# Patient Record
Sex: MALE | Race: BLACK OR AFRICAN AMERICAN | NOT HISPANIC OR LATINO | Employment: OTHER | URBAN - METROPOLITAN AREA
[De-identification: names, ages, dates, MRNs, and addresses within clinical notes are randomized per-mention and may not be internally consistent; named-entity substitution may affect disease eponyms.]

---

## 2017-01-31 RX ORDER — TAMSULOSIN HYDROCHLORIDE 0.4 MG/1
CAPSULE ORAL
Qty: 30 CAPSULE | Refills: 0 | Status: SHIPPED | OUTPATIENT
Start: 2017-01-31 | End: 2018-04-09

## 2018-04-09 ENCOUNTER — HOSPITAL ENCOUNTER (EMERGENCY)
Facility: HOSPITAL | Age: 68
Discharge: HOME | End: 2018-04-09
Payer: MEDICARE

## 2018-04-09 VITALS
HEART RATE: 90 BPM | RESPIRATION RATE: 17 BRPM | DIASTOLIC BLOOD PRESSURE: 82 MMHG | SYSTOLIC BLOOD PRESSURE: 160 MMHG | TEMPERATURE: 98 F | BODY MASS INDEX: 26.54 KG/M2 | OXYGEN SATURATION: 98 % | WEIGHT: 185 LBS

## 2018-04-09 DIAGNOSIS — R73.9 HYPERGLYCEMIA: ICD-10-CM

## 2018-04-09 DIAGNOSIS — N40.0 PROSTATE ENLARGEMENT: ICD-10-CM

## 2018-04-09 DIAGNOSIS — R33.9 URINARY RETENTION: Primary | ICD-10-CM

## 2018-04-09 LAB
ALBUMIN SERPL BCP-MCNC: 4 G/DL
ALP SERPL-CCNC: 89 U/L
ALT SERPL W/O P-5'-P-CCNC: 22 U/L
ANION GAP SERPL CALC-SCNC: 10 MMOL/L
AST SERPL-CCNC: 13 U/L
BACTERIA #/AREA URNS AUTO: ABNORMAL /HPF
BASOPHILS # BLD AUTO: 0.02 K/UL
BASOPHILS NFR BLD: 0.2 %
BILIRUB SERPL-MCNC: 1.1 MG/DL
BILIRUB UR QL STRIP: NEGATIVE
BUN SERPL-MCNC: 13 MG/DL
CALCIUM SERPL-MCNC: 9.7 MG/DL
CHLORIDE SERPL-SCNC: 99 MMOL/L
CLARITY UR REFRACT.AUTO: CLEAR
CO2 SERPL-SCNC: 28 MMOL/L
COLOR UR AUTO: YELLOW
CREAT SERPL-MCNC: 0.92 MG/DL
DIFFERENTIAL METHOD: ABNORMAL
EOSINOPHIL # BLD AUTO: 0.2 K/UL
EOSINOPHIL NFR BLD: 2.4 %
ERYTHROCYTE [DISTWIDTH] IN BLOOD BY AUTOMATED COUNT: 11.7 %
EST. GFR  (AFRICAN AMERICAN): >60 ML/MIN/1.73 M^2
EST. GFR  (NON AFRICAN AMERICAN): >60 ML/MIN/1.73 M^2
GLUCOSE SERPL-MCNC: 235 MG/DL
GLUCOSE SERPL-MCNC: 235 MG/DL (ref 70–110)
GLUCOSE UR QL STRIP: ABNORMAL
HCT VFR BLD AUTO: 42.4 %
HGB BLD-MCNC: 14.8 G/DL
HGB UR QL STRIP: ABNORMAL
HYALINE CASTS UR QL AUTO: 0 /LPF
KETONES UR QL STRIP: NEGATIVE
LEUKOCYTE ESTERASE UR QL STRIP: NEGATIVE
LYMPHOCYTES # BLD AUTO: 1.6 K/UL
LYMPHOCYTES NFR BLD: 18.3 %
MCH RBC QN AUTO: 31.2 PG
MCHC RBC AUTO-ENTMCNC: 34.9 G/DL
MCV RBC AUTO: 90 FL
MICROSCOPIC COMMENT: ABNORMAL
MONOCYTES # BLD AUTO: 0.7 K/UL
MONOCYTES NFR BLD: 8.6 %
NEUTROPHILS # BLD AUTO: 6 K/UL
NEUTROPHILS NFR BLD: 70.3 %
NITRITE UR QL STRIP: NEGATIVE
PH UR STRIP: 7 [PH] (ref 5–8)
PLATELET # BLD AUTO: 235 K/UL
PMV BLD AUTO: 9.8 FL
POCT GLUCOSE: 235 MG/DL (ref 70–110)
POTASSIUM SERPL-SCNC: 4.5 MMOL/L
PROT SERPL-MCNC: 7 G/DL
PROT UR QL STRIP: ABNORMAL
RBC # BLD AUTO: 4.74 M/UL
RBC #/AREA URNS AUTO: 12 /HPF (ref 0–4)
SODIUM SERPL-SCNC: 137 MMOL/L
SP GR UR STRIP: 1.01 (ref 1–1.03)
URN SPEC COLLECT METH UR: ABNORMAL
UROBILINOGEN UR STRIP-ACNC: NEGATIVE EU/DL
WBC # BLD AUTO: 8.59 K/UL
WBC #/AREA URNS AUTO: 3 /HPF (ref 0–5)
YEAST UR QL AUTO: ABNORMAL

## 2018-04-09 PROCEDURE — 99284 EMERGENCY DEPT VISIT MOD MDM: CPT | Mod: 25

## 2018-04-09 PROCEDURE — 51702 INSERT TEMP BLADDER CATH: CPT

## 2018-04-09 PROCEDURE — 82962 GLUCOSE BLOOD TEST: CPT

## 2018-04-09 PROCEDURE — 85025 COMPLETE CBC W/AUTO DIFF WBC: CPT

## 2018-04-09 PROCEDURE — 80053 COMPREHEN METABOLIC PANEL: CPT

## 2018-04-09 PROCEDURE — 81000 URINALYSIS NONAUTO W/SCOPE: CPT

## 2018-04-09 RX ORDER — TAMSULOSIN HYDROCHLORIDE 0.4 MG/1
0.4 CAPSULE ORAL DAILY
Qty: 10 CAPSULE | Refills: 0 | Status: SHIPPED | OUTPATIENT
Start: 2018-04-09 | End: 2018-04-17 | Stop reason: SDUPTHER

## 2018-04-09 NOTE — ED PROVIDER NOTES
Encounter Date: 4/9/2018       History     Chief Complaint   Patient presents with    Dysuria     states he is unable to urinate since this morning. states this happened a 2 years ago     68-year-old male presents to emergency room complaining of inability to urinate morning.  Patient states he had similar issue approximately 2 years ago and was diagnosed with a large prostate.  Has not followed up since that time.  States he has not had any further complications since that time.  Reports abdominal pain and discomfort at this time.          Review of patient's allergies indicates:  No Known Allergies  Past Medical History:   Diagnosis Date    Urinary tract infection      Past Surgical History:   Procedure Laterality Date    ORTHOPEDIC SURGERY       Family History   Problem Relation Age of Onset    Prostate cancer Neg Hx     Kidney disease Neg Hx      Social History   Substance Use Topics    Smoking status: Former Smoker    Smokeless tobacco: Never Used    Alcohol use Yes     Review of Systems   Constitutional: Negative for fever.   HENT: Negative for sore throat.    Respiratory: Negative for shortness of breath.    Cardiovascular: Negative for chest pain.   Gastrointestinal: Negative for nausea.   Genitourinary: Positive for difficulty urinating. Negative for dysuria.   Musculoskeletal: Negative for back pain.   Skin: Negative for rash.   Neurological: Negative for weakness.   Hematological: Does not bruise/bleed easily.   All other systems reviewed and are negative.      Physical Exam     Initial Vitals [04/09/18 1223]   BP Pulse Resp Temp SpO2   (!) 169/95 105 18 97.8 °F (36.6 °C) 98 %      MAP       119.67         Physical Exam    Nursing note and vitals reviewed.  Constitutional: He appears well-developed and well-nourished. He is not diaphoretic. No distress.   HENT:   Head: Normocephalic and atraumatic.   Eyes: Conjunctivae are normal.   Neck: Normal range of motion. Neck supple.   Cardiovascular:  Normal rate and regular rhythm.   Pulmonary/Chest: Breath sounds normal. No respiratory distress.   Abdominal: Soft. He exhibits no distension. There is tenderness in the suprapubic area.   Genitourinary: Prostate is enlarged. Prostate is not tender.   Musculoskeletal: Normal range of motion. He exhibits no tenderness.   Neurological: He is alert and oriented to person, place, and time.   Skin: Skin is warm and dry. Capillary refill takes less than 2 seconds.   Psychiatric: He has a normal mood and affect. His behavior is normal. Judgment and thought content normal.         ED Course   Procedures  Labs Reviewed   URINALYSIS - Abnormal; Notable for the following:        Result Value    Protein, UA 1+ (*)     Glucose, UA 4+ (*)     Occult Blood UA 1+ (*)     All other components within normal limits   URINALYSIS MICROSCOPIC - Abnormal; Notable for the following:     RBC, UA 12 (*)     All other components within normal limits   CBC W/ AUTO DIFFERENTIAL - Abnormal; Notable for the following:     MCH 31.2 (*)     All other components within normal limits   COMPREHENSIVE METABOLIC PANEL - Abnormal; Notable for the following:     Glucose 235 (*)     Total Bilirubin 1.1 (*)     AST 13 (*)     All other components within normal limits   POCT GLUCOSE MONITORING CONTINUOUS - Abnormal; Notable for the following:     POC Glucose 235 (*)     All other components within normal limits   POCT GLUCOSE - Abnormal; Notable for the following:     POCT Glucose 235 (*)     All other components within normal limits             Medical Decision Making:   Initial Assessment:   68-year-old male presents to emergency room complaining of inability to urinate morning.  Patient states he had similar issue approximately 2 years ago and was diagnosed with a large prostate.  Has not followed up since that time.  States he has not had any further complications since that time.  Reports abdominal pain and discomfort at this time.  Bladder scan  performed in over a liter of urine noted in the bladder.  Patient has suprapubic tenderness on exam.  Approximately 1100 cc of urine drained from bladder.  Patient reports relief.  Prostate exam reveals a large prostate nontender.  Exam otherwise unremarkable  Differential Diagnosis:   Enlarged prostate, prostate cancer, ureteritis, UTI  ED Management:  Patient will be discharged with urinary catheter in place.  Instructed to follow-up with urology as soon as possible.  Follow-up primary care doctor in the next 2-3 days.  Educated on catheter care and when to return to the emergency room.  Patient verbalized understanding.    Urine has +4 glucose in urine.  CBG checked in the emergency room and was over 200.  Labs otherwise unremarkable.  Patient instructed to follow primary care doctor related to elevated blood glucose.  If any symptoms worsen return to emergency room.                      Clinical Impression:   The primary encounter diagnosis was Urinary retention. Diagnoses of Prostate enlargement and Hyperglycemia were also pertinent to this visit.                           Keren Hopper NP  04/09/18 [id number]

## 2018-04-09 NOTE — DISCHARGE INSTRUCTIONS
Follow with primary care doctor related to elevated blood glucose.  Follow with Dr. Warner related to urinary retention.  Return to emergency room if any symptoms worsen.

## 2018-04-17 ENCOUNTER — OFFICE VISIT (OUTPATIENT)
Dept: UROLOGY | Facility: CLINIC | Age: 68
End: 2018-04-17
Payer: MEDICARE

## 2018-04-17 VITALS
SYSTOLIC BLOOD PRESSURE: 178 MMHG | DIASTOLIC BLOOD PRESSURE: 88 MMHG | TEMPERATURE: 98 F | BODY MASS INDEX: 25.2 KG/M2 | HEIGHT: 70 IN | WEIGHT: 176 LBS | HEART RATE: 85 BPM

## 2018-04-17 DIAGNOSIS — N40.1 BPH WITH URINARY OBSTRUCTION: ICD-10-CM

## 2018-04-17 DIAGNOSIS — R81 GLUCOSE FOUND IN URINE ON EXAMINATION: ICD-10-CM

## 2018-04-17 DIAGNOSIS — Z46.6 ENCOUNTER FOR FOLEY CATHETER REMOVAL: ICD-10-CM

## 2018-04-17 DIAGNOSIS — R33.9 URINARY RETENTION: Primary | ICD-10-CM

## 2018-04-17 DIAGNOSIS — R73.09 ELEVATED GLUCOSE LEVEL: ICD-10-CM

## 2018-04-17 DIAGNOSIS — N13.8 BPH WITH URINARY OBSTRUCTION: ICD-10-CM

## 2018-04-17 PROCEDURE — 99214 OFFICE O/P EST MOD 30 MIN: CPT | Mod: 25,S$GLB,, | Performed by: NURSE PRACTITIONER

## 2018-04-17 PROCEDURE — 99999 PR PBB SHADOW E&M-EST. PATIENT-LVL III: CPT | Mod: PBBFAC,,, | Performed by: NURSE PRACTITIONER

## 2018-04-17 PROCEDURE — 51700 IRRIGATION OF BLADDER: CPT | Mod: S$GLB,,, | Performed by: NURSE PRACTITIONER

## 2018-04-17 RX ORDER — TAMSULOSIN HYDROCHLORIDE 0.4 MG/1
0.4 CAPSULE ORAL DAILY
Qty: 90 CAPSULE | Refills: 3 | Status: SHIPPED | OUTPATIENT
Start: 2018-04-17 | End: 2018-07-24 | Stop reason: SDUPTHER

## 2018-04-17 NOTE — PATIENT INSTRUCTIONS
BPH (Enlarged Prostate)  The prostate is a gland at the base of the bladder. As some men get older, the prostate may get bigger in size. This problem is called benign prostatic hyperplasia (BPH). BPH puts pressure on the urethra. This is the tube that carries urine from the bladder to the penis. It may interfere with the flow of urine. It may also keep the bladder from emptying fully.    Symptoms of BPH include trouble starting urination and feeling as though the bladder isnt emptying all the way. It also includes a weak urine stream, dribbling and leaking of urine, and frequent and urgent urination (especially at night). BPH can increase the risk of urinary infections. It can also block off urine flow completely. If this occurs, a thin tube (catheter) may be passed into the bladder to help drain urine.  If symptoms are mild, no treatment may be needed right now. If symptoms are more severe, treatment is likely needed. The goal of treatment is to improve urine flow and reduce symptoms. Treatments can include medicine and procedures. Your healthcare provider will discuss treatment options with you as needed.  Home care  The following guidelines will help you care for yourself at home:  · Urinate as soon as you feel the urge. Don't try to hold your urine.  · Don't limit your fluid intake during the day. Drink 6 to 8 glasses of water or liquids a day. This prevents bacteria from building up in the bladder.  · Avoid drinking fluids after dinner to help reduce urination during the night.  · Avoid medicines that can worsen your symptoms. These include certain cold and allergy medicines and antidepressants. Diuretics used for high blood pressure can also worsen symptoms. Talk to your doctor about the medicines you take. Other choices may work better for you.  Prostate cancer screening  BPH does not increase the risk of prostate cancer. But because prostate cancer is a common cancer in men, screening is sometimes  recommended. This may help detect the cancer in its early stages when treatment is most effective. Factors that can increase the risk of prostate cancer include being -American or having a father or brother who had prostate cancer. A high-fat diet may also increase the risk of prostate cancer. Talk to your healthcare provider to see whether you should be screened for prostate cancer.  Follow-up care  Follow up with your healthcare provider, or as advised  To learn more, go to:  · National Kidney & Urologic Diseases Information Clearinghouse  kidney.niddk.nih.gov, 407.934.8586  When to seek medical advice  Call your healthcare provider right away if any of these occur:  · Fever of 100.4°F (38.0°C) or higher, or as advised  · Unable to pass urine for 8 hours  · Increasing pressure or pain in your bladder (lower abdomen)  · Blood in the urine  · Increasing low back pain, not related to injury  · Symptoms of urinary infection (increased urge to urinate, burning when passing urine, foul-smelling urine)  Date Last Reviewed: 7/1/2016  © 7813-1727 [employer]. [address] [place] [address]. All rights reserved. This information is not intended as a substitute for professional medical care. Always follow your healthcare professional's instructions.        Prostate Problems and Related Urinary Symptoms    Many men have problems with the prostate at some time in their lives. The prostate gland is part of the male reproductive system. Its located just below the bladder. The prostate surrounds the urethra (the tube that carries urine and semen out of the body). The main function of the prostate gland is to add fluid to the semen. When problems occur in the prostate, the bladder and urethra are often affected as well. The most common prostate problems are described below.  BPH  BPH (benign prostatic hyperplasia) develops when changing hormone levels cause the prostate to grow larger. This often  starts around age 50. Excess tissue can block the urethra, making it harder for urine to flow. The enlarged prostate can also press on the bladder, so you may need to urinate more often. Other symptoms include straining during urination, a weak urine stream, urinating more at night, incontinence, dribbling at the end of urination, and feeling that the bladder isnt emptying all the way. Note that BPH is not cancer and does not cause cancer.  How BPH affects the bladder  Pushing to urinate through a narrowed urethra can cause the bladder walls to thicken or stretch out of shape. A stretched bladder may have problems emptying all the way. If urine stays in the bladder longer than it should, you may develop an infection or bladder stones. Also, the kidneys cant drain properly into a bladder that doesnt empty completely. This can lead to kidney failure. Pressure from urine buildup can also cause leaking of urine (called overflow incontinence).  Other prostate problems  · Prostatitis is an infection or inflammation that causes the prostate to become painful and swollen. The swelling narrows the urethra and can block the bladder neck. Prostatitis can cause a burning sensation during urination. You may also feel pressure or pain in the genital area. In some cases, prostatitis can cause fever and chills, and can make you very sick.  · Cancer occurs when abnormal cells form a tumor (a lump of cells that grow uncontrolled). Some tumors can be felt during a physical exam, others cant. Prostate cancer often causes no symptoms at all, especially in its early stages. Prostate symptoms are more likely to be caused by a problem that is NOT cancer.   Date Last Reviewed: 1/1/2017 © 2000-2017 [employer]. [address] [place] [address], [place] [address]. All rights reserved. This information is not intended as a substitute for professional medical care. Always follow your healthcare professional's  instructions.        Urinary Retention (Male)  Urinary retention is the medical term for difficulty or inability to pass urine, even though your bladder is full.  Causes  The most common cause of urinary retention in men is the bladder outlet being blocked. This can be due to an enlarged prostate gland or a bladder infection. Certain medicines can also cause this problem. This condition is more likely to occur as men get older.    This condition is treated by insertion of a catheter into the bladder to drain the urine. This provides immediate relief. The catheter may need to remain in place for a few days to prevent a recurrence. The catheter has a balloon on the tip which was inflated after insertion. This prevents the catheter from falling out.  Symptoms  Common symptoms of urinary retention include:  · Pain (not experienced by everyone)  · Frequent urination  · Feeling that the bladder is still full after urinating  · Incontinence (not being able to control the release of urine)  · Swollen abdomen  Treatment  This condition is treated by inserting a tube (catheter) into the bladder to drain the urine. This provides immediate relief. The catheter may need to stay in place for a few days. The catheter has a balloon on the tip, which is inflated after insertion. This prevents the catheter from falling out.  Home care  · If you were given antibiotics, take them until they are used up, or your healthcare provider tells you to stop. It is important to finish the antibiotics even though you feel better. This is to make sure your infection has cleared.  · If a catheter was left in place, it is important to keep bacteria from getting into the collection bag. Do not disconnect the catheter from the collection bag.  · Use a leg band to secure the drainage tube, so it does not pull on the catheter. Drain the collection bag when it becomes full using the drain spout at the bottom of the bag.  · Do not pull on or try to remove  your catheter. This will injure your urethra. The catheter must be removed by a healthcare provider.  Follow-up care  Follow up with your healthcare provider, or as advised.  If a catheter was left in place, it can usually be removed within 3 to 7 days. Some conditions require the catheter to stay in longer. Your healthcare provider will tell you when to return to have the catheter removed.  When to seek medical advice  Call your healthcare provider right away if any of these occur:  · Fever of 100.4ºF (38ºC) or higher, or as directed by your healthcare provider  · Bladder or lower-abdominal pain or fullness  · Abdominal swelling, nausea, vomiting, or back pain  · Blood or urine leakage around the catheter  · Bloody urine coming from the catheter (if a new symptom)  · Weakness, dizziness, or fainting  · Confusion or change in usual level of alertness  · If a catheter was left in place, return if:  ¨ Catheter falls out  ¨ Catheter stops draining for 6 hours  Date Last Reviewed: 7/26/2015  © 7313-4556 [employer]. [address] [place] [address]. All rights reserved. This information is not intended as a substitute for professional medical care. Always follow your healthcare professional's instructions.        Cystoscopy    Cystoscopy is a procedure that lets your doctor look directly inside your urethra and bladder. It can be used to:  · Help diagnose a problem with your urethra, bladder, or kidneys.  · Take a sample (biopsy) of bladder or urethral tissue.  · Treat certain problems (such as removing kidney stones).  · Place a stent to bypass an obstruction.  · Take special X-rays of the kidneys.  Based on the findings, your doctor may recommend other tests or treatments.  What is a cystoscope?  A cystoscope is a telescope-like instrument that contains lenses and fiberoptics (small glass wires that make bright light). The cystoscope may be straight and rigid, or flexible to bend around curves  in the urethra. The doctor may look directly into the cystoscope, or project the image onto a monitor.  Getting ready  · Ask your doctor if you should stop taking any medicines before the procedure.  · Ask whether you should avoid eating or drinking anything after midnight before the procedure.  · Follow any other instructions your doctor gives you.  Tell your doctor before the exam if you:  · Take any medicines, such as aspirin or blood thinners  · Have allergies to any medicines  · Are pregnant   The procedure  Cystoscopy is done in the doctors office, surgery center, or hospital. The doctor and a nurse are present during the procedure. It takes only a few minutes, longer if a biopsy, X-ray, or treatment needs to be done.  During the procedure:  · You lie on an exam table on your back, knees bent and legs apart. You are covered with a drape.  · Your urethra and the area around it are washed. Anesthetic jelly may be applied to numb the urethra. Other pain medicine is usually not needed. In some cases, you may be offered a mild sedative to help you relax. If a more extensive procedure is to be done, such as a biopsy or kidney stone removal, general anesthesia may be needed.  · The cystoscope is inserted. A sterile fluid is put into the bladder to expand it. You may feel pressure from this fluid.  · When the procedure is done, the cystoscope is removed.  After the procedure  If you had a sedative, general anesthesia, or spinal anesthesia, you must have someone drive you home. Once youre home:  · Drink plenty of fluids.  · You may have burning or light bleeding when you urinate--this is normal.  · Medicines may be prescribed to ease any discomfort or prevent infection. Take these as directed.  · Call your doctor if you have heavy bleeding or blood clots, burning that lasts more than a day, a fever over 100°F  (38° C), or trouble urinating.  Date Last Reviewed: 1/1/2017  © 5825-1483 [employer]. [address]  [place] [address]. All rights reserved. This information is not intended as a substitute for professional medical care. Always follow your healthcare professional's instructions.

## 2018-04-17 NOTE — PROGRESS NOTES
Subjective:       Patient ID: Odilon Wilder is a 68 y.o. male.    Chief Complaint: Urinary Retention    Odilon Wilder is a 68 y.o. Male with history of AUR.  He was recently seen in ER on 04/09/2018 for dysuria.  Patient has suprapubic tenderness on exam.    Approximately 1100 cc of urine drained from bladder; UA was clear of apparent infection but was showing glucose (-) ketones.  Aviles catheter was placed.  He was started on Flomax and here today for f/u visit.    He was last seen in clinic 01/11/2016 with Dr. Zavaleta with same issue.    He states normally FOS is good.  Nocturia 0-1x.  Mild issues with ED but nothing significant.          Past Medical History:  No date: Urinary tract infection    Past Surgical History:  No date: ORTHOPEDIC SURGERY    Review of patient's family history indicates:    Prostate cancer                Neg Hx                    Kidney disease                 Neg Hx                      Social History    Marital status:              Spouse name:                       Years of education:                 Number of children:               Occupational History    None on file    Social History Main Topics    Smoking status: Former Smoker                                                                Packs/day: 0.00      Years: 0.00        Smokeless tobacco: Never Used                        Alcohol use: Yes             Drug use: No              Sexual activity: Not Currently        Other Topics            Concern    None on file    Social History Narrative    None on file        Allergies:  Patient has no known allergies.    Medications:  Current Outpatient Prescriptions:   tamsulosin (FLOMAX) 0.4 mg Cp24, Take 1 capsule (0.4 mg total) by mouth once daily., Disp: 90 capsule, Rfl: 3              Review of Systems   Constitutional: Negative for activity change, appetite change, chills and fever.   HENT: Negative for facial swelling and trouble swallowing.    Eyes: Negative for visual  disturbance.   Respiratory: Negative for chest tightness and shortness of breath.    Cardiovascular: Negative for chest pain and palpitations.   Gastrointestinal: Negative.  Negative for abdominal pain, constipation, diarrhea, nausea and vomiting.        Occasional constipation.     Genitourinary: Positive for difficulty urinating. Negative for dysuria, flank pain, hematuria, penile pain, penile swelling, scrotal swelling and testicular pain.        Aviles draining well     Musculoskeletal: Negative for back pain, gait problem, myalgias and neck stiffness.   Skin: Negative for rash.   Neurological: Negative for dizziness and speech difficulty.   Hematological: Does not bruise/bleed easily.   Psychiatric/Behavioral: Negative for behavioral problems.       Objective:      Physical Exam   Nursing note and vitals reviewed.  Constitutional: He is oriented to person, place, and time. Vital signs are normal. He appears well-developed and well-nourished. He is active and cooperative.  Non-toxic appearance. He does not have a sickly appearance.   HENT:   Head: Normocephalic and atraumatic.   Right Ear: External ear normal.   Left Ear: External ear normal.   Nose: Nose normal.   Mouth/Throat: Mucous membranes are normal.   Eyes: Conjunctivae and lids are normal. No scleral icterus.   Neck: Trachea normal, normal range of motion and full passive range of motion without pain. Neck supple. No JVD present. No tracheal deviation present.   Cardiovascular: Normal rate, S1 normal and S2 normal.    Pulmonary/Chest: Effort normal. No respiratory distress. He exhibits no tenderness.   Abdominal: Soft. Normal appearance and bowel sounds are normal. There is no hepatosplenomegaly. There is no tenderness. There is no CVA tenderness.   Genitourinary: Testes normal and penis normal. No penile tenderness.   Musculoskeletal: Normal range of motion.   Neurological: He is alert and oriented to person, place, and time. He has normal strength.    Skin: Skin is warm, dry and intact.     Psychiatric: He has a normal mood and affect. His behavior is normal. Judgment and thought content normal.       Assessment:       1. Urinary retention    2. BPH with urinary obstruction    3. Encounter for Aviles catheter removal    4. Elevated glucose level    5. Glucose found in urine on examination        Plan:         I spent 25 minutes with the patient of which more than half was spent in direct consultation with the patient in regards to our treatment and plan.    Education and recommendations of today's plan of care including home remedies.  Voiding trial passed in the office today: nurse placed 180cc in and ~170cc received badk.  He voiced no complaints.  We discussed the contributory factors for his AUR.  He states not dx as diabetic but has family members with diabetes.  We reviewed his labs showing elevated glucose. Uncontrolled diabetes effects on bladder as well as ED.  Discussed diet modifications; stool softeners to prevent constipation.  Continue Flomax daily.  Will have him to RTC one month for full exam, UA/PVR/PSA/Hgb A1c

## 2018-05-14 ENCOUNTER — LAB VISIT (OUTPATIENT)
Dept: LAB | Facility: HOSPITAL | Age: 68
End: 2018-05-14
Attending: NURSE PRACTITIONER
Payer: MEDICARE

## 2018-05-14 DIAGNOSIS — R33.9 URINARY RETENTION: ICD-10-CM

## 2018-05-14 DIAGNOSIS — N13.8 BPH WITH URINARY OBSTRUCTION: ICD-10-CM

## 2018-05-14 DIAGNOSIS — R73.09 ELEVATED GLUCOSE LEVEL: ICD-10-CM

## 2018-05-14 DIAGNOSIS — R81 GLUCOSE FOUND IN URINE ON EXAMINATION: ICD-10-CM

## 2018-05-14 DIAGNOSIS — Z46.6 ENCOUNTER FOR FOLEY CATHETER REMOVAL: ICD-10-CM

## 2018-05-14 DIAGNOSIS — N40.1 BPH WITH URINARY OBSTRUCTION: ICD-10-CM

## 2018-05-14 LAB
COMPLEXED PSA SERPL-MCNC: 12.5 NG/ML
ESTIMATED AVG GLUCOSE: 194 MG/DL
HBA1C MFR BLD HPLC: 8.4 %

## 2018-05-14 PROCEDURE — 84153 ASSAY OF PSA TOTAL: CPT

## 2018-05-14 PROCEDURE — 36415 COLL VENOUS BLD VENIPUNCTURE: CPT | Mod: PO

## 2018-05-14 PROCEDURE — 83036 HEMOGLOBIN GLYCOSYLATED A1C: CPT

## 2018-05-17 ENCOUNTER — OFFICE VISIT (OUTPATIENT)
Dept: UROLOGY | Facility: CLINIC | Age: 68
End: 2018-05-17
Payer: MEDICARE

## 2018-05-17 VITALS
DIASTOLIC BLOOD PRESSURE: 77 MMHG | HEART RATE: 92 BPM | BODY MASS INDEX: 25.91 KG/M2 | WEIGHT: 181 LBS | HEIGHT: 70 IN | SYSTOLIC BLOOD PRESSURE: 141 MMHG

## 2018-05-17 DIAGNOSIS — N13.8 BPH WITH URINARY OBSTRUCTION: Primary | ICD-10-CM

## 2018-05-17 DIAGNOSIS — N40.1 BPH WITH URINARY OBSTRUCTION: Primary | ICD-10-CM

## 2018-05-17 DIAGNOSIS — R33.9 INCOMPLETE EMPTYING OF BLADDER: ICD-10-CM

## 2018-05-17 DIAGNOSIS — R97.20 ELEVATED PSA: ICD-10-CM

## 2018-05-17 LAB
BILIRUB SERPL-MCNC: ABNORMAL MG/DL
BLOOD URINE, POC: 250
COLOR, POC UA: YELLOW
GLUCOSE UR QL STRIP: 500
KETONES UR QL STRIP: ABNORMAL
LEUKOCYTE ESTERASE URINE, POC: ABNORMAL
NITRITE, POC UA: ABNORMAL
PH, POC UA: 5
PROTEIN, POC: ABNORMAL
SPECIFIC GRAVITY, POC UA: 1.01
UROBILINOGEN, POC UA: ABNORMAL

## 2018-05-17 PROCEDURE — 99999 PR PBB SHADOW E&M-EST. PATIENT-LVL III: CPT | Mod: PBBFAC,,, | Performed by: NURSE PRACTITIONER

## 2018-05-17 PROCEDURE — 99214 OFFICE O/P EST MOD 30 MIN: CPT | Mod: 25,S$GLB,, | Performed by: NURSE PRACTITIONER

## 2018-05-17 PROCEDURE — 81002 URINALYSIS NONAUTO W/O SCOPE: CPT | Mod: S$GLB,,, | Performed by: NURSE PRACTITIONER

## 2018-05-17 RX ORDER — LIDOCAINE HYDROCHLORIDE 20 MG/ML
JELLY TOPICAL ONCE
Status: CANCELLED | OUTPATIENT
Start: 2018-05-17 | End: 2018-05-17

## 2018-05-17 RX ORDER — LIDOCAINE HYDROCHLORIDE 10 MG/ML
10 INJECTION INFILTRATION; PERINEURAL ONCE
Status: CANCELLED | OUTPATIENT
Start: 2018-05-17 | End: 2018-05-17

## 2018-05-17 RX ORDER — CIPROFLOXACIN 250 MG/1
500 TABLET, FILM COATED ORAL ONCE
Status: CANCELLED | OUTPATIENT
Start: 2018-05-17 | End: 2018-05-17

## 2018-05-17 NOTE — PATIENT INSTRUCTIONS
BPH (Enlarged Prostate)  The prostate is a gland at the base of the bladder. As some men get older, the prostate may get bigger in size. This problem is called benign prostatic hyperplasia (BPH). BPH puts pressure on the urethra. This is the tube that carries urine from the bladder to the penis. It may interfere with the flow of urine. It may also keep the bladder from emptying fully.    Symptoms of BPH include trouble starting urination and feeling as though the bladder isnt emptying all the way. It also includes a weak urine stream, dribbling and leaking of urine, and frequent and urgent urination (especially at night). BPH can increase the risk of urinary infections. It can also block off urine flow completely. If this occurs, a thin tube (catheter) may be passed into the bladder to help drain urine.  If symptoms are mild, no treatment may be needed right now. If symptoms are more severe, treatment is likely needed. The goal of treatment is to improve urine flow and reduce symptoms. Treatments can include medicine and procedures. Your healthcare provider will discuss treatment options with you as needed.  Home care  The following guidelines will help you care for yourself at home:  · Urinate as soon as you feel the urge. Don't try to hold your urine.  · Don't limit your fluid intake during the day. Drink 6 to 8 glasses of water or liquids a day. This prevents bacteria from building up in the bladder.  · Avoid drinking fluids after dinner to help reduce urination during the night.  · Avoid medicines that can worsen your symptoms. These include certain cold and allergy medicines and antidepressants. Diuretics used for high blood pressure can also worsen symptoms. Talk to your doctor about the medicines you take. Other choices may work better for you.  Prostate cancer screening  BPH does not increase the risk of prostate cancer. But because prostate cancer is a common cancer in men, screening is sometimes  recommended. This may help detect the cancer in its early stages when treatment is most effective. Factors that can increase the risk of prostate cancer include being -American or having a father or brother who had prostate cancer. A high-fat diet may also increase the risk of prostate cancer. Talk to your healthcare provider to see whether you should be screened for prostate cancer.  Follow-up care  Follow up with your healthcare provider, or as advised  To learn more, go to:  · National Kidney & Urologic Diseases Information Clearinghouse  kidney.niddk.nih.gov, 520.773.2922  When to seek medical advice  Call your healthcare provider right away if any of these occur:  · Fever of 100.4°F (38.0°C) or higher, or as advised  · Unable to pass urine for 8 hours  · Increasing pressure or pain in your bladder (lower abdomen)  · Blood in the urine  · Increasing low back pain, not related to injury  · Symptoms of urinary infection (increased urge to urinate, burning when passing urine, foul-smelling urine)  Date Last Reviewed: 7/1/2016  © 1729-0041 [employer]. [address] [place] [address]. All rights reserved. This information is not intended as a substitute for professional medical care. Always follow your healthcare professional's instructions.        Prostate Problems and Related Urinary Symptoms    Many men have problems with the prostate at some time in their lives. The prostate gland is part of the male reproductive system. Its located just below the bladder. The prostate surrounds the urethra (the tube that carries urine and semen out of the body). The main function of the prostate gland is to add fluid to the semen. When problems occur in the prostate, the bladder and urethra are often affected as well. The most common prostate problems are described below.  BPH  BPH (benign prostatic hyperplasia) develops when changing hormone levels cause the prostate to grow larger. This often  starts around age 50. Excess tissue can block the urethra, making it harder for urine to flow. The enlarged prostate can also press on the bladder, so you may need to urinate more often. Other symptoms include straining during urination, a weak urine stream, urinating more at night, incontinence, dribbling at the end of urination, and feeling that the bladder isnt emptying all the way. Note that BPH is not cancer and does not cause cancer.  How BPH affects the bladder  Pushing to urinate through a narrowed urethra can cause the bladder walls to thicken or stretch out of shape. A stretched bladder may have problems emptying all the way. If urine stays in the bladder longer than it should, you may develop an infection or bladder stones. Also, the kidneys cant drain properly into a bladder that doesnt empty completely. This can lead to kidney failure. Pressure from urine buildup can also cause leaking of urine (called overflow incontinence).  Other prostate problems  · Prostatitis is an infection or inflammation that causes the prostate to become painful and swollen. The swelling narrows the urethra and can block the bladder neck. Prostatitis can cause a burning sensation during urination. You may also feel pressure or pain in the genital area. In some cases, prostatitis can cause fever and chills, and can make you very sick.  · Cancer occurs when abnormal cells form a tumor (a lump of cells that grow uncontrolled). Some tumors can be felt during a physical exam, others cant. Prostate cancer often causes no symptoms at all, especially in its early stages. Prostate symptoms are more likely to be caused by a problem that is NOT cancer.   Date Last Reviewed: 1/1/2017 © 2000-2017 [employer]. [address] [place] [address], [place] [address]. All rights reserved. This information is not intended as a substitute for professional medical care. Always follow your healthcare professional's  instructions.        Transrectal Ultrasound and Biopsy    A transrectal ultrasound is an imaging test. It uses sound waves to create pictures of a mans prostate gland. Your prostate gland is in front of your rectum. For this test, a special probe (transducer) is placed directly into your rectum. During the test, tissue samples (a biopsy) may also be taken. The test is done by a specially trained technologist called a sonographer.  Getting ready for your test  · You may be asked to clear your bowel before the test. This may be done by injecting liquid into your rectum (an enema). Or it can be done by drinking a special liquid.  · You may be asked not to eat or drink anything after midnight the night before the test.  · Tell your health care provider about any medicines, herbs, or supplements you are taking. This includes any over-the-counter medicines such as aspirin or ibuprofen.  · Answer any questions your provider has about your medical history. This will help your provider tailor the test to your health needs.  During your test  · You may be asked to change into a gown. You will then lie on your side on an exam table, with your knees bent.  · The test is done with a hand-held probe. This is a short, slender chelle. It has a sterile, disposable cover. It is also greased (lubricated) with some gel. It is then gently placed inside your rectum.  · You will feel pressure from the probe. If you feel pain, let your provider know.  · If a biopsy is taken, it is done using a small probe with a very tiny needle on the end. This needle enters your prostate and removes several tiny samples of tissue. These samples are then sent to a lab to be examined. Any mild pain from the biopsy is usually minor.   After your test  Before leaving, you may need to wait for a short time while the images are reviewed. In most cases, you can go back to your normal routine after the test. If you had a biopsy, you may see some blood in your  urine, sperm, or stool for a day or so. This is normal. Your health care provider will let you know when your test results are ready.  In some cases, a diagnosis cant be made from the tissue sample that was taken. If this happens, your provider will talk with you about whether you may need another biopsy. Or you may need a different procedure.  When to call your health care provider  Call your provider if you have:  · Very bloody urine or stool  · A fever lasting 24 to 48 hours  · Any other symptoms that your provider asks you to report, based on your medical condition   Date Last Reviewed: 6/19/2015  © 8742-7989 [employer]. [address] [place] [address], [place] [address]. All rights reserved. This information is not intended as a substitute for professional medical care. Always follow your healthcare professional's instructions.        Prostate Needle Biopsy    Prostate needle biopsy is a test to look for prostate cancer. During the test, a thin, hollow needle is used to take small samples of tissue from the prostate. The samples are then tested in a lab.  Getting ready for the procedure  Prepare as you have been told. In addition to the following:  · Tell your healthcare provider about all medicines you take. This includes herbs and other supplements. It also includes any blood thinners, such as warfarin, clopidogrel, or daily aspirin. You may need to stop taking some or all of them before the procedure.  · You may be told to use a laxative, enemas, or both before the biopsy. This is to empty the colon and rectum of stool. Follow the instructions you are given.  · Your healthcare provider may prescribe antibiotics before the procedure. If so, take these as directed.  Risks and possible complications   All procedures have risks. The risks of this procedure include:  · Discomfort in the prostate area  · Infection in the urinary tract or prostate  · Infection in the bloodstream  · Rectal or urinary bleeding    The day of the procedure  The procedure is done in a healthcare providers office or a hospital. It takes about 45 minutes. You will be able to go home the same day. Transrectal ultrasound is often used during the procedure. This test uses sound waves to make images on a computer screen. The images help the healthcare provider insert the needle in the correct place. During the biopsy:  · If ultrasound will be used, you may be asked to drink water to fill your bladder.    · You may lie on your side on an exam table.   · The ultrasound transducer, which is about the size of a finger, is lubricated. It is then inserted into the rectum. This will feel like a prostate exam. The transducer is moved until the prostate can be seen in the ultrasound images.    · To numb the biopsy area, local anesthetics may be injected. You might also be given medicine to make you sleepy.  · Using the ultrasound images as a guide, the biopsy needle is inserted. It may be inserted through the rectum or through the skin between the scrotum and the anus (perineum).  · The needle is used to take tissue samples from the prostate. About 12 samples are taken from different areas of the prostate. These samples are sent to a lab to be tested for cancer.  After the biopsy  At first you may feel a little lightheaded, especially if you had medicine to make you sleepy. You can lie on the table until you feel able to stand. You can go home once you are feeling better. You can go back to your normal activities. You may have some blood in your urine or stool that day. This is normal. You may also notice blood in your semen for weeks after the biopsy. This is normal and not dangerous. Your healthcare provider can tell you more about what to expect.  Follow-up care  You will see your healthcare provider for a follow-up visit. Depending on the biopsy results, you may be scheduled for more tests. If signs of cancer are found, you and your healthcare provider  can discuss options for further testing.  When to call your healthcare provider  Call your healthcare provider if you have any of the following:  · Blood clots in your urine  · Bloody diarrhea  · Blood in the urine or stool that doesnt go away after 48 hours  · Chest pain or trouble breathing (call 911)  · Chills  · Feeling of weakness  · Fever of 100.4°F (38°C) or higher, or as directed by your healthcare provider  · Inability to urinate   Date Last Reviewed: 5/1/2017  © 7664-5460 [employer]. [address] [place] [address], [place] [address]. All rights reserved. This information is not intended as a substitute for professional medical care. Always follow your healthcare professional's instructions.

## 2018-05-17 NOTE — PROGRESS NOTES
Subjective:       Patient ID: Odilon Wilder is a 68 y.o. male.    Chief Complaint: Benign Prostatic Hypertrophy    Odilon Wilder is a 68 y.o. Male with history of AUR.  He was seen in ER on 04/09/2018 for dysuria.  Patient has suprapubic tenderness on exam.    Approximately 1100 cc of urine drained from bladder; UA was clear of apparent infection but was showing glucose (-) ketones.  Aviles catheter was placed.  01/11/2016 with Dr. Zavaleta with same issue.  He was started on Flomax in ER.    He states normally FOS is good.  Nocturia 0-1x.  Mild issues with ED but nothing significant.    He was last seen in clinic 04/17/2018 for ER F/u  At that visit he passed the VT     Here today for one month check                   Past Medical History:  No date: Urinary tract infection    Past Surgical History:  No date: ORTHOPEDIC SURGERY    Review of patient's family history indicates:    Prostate cancer                Neg Hx                    Kidney disease                 Neg Hx                      Social History    Marital status:              Spouse name:                       Years of education:                 Number of children:               Occupational History    None on file    Social History Main Topics    Smoking status: Former Smoker                                                                Packs/day: 0.00      Years: 0.00        Smokeless tobacco: Never Used                        Alcohol use: Yes             Drug use: No              Sexual activity: Not Currently        Other Topics            Concern    None on file    Social History Narrative    None on file        Allergies:  Patient has no known allergies.    Medications:  Current Outpatient Prescriptions:   tamsulosin (FLOMAX) 0.4 mg Cp24, Take 1 capsule (0.4 mg total) by mouth once daily., Disp: 90 capsule, Rfl: 3              Review of Systems   Constitutional: Negative for activity change, appetite change, chills and fever.   HENT:  Negative for facial swelling and trouble swallowing.    Eyes: Negative for visual disturbance.   Respiratory: Negative for chest tightness and shortness of breath.    Cardiovascular: Negative for chest pain and palpitations.   Gastrointestinal: Negative.  Negative for abdominal pain, constipation, diarrhea, nausea and vomiting.   Genitourinary: Negative for difficulty urinating, dysuria, flank pain, hematuria, penile pain, penile swelling, scrotal swelling and testicular pain.        FOS is good;  Nocturia 0-1x     Musculoskeletal: Negative for back pain, gait problem, myalgias and neck stiffness.   Skin: Negative for rash.   Neurological: Negative for dizziness and speech difficulty.   Hematological: Does not bruise/bleed easily.   Psychiatric/Behavioral: Negative for behavioral problems.       Objective:      Physical Exam   Nursing note and vitals reviewed.  Constitutional: He is oriented to person, place, and time. Vital signs are normal. He appears well-developed and well-nourished. He is active and cooperative.  Non-toxic appearance. He does not have a sickly appearance.   Urine dipped clear of infection but (+) glucose and micro blood.     HENT:   Head: Normocephalic and atraumatic.   Right Ear: External ear normal.   Left Ear: External ear normal.   Nose: Nose normal.   Mouth/Throat: Mucous membranes are normal.   Eyes: Conjunctivae and lids are normal. No scleral icterus.   Neck: Trachea normal, normal range of motion and full passive range of motion without pain. Neck supple. No JVD present. No tracheal deviation present.   Cardiovascular: Normal rate, S1 normal and S2 normal.    Pulmonary/Chest: Effort normal. No respiratory distress. He exhibits no tenderness.   Abdominal: Soft. Normal appearance and bowel sounds are normal. There is no hepatosplenomegaly. There is no tenderness. There is no CVA tenderness.   Genitourinary: Rectum normal, testes normal and penis normal. Rectal exam shows no external  hemorrhoid, no mass and no tenderness. Prostate is enlarged. Right testis shows no mass, no swelling and no tenderness. Left testis shows no mass, no swelling and no tenderness. Uncircumcised. No phimosis, paraphimosis, penile erythema or penile tenderness. No discharge found.       Musculoskeletal: Normal range of motion.   Lymphadenopathy: No inguinal adenopathy noted on the left side.   Neurological: He is alert and oriented to person, place, and time. He has normal strength.   Skin: Skin is warm, dry and intact.     Psychiatric: He has a normal mood and affect. His behavior is normal. Judgment and thought content normal.       Assessment:       1. BPH with urinary obstruction    2. Incomplete emptying of bladder    3. Elevated PSA        Plan:         I spent 25 minutes with the patient of which more than half was spent in direct consultation with the patient in regards to our treatment and plan.    Education and recommendations of today's plan of care including home remedies.  We discussed the office findings  Discussed the importance of getting and f/u with PCP to manage his diabetes  Risks with diabetes on urological health as well as overall health.  He not emptying all the way; chronic issues; contributory factors reviewed: BPH, DM  Diet modifications.  Continue the flomax daily  Repeat PSA; if elevated then set up cysto/trus bx with Dr. Warner

## 2018-05-23 ENCOUNTER — TELEPHONE (OUTPATIENT)
Dept: FAMILY MEDICINE | Facility: CLINIC | Age: 68
End: 2018-05-23

## 2018-05-23 ENCOUNTER — OFFICE VISIT (OUTPATIENT)
Dept: FAMILY MEDICINE | Facility: CLINIC | Age: 68
End: 2018-05-23
Payer: MEDICARE

## 2018-05-23 VITALS
HEIGHT: 70 IN | DIASTOLIC BLOOD PRESSURE: 90 MMHG | WEIGHT: 180.31 LBS | HEART RATE: 107 BPM | OXYGEN SATURATION: 97 % | TEMPERATURE: 98 F | SYSTOLIC BLOOD PRESSURE: 150 MMHG | BODY MASS INDEX: 25.81 KG/M2

## 2018-05-23 DIAGNOSIS — E11.9 TYPE 2 DIABETES MELLITUS WITHOUT COMPLICATION, WITHOUT LONG-TERM CURRENT USE OF INSULIN: ICD-10-CM

## 2018-05-23 DIAGNOSIS — N40.0 BENIGN PROSTATIC HYPERPLASIA, UNSPECIFIED WHETHER LOWER URINARY TRACT SYMPTOMS PRESENT: ICD-10-CM

## 2018-05-23 DIAGNOSIS — I10 ESSENTIAL HYPERTENSION: ICD-10-CM

## 2018-05-23 DIAGNOSIS — Z12.11 ENCOUNTER FOR SCREENING FOR MALIGNANT NEOPLASM OF COLON: ICD-10-CM

## 2018-05-23 DIAGNOSIS — Z23 NEED FOR PNEUMOCOCCAL VACCINATION: Primary | ICD-10-CM

## 2018-05-23 LAB
CREAT UR-MCNC: 175 MG/DL
MICROALBUMIN UR DL<=1MG/L-MCNC: 145 UG/ML
MICROALBUMIN/CREATININE RATIO: 82.9 UG/MG

## 2018-05-23 PROCEDURE — 90670 PCV13 VACCINE IM: CPT | Mod: S$GLB,,, | Performed by: FAMILY MEDICINE

## 2018-05-23 PROCEDURE — 3080F DIAST BP >= 90 MM HG: CPT | Mod: CPTII,S$GLB,, | Performed by: FAMILY MEDICINE

## 2018-05-23 PROCEDURE — 82043 UR ALBUMIN QUANTITATIVE: CPT

## 2018-05-23 PROCEDURE — 3045F PR MOST RECENT HEMOGLOBIN A1C LEVEL 7.0-9.0%: CPT | Mod: CPTII,S$GLB,, | Performed by: FAMILY MEDICINE

## 2018-05-23 PROCEDURE — 3077F SYST BP >= 140 MM HG: CPT | Mod: CPTII,S$GLB,, | Performed by: FAMILY MEDICINE

## 2018-05-23 PROCEDURE — 99214 OFFICE O/P EST MOD 30 MIN: CPT | Mod: 25,S$GLB,, | Performed by: FAMILY MEDICINE

## 2018-05-23 PROCEDURE — G0009 ADMIN PNEUMOCOCCAL VACCINE: HCPCS | Mod: S$GLB,,, | Performed by: FAMILY MEDICINE

## 2018-05-23 RX ORDER — METFORMIN HYDROCHLORIDE 500 MG/1
500 TABLET ORAL 2 TIMES DAILY WITH MEALS
Qty: 180 TABLET | Refills: 3 | Status: SHIPPED | OUTPATIENT
Start: 2018-05-23 | End: 2019-08-09 | Stop reason: SDUPTHER

## 2018-05-23 RX ORDER — ATORVASTATIN CALCIUM 10 MG/1
10 TABLET, FILM COATED ORAL EVERY OTHER DAY
Qty: 45 TABLET | Refills: 3 | Status: SHIPPED | OUTPATIENT
Start: 2018-05-23 | End: 2019-05-21 | Stop reason: SDUPTHER

## 2018-05-23 RX ORDER — GLIMEPIRIDE 1 MG/1
1 TABLET ORAL
Qty: 90 TABLET | Refills: 3 | Status: SHIPPED | OUTPATIENT
Start: 2018-05-23 | End: 2019-05-29 | Stop reason: SDUPTHER

## 2018-05-23 RX ORDER — LISINOPRIL 2.5 MG/1
2.5 TABLET ORAL DAILY
Qty: 90 TABLET | Refills: 3 | Status: SHIPPED | OUTPATIENT
Start: 2018-05-23 | End: 2019-05-21 | Stop reason: SDUPTHER

## 2018-05-23 NOTE — PROGRESS NOTES
Patient ID: Odilon Wilder is a 68 y.o. male.    Chief Complaint: Follow-up (sugar in urine  foot exam , low dose statin)    HPI      Odilon Wilder is a 68 y.o. male. here for initial vist bc of high glucose.  Urinary obstruction recetly put catherterin then removed.  Meds only    No current complaints.        Review of Symptoms    Constitutional: Negative.    HENT: Negative.    Eyes: Negative.    Respiratory: Negative.    Cardiovascular: Negative.    Gastrointestinal: Negative.    Endocrine: Negative.    Genitourinary: Negative.    Musculoskeletal: Negative.    Skin: Negative.    Allergic/Immunologic: Negative.    Neurological: Negative.    Hematological: Negative.    Psychiatric/Behavioral: Negative.      Except as above in HPI        Physical  Exam    Constitutional:  Oriented to person, place, and time. Appears well-developed and well-nourished.     HENT:   Head: Normocephalic and atraumatic.     Right Ear: Tympanic membrane, external ear and ear canal normal.     Left Ear: Tympanic membrane, external ear and ear canal normal.     Nose: Nose normal. No rhinorrhea or nasal deformity.     Mouth/Throat: Uvula is midline, oropharynx is clear and moist and mucous membranes are normal.      Eyes: Conjunctivae are normal. Right eye exhibits no discharge. Left eye exhibits no discharge. No scleral icterus.     Neck:  No JVD present. No tracheal deviation  [x]  Neck supple.   []  No Carotid bruit    Cardiovascular: Normal rate, regular rhythm and normal heart sounds.      Pulmonary/Chest: Effort normal and breath sounds normal. No stridor. No respiratory distress. No wheezes. No rales.      Musculoskeletal: Normal range of motion. No edema or tenderness.   No deformity     Lymphadenopathy:  No cervical adenopathy.     Neurological:  Alert and oriented to person, place, and time. Coordination normal.     Skin: Skin is warm and dry. No rash noted.     Psychiatric: Normal mood and affect. Speech is normal and behavior  is normal. Judgment and thought content normal.     Complete Blood Count  Lab Results   Component Value Date    RBC 4.74 04/09/2018    HGB 14.8 04/09/2018    HCT 42.4 04/09/2018    MCV 90 04/09/2018    MCH 31.2 (H) 04/09/2018    MCHC 34.9 04/09/2018    RDW 11.7 04/09/2018     04/09/2018    MPV 9.8 04/09/2018    GRAN 6.0 04/09/2018    GRAN 70.3 04/09/2018    LYMPH 1.6 04/09/2018    LYMPH 18.3 04/09/2018    MONO 0.7 04/09/2018    MONO 8.6 04/09/2018    EOS 0.2 04/09/2018    BASO 0.02 04/09/2018    EOSINOPHIL 2.4 04/09/2018    BASOPHIL 0.2 04/09/2018    DIFFMETHOD Automated 04/09/2018       Comprehensive Metabolic Panel  Lab Results   Component Value Date     (H) 04/09/2018    BUN 13 04/09/2018    CREATININE 0.92 04/09/2018     04/09/2018    K 4.5 04/09/2018    CL 99 04/09/2018    PROT 7.0 04/09/2018    ALBUMIN 4.0 04/09/2018    BILITOT 1.1 (H) 04/09/2018    AST 13 (L) 04/09/2018    ALKPHOS 89 04/09/2018    CO2 28 04/09/2018    ALT 22 04/09/2018    ANIONGAP 10 04/09/2018    EGFRNONAA >60.0 04/09/2018    ESTGFRAFRICA >60.0 04/09/2018       TSH  No results found for: TSH    Assessment / Plan:      ICD-10-CM ICD-9-CM   1. Need for pneumococcal vaccination Z23 V03.82   2. Type 2 diabetes mellitus without complication, without long-term current use of insulin E11.9 250.00   3. Encounter for screening for malignant neoplasm of colon Z12.11 V76.51   4. Essential hypertension I10 401.9   5. Benign prostatic hyperplasia, unspecified whether lower urinary tract symptoms present N40.0 600.00     Need for pneumococcal vaccination    Type 2 diabetes mellitus without complication, without long-term current use of insulin  -     MICROALBUMIN / CREATININE RATIO URINE  -     Ambulatory referral to Ophthalmology    Encounter for screening for malignant neoplasm of colon  -     Case request GI: COLONOSCOPY    Essential hypertension    Benign prostatic hyperplasia, unspecified whether lower urinary tract symptoms  present    Other orders  -     Cancel: US Abdominal Aorta; Future; Expected date: 05/23/2018  -     Cancel: Lipid panel; Future; Expected date: 05/23/2018  -     Cancel: Hepatitis C antibody; Future; Expected date: 05/23/2018  -     metFORMIN (GLUCOPHAGE) 500 MG tablet; Take 1 tablet (500 mg total) by mouth 2 (two) times daily with meals.  Dispense: 180 tablet; Refill: 3  -     glimepiride (AMARYL) 1 MG tablet; Take 1 tablet (1 mg total) by mouth before breakfast.  Dispense: 90 tablet; Refill: 3  -     atorvastatin (LIPITOR) 10 MG tablet; Take 1 tablet (10 mg total) by mouth every other day.  Dispense: 45 tablet; Refill: 3  -     Pneumococcal Conjugate Vaccine (13 Valent) (IM)      Long discussion regarding diabetes  Hypertension-for chronic follow-up  Discuss-need for PHYSICAL SMOKING HISTORY  EVH-continue Flomax  Discussion of kidney disease possibilities and strokes with out-of-control diabetes  Discussed screening tests needed for person his age    Discussed how to stay healthy including: diet, exercise, refraining from smoking and discussed screening exams / tests needed for age, sex and family Hx.

## 2018-05-23 NOTE — LETTER
May 28, 2018    Odilon Jerome Tina  [address]  [place] [address]             [facility]  [address] [place] [address]  Phone: [phone]  Fax: [phone] Dear Mr. Wilder:    We have been trying to contact you regarding your lab results.     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 It is suggested that anyone with diabetes that shows incomplete filtering of the kidneys start on a low dose blood pressure medication. Lisinopril has been sent to your pharmacy. This medication will not effect your blood pressure very much, but will protect your kidneys from further damage.     If you have any questions or concerns, please don't hesitate to call.    Sincerely,    Dr.Andrew Stewart

## 2018-05-23 NOTE — LETTER
May 30, 2018      Leighann Ortiz, NP  [address]  [facility] [address]           [place] [address] [address]  Phone: [phone]  Fax: [phone]          Patient: Odilon Wilder   MR Number: [id number]   YOB: 1950   Date of Visit: 5/23/2018       Dear Leighann Ortiz:    Thank you for referring Odilon Wilder to me for evaluation. Attached you will find relevant portions of my assessment and plan of care.    If you have questions, please do not hesitate to call me. I look forward to following Odilon Wilder along with you.    Sincerely,    Alfredito Pleitez  CC:  No Recipients    If you would like to receive this communication electronically, please contact [email or web address] or [phone] to request more information on [facility] access.    For providers and/or their staff who would like to refer a patient to [facility], please contact us through our one-stop-shop provider referral line, [facility] , at [phone].    If you feel you have received this communication in error or would no longer like to receive these types of communications, please e-mail [email or web address]

## 2018-05-24 NOTE — TELEPHONE ENCOUNTER
Called pt back to inform him per Dr Porter that his kidneys are not filtering out the protein like they should and that with him having diabetes he should be on a low dose BP medication to help protect his kidney from damage. He does not feel this will affect his BP too much. No answer left message on VM to call office back

## 2018-05-24 NOTE — TELEPHONE ENCOUNTER
Your urine shows that you Kidneys are not filtering all the protein Correctly. It is suggested that anyone with diabetes that has this Incomplete filtering start on a low dose blood pressure medicine. I will call this medicine in for you it is called lisinopril.- I do not expect it to make much of a difference in your blood pressure however it can help protect your Kidneys from further damage.

## 2018-05-25 NOTE — TELEPHONE ENCOUNTER
Called pt back to discuss results and added medication no answer left message on VM to call office back

## 2018-05-29 DIAGNOSIS — E11.9 TYPE 2 DIABETES MELLITUS WITHOUT COMPLICATION: ICD-10-CM

## 2018-06-19 ENCOUNTER — LAB VISIT (OUTPATIENT)
Dept: LAB | Facility: HOSPITAL | Age: 68
End: 2018-06-19
Attending: NURSE PRACTITIONER
Payer: MEDICARE

## 2018-06-19 DIAGNOSIS — Z11.59 NEED FOR HEPATITIS C SCREENING TEST: Primary | ICD-10-CM

## 2018-06-19 DIAGNOSIS — R97.20 ELEVATED PSA: ICD-10-CM

## 2018-06-19 DIAGNOSIS — Z11.59 NEED FOR HEPATITIS C SCREENING TEST: ICD-10-CM

## 2018-06-19 DIAGNOSIS — E11.9 TYPE 2 DIABETES MELLITUS WITHOUT COMPLICATION: ICD-10-CM

## 2018-06-19 LAB — COMPLEXED PSA SERPL-MCNC: 10.3 NG/ML

## 2018-06-19 PROCEDURE — 84153 ASSAY OF PSA TOTAL: CPT

## 2018-06-19 PROCEDURE — 36415 COLL VENOUS BLD VENIPUNCTURE: CPT | Mod: PO

## 2018-06-20 ENCOUNTER — TELEPHONE (OUTPATIENT)
Dept: UROLOGY | Facility: CLINIC | Age: 68
End: 2018-06-20

## 2018-06-20 DIAGNOSIS — R97.20 ELEVATED PSA: Primary | ICD-10-CM

## 2018-06-20 RX ORDER — CIPROFLOXACIN 500 MG/1
TABLET ORAL
Qty: 4 TABLET | Refills: 0 | Status: SHIPPED | OUTPATIENT
Start: 2018-06-20 | End: 2018-06-25

## 2018-06-20 NOTE — TELEPHONE ENCOUNTER
Patient notified that His PSA has remained elevated.   He need TRUS bx with Dr. Warner. Scheduled TRUS instructions given, notice mailed.

## 2018-06-22 DIAGNOSIS — E11.9 TYPE 2 DIABETES MELLITUS WITHOUT COMPLICATION: ICD-10-CM

## 2018-06-25 ENCOUNTER — OFFICE VISIT (OUTPATIENT)
Dept: FAMILY MEDICINE | Facility: CLINIC | Age: 68
End: 2018-06-25
Payer: MEDICARE

## 2018-06-25 VITALS
WEIGHT: 185.81 LBS | DIASTOLIC BLOOD PRESSURE: 70 MMHG | TEMPERATURE: 98 F | OXYGEN SATURATION: 96 % | HEART RATE: 94 BPM | BODY MASS INDEX: 26.6 KG/M2 | SYSTOLIC BLOOD PRESSURE: 122 MMHG | HEIGHT: 70 IN

## 2018-06-25 DIAGNOSIS — E11.9 TYPE 2 DIABETES MELLITUS WITHOUT COMPLICATION, WITHOUT LONG-TERM CURRENT USE OF INSULIN: Primary | ICD-10-CM

## 2018-06-25 DIAGNOSIS — Z87.891 PERSONAL HISTORY OF TOBACCO USE: ICD-10-CM

## 2018-06-25 DIAGNOSIS — Z12.11 ENCOUNTER FOR SCREENING FOR MALIGNANT NEOPLASM OF COLON: ICD-10-CM

## 2018-06-25 DIAGNOSIS — I10 ESSENTIAL HYPERTENSION: ICD-10-CM

## 2018-06-25 DIAGNOSIS — Z11.59 NEED FOR HEPATITIS C SCREENING TEST: ICD-10-CM

## 2018-06-25 DIAGNOSIS — N40.0 BENIGN PROSTATIC HYPERPLASIA, UNSPECIFIED WHETHER LOWER URINARY TRACT SYMPTOMS PRESENT: ICD-10-CM

## 2018-06-25 PROCEDURE — 3078F DIAST BP <80 MM HG: CPT | Mod: CPTII,S$GLB,, | Performed by: FAMILY MEDICINE

## 2018-06-25 PROCEDURE — 3045F PR MOST RECENT HEMOGLOBIN A1C LEVEL 7.0-9.0%: CPT | Mod: CPTII,S$GLB,, | Performed by: FAMILY MEDICINE

## 2018-06-25 PROCEDURE — 99214 OFFICE O/P EST MOD 30 MIN: CPT | Mod: S$GLB,,, | Performed by: FAMILY MEDICINE

## 2018-06-25 PROCEDURE — 3074F SYST BP LT 130 MM HG: CPT | Mod: CPTII,S$GLB,, | Performed by: FAMILY MEDICINE

## 2018-06-25 NOTE — PROGRESS NOTES
Patient ID: Odilon Wilder is a 68 y.o. male.    Chief Complaint: Results (Discuss resent lab results )    HPI       Odilon Wilder is a 68 y.o. male here following up on somewhat new diagnosis of diabetes.  Patient has done dietary changes taking prescribed medicine and doing well overall.  Had 2 episodes of diarrhea during the middle of night since our last visit.  Not really happening during the day.  Unsure if correlation with food consumed.  Complaints of allergic rhinitis with itchy watery eyes and sneezing.  He takes Claritin and occasionally a nasal spray-unsure kind of nasal spray.  Elevated PSA-planned biopsy questionable cystoscopy mid July.      Review of Symptoms    Constitutional  Neg activity change, No chills/ fever   Resp  Neg hemoptysis, stridor, choking  CVS  Neg chest pain, palpitations    Physical Exam    Constitutional:   Oriented to person, place, and time.appears well-developed and well-nourished.   No distress.     HENT:   Head: Normocephalic and atraumatic.     Right Ear: Tympanic membrane, external ear and ear canal normal     Left Ear: Tympanic membrane, external ear and ear canal normal    Nose: External Normal. Normal turbinates, No rhinorrhea     Mouth/Throat: Uvula is midline, oropharynx is clear and moist and mucous membranes are normal.     Neck: supple no anterior cervical adenopathy      Eyes:   Conjunctivae are normal. Right eye exhibits no discharge. Left eye exhibits no discharge. No scleral icterus. No periorbital edema       Cardiovascular:  Regular rate, Regular rhythm     No extrasystole  Normal S1-S2    Pulmonary/Chest:   Normal effort and breath sounds.  No wheezing, rhonchi or rales.      Musculoskeletal:  No edema. No obvious deformity No wasting     Neurological:   Alert and oriented to person, place, and time. Coordination normal.     Skin:   Skin is warm and dry.   No diaphoresis.   No rash noted.    Psychiatric: Normal mood and affect. Behavior is normal.  Judgment and thought content normal.       Assessment / Plan:      ICD-10-CM ICD-9-CM   1. Type 2 diabetes mellitus without complication, without long-term current use of insulin E11.9 250.00   2. Essential hypertension I10 401.9   3. Benign prostatic hyperplasia, unspecified whether lower urinary tract symptoms present N40.0 600.00   4. Personal history of tobacco use Z87.891 V15.82   5. Encounter for screening for malignant neoplasm of colon Z12.11 V76.51   6. Need for hepatitis C screening test Z11.59 V73.89     Type 2 diabetes mellitus without complication, without long-term current use of insulin  -     Comprehensive metabolic panel; Future  -     Hemoglobin A1c; Future    Essential hypertension  -     Comprehensive metabolic panel; Future  -     Hemoglobin A1c; Future    Benign prostatic hyperplasia, unspecified whether lower urinary tract symptoms present    Personal history of tobacco use  -      Abdominal Aorta; Future; Expected date: 06/25/2018    Encounter for screening for malignant neoplasm of colon  -     Case request GI: COLONOSCOPY    Need for hepatitis C screening test  -     Hepatitis C antibody; Future; Expected date: 06/25/2018      Discussed diabetes and diabetic testing-for strips through [facility].  Colon cancer screening-discuss signing up for colonoscopy-wife just had 1 so understands the process.  Elevated PSA-procedure scheduled for July  Discussed diabetes follow up with hemoglobin A1c and lab work mid to late July.  Aortic aneurysm screening because of tobacco use-ordered.  Episodes of diarrhea-discussed possibility medicine/Glucophage.  However the infrequency does not correlate well.  Observe medicine changes if needed in the future

## 2018-06-27 ENCOUNTER — TELEPHONE (OUTPATIENT)
Dept: GASTROENTEROLOGY | Facility: CLINIC | Age: 68
End: 2018-06-27

## 2018-06-27 NOTE — TELEPHONE ENCOUNTER
Referral was sent from Dr. Porter to schedule an Colonoscopy, left an voicemail for patient to call the office back in regards to scheduling procedure.

## 2018-07-06 ENCOUNTER — TELEPHONE (OUTPATIENT)
Dept: GASTROENTEROLOGY | Facility: CLINIC | Age: 68
End: 2018-07-06

## 2018-07-06 NOTE — TELEPHONE ENCOUNTER
A message was left on patient's voicemail to give the office a call back in regards to scheduling a Colonoscopy.

## 2018-07-13 ENCOUNTER — LAB VISIT (OUTPATIENT)
Dept: LAB | Facility: HOSPITAL | Age: 68
End: 2018-07-13
Attending: FAMILY MEDICINE
Payer: MEDICARE

## 2018-07-13 DIAGNOSIS — E11.9 TYPE 2 DIABETES MELLITUS WITHOUT COMPLICATION: ICD-10-CM

## 2018-07-13 DIAGNOSIS — Z11.59 NEED FOR HEPATITIS C SCREENING TEST: ICD-10-CM

## 2018-07-13 DIAGNOSIS — I10 ESSENTIAL HYPERTENSION: ICD-10-CM

## 2018-07-13 DIAGNOSIS — E11.9 TYPE 2 DIABETES MELLITUS WITHOUT COMPLICATION, WITHOUT LONG-TERM CURRENT USE OF INSULIN: ICD-10-CM

## 2018-07-13 LAB
ALBUMIN SERPL BCP-MCNC: 4.1 G/DL
ALP SERPL-CCNC: 59 U/L
ALT SERPL W/O P-5'-P-CCNC: 12 U/L
ANION GAP SERPL CALC-SCNC: 7 MMOL/L
AST SERPL-CCNC: 16 U/L
BILIRUB SERPL-MCNC: 0.7 MG/DL
BUN SERPL-MCNC: 16 MG/DL
CALCIUM SERPL-MCNC: 9.6 MG/DL
CHLORIDE SERPL-SCNC: 102 MMOL/L
CHOLEST SERPL-MCNC: 112 MG/DL
CHOLEST/HDLC SERPL: 3.5 {RATIO}
CO2 SERPL-SCNC: 32 MMOL/L
CREAT SERPL-MCNC: 0.99 MG/DL
EST. GFR  (AFRICAN AMERICAN): >60 ML/MIN/1.73 M^2
EST. GFR  (NON AFRICAN AMERICAN): >60 ML/MIN/1.73 M^2
ESTIMATED AVG GLUCOSE: 126 MG/DL
GLUCOSE SERPL-MCNC: 143 MG/DL
HBA1C MFR BLD HPLC: 6 %
HDLC SERPL-MCNC: 32 MG/DL
HDLC SERPL: 28.6 %
LDLC SERPL CALC-MCNC: 62.8 MG/DL
NONHDLC SERPL-MCNC: 80 MG/DL
POTASSIUM SERPL-SCNC: 5.4 MMOL/L
PROT SERPL-MCNC: 7.4 G/DL
SODIUM SERPL-SCNC: 141 MMOL/L
TRIGL SERPL-MCNC: 86 MG/DL

## 2018-07-13 PROCEDURE — 83036 HEMOGLOBIN GLYCOSYLATED A1C: CPT

## 2018-07-13 PROCEDURE — 36415 COLL VENOUS BLD VENIPUNCTURE: CPT | Mod: PO

## 2018-07-13 PROCEDURE — 80061 LIPID PANEL: CPT

## 2018-07-13 PROCEDURE — 86803 HEPATITIS C AB TEST: CPT | Mod: PO

## 2018-07-13 PROCEDURE — 80053 COMPREHEN METABOLIC PANEL: CPT | Mod: PO

## 2018-07-16 LAB — HCV AB SERPL QL IA: NEGATIVE

## 2018-07-17 ENCOUNTER — HOSPITAL ENCOUNTER (OUTPATIENT)
Dept: UROLOGY | Facility: HOSPITAL | Age: 68
Discharge: HOME | End: 2018-07-17
Attending: UROLOGY
Payer: MEDICARE

## 2018-07-17 ENCOUNTER — TELEPHONE (OUTPATIENT)
Dept: FAMILY MEDICINE | Facility: CLINIC | Age: 68
End: 2018-07-17

## 2018-07-17 VITALS
SYSTOLIC BLOOD PRESSURE: 143 MMHG | DIASTOLIC BLOOD PRESSURE: 77 MMHG | HEIGHT: 70 IN | HEART RATE: 92 BPM | BODY MASS INDEX: 25.87 KG/M2 | RESPIRATION RATE: 18 BRPM | WEIGHT: 180.69 LBS

## 2018-07-17 DIAGNOSIS — R97.20 ELEVATED PSA: ICD-10-CM

## 2018-07-17 DIAGNOSIS — E87.5 HYPERKALEMIA: Primary | ICD-10-CM

## 2018-07-17 DIAGNOSIS — I10 ESSENTIAL HYPERTENSION: ICD-10-CM

## 2018-07-17 DIAGNOSIS — N40.1 BPH WITH URINARY OBSTRUCTION: ICD-10-CM

## 2018-07-17 DIAGNOSIS — R33.9 INCOMPLETE EMPTYING OF BLADDER: ICD-10-CM

## 2018-07-17 DIAGNOSIS — N13.8 BPH WITH URINARY OBSTRUCTION: ICD-10-CM

## 2018-07-17 PROCEDURE — 55700 PR BIOPSY OF PROSTATE,NEEDLE/PUNCH: CPT | Mod: ,,, | Performed by: UROLOGY

## 2018-07-17 PROCEDURE — 88305 TISSUE EXAM BY PATHOLOGIST: CPT | Performed by: PATHOLOGY

## 2018-07-17 PROCEDURE — 88342 IMHCHEM/IMCYTCHM 1ST ANTB: CPT | Mod: 26,,, | Performed by: PATHOLOGY

## 2018-07-17 PROCEDURE — 52000 CYSTOURETHROSCOPY: CPT

## 2018-07-17 PROCEDURE — 76872 US TRANSRECTAL: CPT

## 2018-07-17 PROCEDURE — 52000 CYSTOURETHROSCOPY: CPT | Mod: 59,,, | Performed by: UROLOGY

## 2018-07-17 PROCEDURE — 88305 TISSUE EXAM BY PATHOLOGIST: CPT | Mod: 26,,, | Performed by: PATHOLOGY

## 2018-07-17 PROCEDURE — 88342 IMHCHEM/IMCYTCHM 1ST ANTB: CPT | Performed by: PATHOLOGY

## 2018-07-17 PROCEDURE — 76942 ECHO GUIDE FOR BIOPSY: CPT | Mod: 26,59,, | Performed by: UROLOGY

## 2018-07-17 PROCEDURE — 76872 US TRANSRECTAL: CPT | Mod: 26,,, | Performed by: UROLOGY

## 2018-07-17 PROCEDURE — 76942 ECHO GUIDE FOR BIOPSY: CPT | Mod: 59

## 2018-07-17 PROCEDURE — 55700 HC BIOPSY OF PROSTATE - NEEDLE OR PUNCH: CPT

## 2018-07-17 PROCEDURE — 88341 IMHCHEM/IMCYTCHM EA ADD ANTB: CPT | Mod: 59 | Performed by: PATHOLOGY

## 2018-07-17 PROCEDURE — 88341 IMHCHEM/IMCYTCHM EA ADD ANTB: CPT | Mod: 26,,, | Performed by: PATHOLOGY

## 2018-07-17 RX ORDER — LIDOCAINE HYDROCHLORIDE 10 MG/ML
10 INJECTION INFILTRATION; PERINEURAL ONCE
Status: COMPLETED | OUTPATIENT
Start: 2018-07-17 | End: 2018-07-17

## 2018-07-17 RX ORDER — LIDOCAINE HYDROCHLORIDE 20 MG/ML
JELLY TOPICAL ONCE
Status: COMPLETED | OUTPATIENT
Start: 2018-07-17 | End: 2018-07-17

## 2018-07-17 RX ADMIN — LIDOCAINE HYDROCHLORIDE: 20 JELLY TOPICAL at 10:07

## 2018-07-17 RX ADMIN — LIDOCAINE HYDROCHLORIDE 10 ML: 10 INJECTION INFILTRATION; PERINEURAL at 10:07

## 2018-07-17 NOTE — PATIENT INSTRUCTIONS

## 2018-07-17 NOTE — PROCEDURES
Pre-procedure diagnosis: elevated psa\  Lab Results   Component Value Date    PSADIAG 10.3 (H) 06/19/2018    PSADIAG 12.5 (H) 05/14/2018       Post-procedure diagnosis: same    Procedure: Transrectal ultrasound guided prostate biopsy    Complications: none    Blood loss: minimal    Surgeon: Moy Warner MD    Anesthesia: 10cc lidocaine jelly, 20cc 1% lidocaine for prostatic block    TRUS size: 140 cc    Procedure: The risks and benefits of the procedure were explained to the patient and consent was obtained.  The patient laid in the lateral decubitus position.  10cc of lidocaine jelly was used for local analgesia in the rectum.  The ultrasound probe was advanced into the rectum. The prostate was visualized.  There was a median lobe.  20cc of 1% lidocaine without epi was used for a prostatic nerve block.  12 biopsies were then takes, 2 from the apex, mid and base from the right and left side.    The patient tolerated the procedure well and was discharged home.  He will follow-up in 1-2 weeks when the results are available for review.  He will finish the course of antibiotics.

## 2018-07-17 NOTE — PROCEDURES
Procedures: Flexible cystourethroscopy    Pre Procedure Diagnosis:  1. BPH  2. Urinary retention  3. Elevated psa      Post Procedure Diagnosis:Normal cystoscopy    Surgeon: Moy Warner MD    Anesthesia: 2% uro-jet lidocaine jelly for local analgesia    Flexible cysto-urethroscopy was performed after consent was obtained.  The risks and benefits were explained.    2% lidocaine urojet was used for local analgesia.  The genitalia was prepped and draped in the sterile fashion with betadine.    The flexible scope was advanced into the urethra and into the bladder.  Bilateral ureteral orifice were evaluated and noted to be normal with clear efflux.  The bladder was completely surveyed in a systematic fashion.   No bladder tumors or lesions were seen.  No strictures were noted.  The prostate showed Significant hypertrophy.  There was Mild median lobe present.    The patient tolerated the procedure well without complication.    They will follow up in 1 week to discuss results and discuss management of LUTs/retention.

## 2018-07-17 NOTE — H&P
"[facility]  History & Physical     Subjective:     Chief Complaint/Reason for Admission: bph, elevated psa    History of Present Illness:   Patient 68 y.o. male presents with a h/o urinary retention and elevated psa. He is here for cystoscopy and trus prostate biopsy. Also has a h/o microscopic hematuria without evidence of infection..     Lab Results   Component Value Date    PSADIAG 10.3 (H) 06/19/2018    PSADIAG 12.5 (H) 05/14/2018         There are no active problems to display for this patient.         (Not in a hospital admission)  Review of patient's allergies indicates:  No Known Allergies     Past Medical History:   Diagnosis Date    High glucose     Urinary tract infection       Past Surgical History:   Procedure Laterality Date    knee      ORTHOPEDIC SURGERY        Family History   Problem Relation Age of Onset    Diabetes Mother     Cancer Father     Prostate cancer Neg Hx     Kidney disease Neg Hx       Social History   Substance Use Topics    Smoking status: Former Smoker    Smokeless tobacco: Never Used      Comment: last time 40 cents a pack    Alcohol use Yes        Review of Systems  All other systems reviewed and negative except pertinent positives noted in HPI.      Physical Exam   Constitutional: He is oriented to person, place, and time. He appears well-developed and well-nourished. No distress.   HENT:   Head: Normocephalic and atraumatic.   Eyes: No scleral icterus.   Neck: No tracheal deviation present.   Pulmonary/Chest: Effort normal. No respiratory distress.   Neurological: He is alert and oriented to person, place, and time.   Skin: No rash noted.   Psychiatric: He has a normal mood and affect. His behavior is normal. Judgment and thought content normal.   Vitals reviewed.        OBJECTIVE:     Patient Vitals for the past 8 hrs:   Resp Height Weight   07/17/18 0936 18 5' 10" (1.778 m) 82 kg (180 lb 11 oz)         Data Review:  Microbiology Results (last 7 days)     " ** No results found for the last 168 hours. **        No results for input(s): COLORU, CLARITYU, SPECGRAV, PHUR, PROTEINUA, GLUCOSEU, BILIRUBINCON, BLOODU, WBCU, RBCU, BACTERIA, MUCUS, NITRITE, LEUKOCYTESUR, UROBILINOGEN, HYALINECASTS in the last 168 hours.      ASSESSMENT/PLAN:     There are no hospital problems to display for this patient.  There are no active problems to display for this patient.  69 yo male with bph, microscopic hematuria, and elevated psa here for cystoscopy as well as TRUS prostate biopsy.      Plan:  I have explained the indication, risks, benefits, and alternatives of the procedure in detail.  The patient voices understanding and all questions have been answered.  The patient agrees to proceed as planned with cystoscopy and TRUS prostate biopsy.

## 2018-07-17 NOTE — TELEPHONE ENCOUNTER
Your lab work is excellent-shows that you glucose is under superior control.    Continue on your current medications    For some reason your potassium is slightly elevated-I suspect this is not a true reading but rather the possibility of  the release of potassium from you're cells during the blood draw    I would like to repeat this in 2 weeks you do not have to fast-ordered at [facility]

## 2018-07-24 ENCOUNTER — OFFICE VISIT (OUTPATIENT)
Dept: UROLOGY | Facility: CLINIC | Age: 68
End: 2018-07-24
Payer: MEDICARE

## 2018-07-24 VITALS
DIASTOLIC BLOOD PRESSURE: 71 MMHG | SYSTOLIC BLOOD PRESSURE: 133 MMHG | HEART RATE: 89 BPM | TEMPERATURE: 98 F | BODY MASS INDEX: 25.48 KG/M2 | HEIGHT: 70 IN | WEIGHT: 178 LBS

## 2018-07-24 DIAGNOSIS — N40.1 BENIGN NON-NODULAR PROSTATIC HYPERPLASIA WITH LOWER URINARY TRACT SYMPTOMS: Primary | ICD-10-CM

## 2018-07-24 DIAGNOSIS — R33.9 URINARY RETENTION: ICD-10-CM

## 2018-07-24 DIAGNOSIS — R31.29 MICROSCOPIC HEMATURIA: ICD-10-CM

## 2018-07-24 DIAGNOSIS — Z46.6 ENCOUNTER FOR FOLEY CATHETER REMOVAL: ICD-10-CM

## 2018-07-24 DIAGNOSIS — N13.8 BPH WITH URINARY OBSTRUCTION: ICD-10-CM

## 2018-07-24 DIAGNOSIS — N40.1 BPH WITH URINARY OBSTRUCTION: ICD-10-CM

## 2018-07-24 DIAGNOSIS — R39.9 LOWER URINARY TRACT SYMPTOMS: ICD-10-CM

## 2018-07-24 DIAGNOSIS — I10 ESSENTIAL HYPERTENSION: ICD-10-CM

## 2018-07-24 DIAGNOSIS — R97.20 ELEVATED PSA: ICD-10-CM

## 2018-07-24 LAB
BACTERIA #/AREA URNS AUTO: ABNORMAL /HPF
BILIRUB SERPL-MCNC: NORMAL MG/DL
BILIRUB UR QL STRIP: NEGATIVE
BLOOD URINE, POC: NORMAL
CLARITY UR REFRACT.AUTO: ABNORMAL
COLOR UR AUTO: YELLOW
COLOR, POC UA: YELLOW
GLUCOSE UR QL STRIP: NEGATIVE
GLUCOSE UR QL STRIP: NORMAL
HGB UR QL STRIP: ABNORMAL
KETONES UR QL STRIP: NEGATIVE
KETONES UR QL STRIP: NORMAL
LEUKOCYTE ESTERASE UR QL STRIP: NEGATIVE
LEUKOCYTE ESTERASE URINE, POC: NORMAL
MICROSCOPIC COMMENT: ABNORMAL
NITRITE UR QL STRIP: NEGATIVE
NITRITE, POC UA: NORMAL
PH UR STRIP: 6 [PH] (ref 5–8)
PH, POC UA: 6
PROT UR QL STRIP: NEGATIVE
PROTEIN, POC: NORMAL
RBC #/AREA URNS AUTO: 18 /HPF (ref 0–4)
SP GR UR STRIP: 1.02 (ref 1–1.03)
SPECIFIC GRAVITY, POC UA: 10.15
URN SPEC COLLECT METH UR: ABNORMAL
UROBILINOGEN UR STRIP-ACNC: 1 EU/DL
UROBILINOGEN, POC UA: NORMAL
WBC #/AREA URNS AUTO: 0 /HPF (ref 0–5)

## 2018-07-24 PROCEDURE — 99214 OFFICE O/P EST MOD 30 MIN: CPT | Mod: 25,S$GLB,, | Performed by: UROLOGY

## 2018-07-24 PROCEDURE — 81001 URINALYSIS AUTO W/SCOPE: CPT

## 2018-07-24 PROCEDURE — 81001 URINALYSIS AUTO W/SCOPE: CPT | Mod: S$GLB,,, | Performed by: UROLOGY

## 2018-07-24 PROCEDURE — 3078F DIAST BP <80 MM HG: CPT | Mod: CPTII,S$GLB,, | Performed by: UROLOGY

## 2018-07-24 PROCEDURE — 87086 URINE CULTURE/COLONY COUNT: CPT

## 2018-07-24 PROCEDURE — 3075F SYST BP GE 130 - 139MM HG: CPT | Mod: CPTII,S$GLB,, | Performed by: UROLOGY

## 2018-07-24 PROCEDURE — 99999 PR PBB SHADOW E&M-EST. PATIENT-LVL IV: CPT | Mod: PBBFAC,,, | Performed by: UROLOGY

## 2018-07-24 RX ORDER — TAMSULOSIN HYDROCHLORIDE 0.4 MG/1
0.4 CAPSULE ORAL DAILY
Qty: 90 CAPSULE | Refills: 3 | Status: SHIPPED | OUTPATIENT
Start: 2018-07-24 | End: 2020-02-26

## 2018-07-24 NOTE — PROGRESS NOTES
Subjective:      Patient ID: Odilon Wilder is a 68 y.o. male.    Chief Complaint: Other (discuss luts)    HPI  Patient is a 68 y.o. male who is established to our clinic and was initially referred by their PCP, Dr. Stapleton for evaluation of LUTs/BPH.     Benign Prostatic Hypertrophy  Patient complains of lower urinary tract symptoms. He reports frequency, incomplete emptying, nocturia one time a night, urgency and weak stream. He denies intermittency and straining. Patient states symptoms are of mild severity. Onset of symptoms was several years ago and was gradual in onset. His AUA Symptom Score is, 6/3 manifested as irritative symptoms including frequency, urgency, nocturia and obstructive symptoms including incomplete emptying, weak stream. He has no personal history and no family history of prostate cancer. He reports a history of urinary retention requiring a toscano catheter in the past. He denies flank pain, gross hematuria, kidney stones and recurrent UTI.    He underwent a TRUS biopsy and cystoscopy 1 week ago.  Biopsy resu    Urine dip: +++blood, nitrite neg, leuk -.     Lab Results   Component Value Date    PSADIAG 10.3 (H) 06/19/2018    PSADIAG 12.5 (H) 05/14/2018         Review of Systems  All other systems reviewed and negative except pertinent positives noted in HPI.    Objective:     Physical Exam   Constitutional: He is oriented to person, place, and time. He appears well-developed and well-nourished. No distress.   HENT:   Head: Normocephalic and atraumatic.   Eyes: No scleral icterus.   Neck: No tracheal deviation present.   Pulmonary/Chest: Effort normal. No respiratory distress.   Neurological: He is alert and oriented to person, place, and time.   Psychiatric: He has a normal mood and affect. His behavior is normal. Judgment and thought content normal.     Assessment:     1. Benign non-nodular prostatic hyperplasia with lower urinary tract symptoms    2. Lower urinary tract symptoms    3.  Elevated PSA    4. Urinary retention      Plan:     1. BPH/urinary retention:  -A post void residual bladder scan was performed immediately after voiding. The patient's PVR was noted to be 191cc.  -Avoid bladder irritants including but not limited to caffeine, alcohol, smoking, spicy foods, acidic foods, tomato-based products, citrus, artificial sweeteners, chocolate, coffee or tea.  -urine dip: +++blood, otherwise negative  -prostate meds: flomax; continue.   -renal US to look for upper tract issues with urinary retention.  Serum Cr reviewed and wnl.   If upper tract hydronephrosis, may need an outlet procedure.    2. PSA:  -pathology pending from biopsy--will call with results.     3. Microscopic hematuria:  -expected with post-biopsy    4. HTN:  -BP reviewed  -stable, continue meds and f/u with PCP

## 2018-07-25 LAB — BACTERIA UR CULT: NO GROWTH

## 2018-07-27 ENCOUNTER — TELEPHONE (OUTPATIENT)
Dept: UROLOGY | Facility: CLINIC | Age: 68
End: 2018-07-27

## 2018-07-30 ENCOUNTER — TELEPHONE (OUTPATIENT)
Dept: UROLOGY | Facility: CLINIC | Age: 68
End: 2018-07-30

## 2018-07-30 NOTE — TELEPHONE ENCOUNTER
He was informed of his prostate biopsy results.  We discussed results  Will have Dr. Warner nurse set up a talk to discussed findings and plan of care.  He voiced understanding

## 2018-07-30 NOTE — TELEPHONE ENCOUNTER
----- Message from Madhavi Najera LPN sent at 7/30/2018 10:58 AM CDT -----  Contact: [phone]      ----- Message -----  From: Hailee Mares MA  Sent: 7/30/2018  10:55 AM  To: [facility] Staff    Patient Returning Call from [facility]      Who Left Message for Patient: Leighann Ortiz    Communication Preference: [phone]    Additional Information: Re: Pt's Bx results

## 2018-08-01 ENCOUNTER — RESEARCH ENCOUNTER (OUTPATIENT)
Dept: RESEARCH | Facility: HOSPITAL | Age: 68
End: 2018-08-01

## 2018-08-01 ENCOUNTER — OFFICE VISIT (OUTPATIENT)
Dept: UROLOGY | Facility: CLINIC | Age: 68
End: 2018-08-01
Payer: MEDICARE

## 2018-08-01 VITALS
TEMPERATURE: 97 F | DIASTOLIC BLOOD PRESSURE: 73 MMHG | WEIGHT: 182 LBS | HEIGHT: 70 IN | SYSTOLIC BLOOD PRESSURE: 146 MMHG | BODY MASS INDEX: 26.05 KG/M2 | HEART RATE: 78 BPM

## 2018-08-01 DIAGNOSIS — R33.9 URINARY RETENTION: ICD-10-CM

## 2018-08-01 DIAGNOSIS — D49.59 NEOPLASM OF PROSTATE: ICD-10-CM

## 2018-08-01 DIAGNOSIS — C61 PROSTATE CANCER: Primary | ICD-10-CM

## 2018-08-01 DIAGNOSIS — N40.1 BENIGN NON-NODULAR PROSTATIC HYPERPLASIA WITH LOWER URINARY TRACT SYMPTOMS: ICD-10-CM

## 2018-08-01 PROCEDURE — 3077F SYST BP >= 140 MM HG: CPT | Mod: CPTII,S$GLB,, | Performed by: UROLOGY

## 2018-08-01 PROCEDURE — 99999 PR PBB SHADOW E&M-EST. PATIENT-LVL III: CPT | Mod: PBBFAC,,, | Performed by: UROLOGY

## 2018-08-01 PROCEDURE — 99215 OFFICE O/P EST HI 40 MIN: CPT | Mod: S$GLB,,, | Performed by: UROLOGY

## 2018-08-01 PROCEDURE — 99499 UNLISTED E&M SERVICE: CPT | Mod: S$GLB,,, | Performed by: UROLOGY

## 2018-08-01 PROCEDURE — 3078F DIAST BP <80 MM HG: CPT | Mod: CPTII,S$GLB,, | Performed by: UROLOGY

## 2018-08-01 NOTE — PROGRESS NOTES
Subjective:       Patient ID: Odilon Wilder is a 68 y.o. male.    Chief Complaint:  Other (biopsy results)      History of Present Illness  HPI  Patient is a 68 y.o. male who is established to our clinic and was initially referred by their PCP, Dr. Stapleton for evaluation of elevated psa.    Patient underwent a TRUS prostate biopsy on 7/17/18.  TRUS volume was 140cc.  He did well after the biopsy.  No complaints today.     Lab Results   Component Value Date    PSADIAG 10.3 (H) 06/19/2018    PSADIAG 12.5 (H) 05/14/2018         PATHOLOGY:   SPECIMEN  1) Prostate, left apex.  2) Prostate, left middle.  3) Prostate, left base.  4) Prostate, right apex.  5) Prostate, right middle.  6) Prostate, right base.  FINAL PATHOLOGIC DIAGNOSIS  1. PROSTATE, LEFT APEX, BIOPSY:  -Atypical small acinar proliferation (ASAP).  -Immunohistochemical stains for AMACR, P63 and HMWK were performed with adequate controls however focus  of interest was not present on deeper sections.  2. PROSTATE, LEFT MID, BIOPSY:  -Benign prostatic tissue with partial atrophy.  -Immunohistochemical stains for AMACR, P63 and HMWK were performed with adequate controls and support the  diagnosis.  3. PROSTATE, LEFT BASE, BIOPSY:  -Benign prostatic tissue with adenosis.  -Immunohistochemical stains for AMACR, P63 and HMWK were performed with adequate controls and support the  diagnosis.  4. PROSTATE, RIGHT APEX, BIOPSY:  -Minute focus of prostatic adenocarcinoma, Casco score 3+3=6.  -Tumor occupies 1% of the tissue submitted (1 Of 2 cores involved).  -Immunohistochemical stains for AMACR, P63 and HMWK were performed with adequate controls and support the  diagnosis.  5. PROSTATE, RIGHT MID, BIOPSY:  -Benign prostatic tissue.  6. PROSTATE, RIGHT BASE, BIOPSY:  -Prostatic adenocarcinoma, Casco score 3+3=6.  -Tumor occupies 20% of the tissue submitted (1 of 2 cores involved).    Review of Systems  Review of Systems  All other systems reviewed and  negative except pertinent positives noted in HPI.       Objective:     Physical Exam   Constitutional: He is oriented to person, place, and time. He appears well-developed and well-nourished. No distress.   HENT:   Head: Normocephalic and atraumatic.   Eyes: No scleral icterus.   Neck: No tracheal deviation present.   Pulmonary/Chest: Effort normal. No respiratory distress.   Neurological: He is alert and oriented to person, place, and time.   Psychiatric: He has a normal mood and affect. His behavior is normal. Judgment and thought content normal.       Lab Review  Lab Results   Component Value Date    COLORU Yellow 07/24/2018    SPECGRAV 1.025 07/24/2018    PHUR 6.0 07/24/2018    WBCUR n 07/24/2018    NITRITE Negative 07/24/2018    PROTEINUR n 07/24/2018    GLUCOSEUR n 07/24/2018    KETONESU Negative 07/24/2018    UROBILINOGEN 1.0 07/24/2018    BILIRUBINUR n 07/24/2018    RBCUR 3+ 07/24/2018         Assessment:        1. Prostate cancer    2. Neoplasm of prostate    3. Benign non-nodular prostatic hyperplasia with lower urinary tract symptoms    4. Urinary retention            Plan:     Prostate cancer  -     Basic metabolic panel; Future; Expected date: 12/01/2018  -     Prostate Specific Antigen, Diagnostic; Future; Expected date: 01/28/2019    Neoplasm of prostate  -     MRI Prostate W W/O Contrast; Future; Expected date: 12/01/2018    Benign non-nodular prostatic hyperplasia with lower urinary tract symptoms    Urinary retention      Today's visit was spent almost entirely on counseling. 45 minutes of counseling time was spent.  We reviewed his diagnosis, stage, grade, and prognosis.  We reviewed the [place] nomograms. We discussed the concept of low risk, moderate risk, and high risk disease. We discussed the different treatment options including active surveillance (as well as surveillance protocol), prostate brachytherapy, IMRT, IGRT, cryotherapy, and both open and robotic prostatectomy.We also  discussed the advantages, disadvantages, risks and benefits of the different options. Regarding radiation therapy we discussed treatment planning, the different techniques, short and long term complications. These included radiation cystitis, radiation proctitis, and impotence. We discussed success, failure, and salvage therapeutic options.     We discussed surgical therapy in depth including preoperative preparation, surgical technique (including bladder neck and nerve-sparing techniques), postoperative recuperation and recovery, and short and long term complications. We discussed the risks of reoperation, incontinence and impotence. We discussed the chance of rectal injury, obturator nerve injury, and occult injury to the bowel during verress needle access.  We discussed preop and postop Kegels, post op penile rehab, and treatment options for incontinence and impotence. We discussed success, failure and salvage therapeutic options. We discussed the possible  indications for adjuvant radiation therapy.      -discussed his urinary retention/bph---he is happy with his voiding. His last issue was when he went off the flomax.  We discussed finasteride but he is not interested in this right now.     -mri and bmp in 4 months  -psa in 6months.

## 2018-08-01 NOTE — PROGRESS NOTES
"Protocol: [id number], Testing Decision Aids to Improve Prostate Cancer Decisions for Minority Men  Sponsor: [employer]  IRB# [id number]  Investigator: Dr. AMANDA Warner  Patient Initials:YOVANY HUSAIN        Informed Consent Process     Research coordinator met with patient on Wednesday, August 1, 2018 to discussed the above mentioned study. Patient was awake, alert, and oriented to person, place, and time. He stated he was informed and eligible for the above-mentioned trial and expressed interest in discussing participation as outline below. Consent was discussed in detailed.     The patient states he understands the purpose of the trial: to test whether the use of a decision aid (a visual aid with educational information) can improve patients' knowledge of their condition and options for treatment, and whether using a decision aid can help when talking with their doctor. Patient stated understanding.  Length of Study and Number of participants were discussed. The patient will be enrolled in the study for 12 months. About 172 men will participate in this study. Patient stated understanding.  Procedures were discussed. A computer will by chance assign the hospital to one of four groups in the study (randomization). Neither patient nor his doctor can choose the group he will be in. [facility] was assigned Group 2- use one decision aid ("Knowing your Options"), just before his visit with the urologist. Group 2 will be asked to go to the clinic for his visit with the urologist early so that he will have time to use the decision aid. All Groups- After patient's visit with the urologist, he will be asked to complete a survey about his treatment choice, his knowledge about his disease and treatment choices, and his experience in discussing treatment options. All Groups- About 1 year after his visit with the urologist, if he has an appointment with him or her, patient will be asked to complete another survey to ask him about his prostate " cancer treatment choice and about the results of that choice. If he does not have an appointment with his urologist at this time, research staff from his doctor's office will mail to him the survey so that he can complete it at home. A stamped, addressed envelope will be provided with the survey. The survey should take about 15 to 20 minutes to complete. Patient stated understanding.  Risks- Patient may lose time at work or home and spend more time in the hospital or doctor's office than usual. He may be asked sensitive or private questions which he normally does not discuss. He doesn't have to answer any question that he does not want to during the surveys. Patient stated understanding.  Potential Benefits- were discussed. It is not possible to know now if a decision aid for prostate cancer treatment decisions will increase patient's knowledge of treatment options and help him talk with his urologist about his treatment preferences. It is also not known if a decision aid used before his doctor visit or during his doctor visit is more helpful. This study will help researchers learn how to better help people in the future who must make difficult treatment decisions. Patient stated understanding.  Costs were discussed. There are no costs for participating in this study. Patient will not be paid for taking part in this study. Although the sponsor may pay for certain study-related items and services, any other tests, procedures, or medications that may be necessary for the treatment of his medical condition will be billed to his insurance in the normal way. He may be responsible for co-payments or deductibles. These costs are not covered by this research study. If patient have any questions about treatment for which he may be responsible for paying and is encouraging to discuss this with his physician or study staff. Patient stated understanding.  Alternatives were discussed. Patient does not have to join this study. If  he does not join, his care at [facility] will not be affected. Patient stated understanding.  Voluntary participation, new findings, and study withdrawal were discussed. Participation in this study is voluntary and he may withdraw consent at any time. Patient stated understanding.  Confidentiality and HIPPA were reviewed as per listed in ICF. Patient stated understanding.     After the about information was reviewed, the patient was given the opportunity to ask questions and all questions were answered to their satisfaction. The patient stated he freely consented to participate in this trial and signed consent form unassisted. Patient was given a signed copy of the consent form. My contact information was given to the patient in the event he has any questions as it relates to above-mentioned trial. He was also encouraged to visit, clinicaltrials.gov should he wish to find out more information about this trial. Patient stated understanding.

## 2018-08-02 ENCOUNTER — RESEARCH ENCOUNTER (OUTPATIENT)
Dept: RESEARCH | Facility: HOSPITAL | Age: 68
End: 2018-08-02

## 2018-08-02 NOTE — PROGRESS NOTES
Protocol: [id number], Testing Decision Aids to Improve Prostate Cancer Decisions for Minority Men  Sponsor: [employer]  IRB # [id number]  Investigator: Dr. AMANDA Warner  Patient Initials: YOVANY HUSAIN     Eligibility Note     The patient is eligible for above-mentioned trial based on the following per section 3.2 of protocol:     Patient biopsy was within 4 months prior to registration showing newly diagnosed prostate cancer, per pathology report from 2018 and Dr. Warner notes.  Patient PSA was 10.3 which is < 50 ng/ML.  Patient does not have a history of non-cutaneous malignancy in the previous 5 years.  Patient first consultation was performed by Dr. Warner on 2018  Patient is not enrolled in any other clinical trial for the treatment of cancer.  Patient has no impaired decision-making capacity.  Patient is able to read and comprehend English.  Patient  is 1950 68yrs old which is > 18 years old.

## 2018-11-30 ENCOUNTER — LAB VISIT (OUTPATIENT)
Dept: LAB | Facility: HOSPITAL | Age: 68
End: 2018-11-30
Attending: UROLOGY
Payer: MEDICARE

## 2018-11-30 DIAGNOSIS — C61 PROSTATE CANCER: ICD-10-CM

## 2018-11-30 LAB
ANION GAP SERPL CALC-SCNC: 7 MMOL/L
BUN SERPL-MCNC: 23 MG/DL
CALCIUM SERPL-MCNC: 10.6 MG/DL
CHLORIDE SERPL-SCNC: 98 MMOL/L
CO2 SERPL-SCNC: 32 MMOL/L
CREAT SERPL-MCNC: 1.03 MG/DL
EST. GFR  (AFRICAN AMERICAN): >60 ML/MIN/1.73 M^2
EST. GFR  (NON AFRICAN AMERICAN): >60 ML/MIN/1.73 M^2
GLUCOSE SERPL-MCNC: 131 MG/DL
POTASSIUM SERPL-SCNC: 5 MMOL/L
SODIUM SERPL-SCNC: 137 MMOL/L

## 2018-11-30 PROCEDURE — 36415 COLL VENOUS BLD VENIPUNCTURE: CPT | Mod: PO

## 2018-11-30 PROCEDURE — 80048 BASIC METABOLIC PNL TOTAL CA: CPT | Mod: PO

## 2018-12-07 ENCOUNTER — HOSPITAL ENCOUNTER (OUTPATIENT)
Dept: RADIOLOGY | Facility: HOSPITAL | Age: 68
Discharge: HOME | End: 2018-12-07
Attending: UROLOGY
Payer: MEDICARE

## 2018-12-07 DIAGNOSIS — D49.59 NEOPLASM OF PROSTATE: ICD-10-CM

## 2018-12-07 PROCEDURE — 72195 MRI PELVIS W/O DYE: CPT | Mod: 26,,, | Performed by: RADIOLOGY

## 2018-12-07 PROCEDURE — 72195 MRI PELVIS W/O DYE: CPT | Mod: TC

## 2018-12-18 ENCOUNTER — OFFICE VISIT (OUTPATIENT)
Dept: UROLOGY | Facility: CLINIC | Age: 68
End: 2018-12-18
Payer: MEDICARE

## 2018-12-18 VITALS
TEMPERATURE: 98 F | BODY MASS INDEX: 24.62 KG/M2 | HEIGHT: 70 IN | HEART RATE: 95 BPM | WEIGHT: 172 LBS | SYSTOLIC BLOOD PRESSURE: 143 MMHG | DIASTOLIC BLOOD PRESSURE: 72 MMHG

## 2018-12-18 DIAGNOSIS — C61 PROSTATE CANCER: Primary | ICD-10-CM

## 2018-12-18 DIAGNOSIS — N13.8 BPH WITH URINARY OBSTRUCTION: ICD-10-CM

## 2018-12-18 DIAGNOSIS — N40.1 BPH WITH URINARY OBSTRUCTION: ICD-10-CM

## 2018-12-18 DIAGNOSIS — I10 ESSENTIAL HYPERTENSION: ICD-10-CM

## 2018-12-18 PROCEDURE — 99999 PR PBB SHADOW E&M-EST. PATIENT-LVL III: CPT | Mod: PBBFAC,,, | Performed by: UROLOGY

## 2018-12-18 PROCEDURE — 3078F DIAST BP <80 MM HG: CPT | Mod: CPTII,S$GLB,, | Performed by: UROLOGY

## 2018-12-18 PROCEDURE — 99214 OFFICE O/P EST MOD 30 MIN: CPT | Mod: S$GLB,,, | Performed by: UROLOGY

## 2018-12-18 PROCEDURE — 3077F SYST BP >= 140 MM HG: CPT | Mod: CPTII,S$GLB,, | Performed by: UROLOGY

## 2018-12-18 PROCEDURE — 1101F PT FALLS ASSESS-DOCD LE1/YR: CPT | Mod: CPTII,S$GLB,, | Performed by: UROLOGY

## 2018-12-18 RX ORDER — CIPROFLOXACIN 500 MG/1
TABLET ORAL
Qty: 4 TABLET | Refills: 0 | Status: SHIPPED | OUTPATIENT
Start: 2018-12-18 | End: 2019-01-09

## 2018-12-18 NOTE — PROGRESS NOTES
Subjective:       Patient ID: Odilon Wilder is a 68 y.o. male.    Chief Complaint:  Other (mri results)      History of Present Illness  HPI  Patient is a 68 y.o. male who is established to our clinic and was initially referred by their PCP, Dr. Stapleton for evaluation of elevated psa.    Patient underwent a TRUS prostate biopsy on 7/17/18.  TRUS volume was 140cc.  His pathology showed Elmwood 3+3 prostate cancer.  He then underwent an MRI of the prostate.  MRI volume--137cc. PIRADS 4 lesion, 1.3cm right apex.     Lab Results   Component Value Date    PSADIAG 10.3 (H) 06/19/2018    PSADIAG 12.5 (H) 05/14/2018         PATHOLOGY:   SPECIMEN  1) Prostate, left apex.  2) Prostate, left middle.  3) Prostate, left base.  4) Prostate, right apex.  5) Prostate, right middle.  6) Prostate, right base.  FINAL PATHOLOGIC DIAGNOSIS  1. PROSTATE, LEFT APEX, BIOPSY:  -Atypical small acinar proliferation (ASAP).  -Immunohistochemical stains for AMACR, P63 and HMWK were performed with adequate controls however focus  of interest was not present on deeper sections.  2. PROSTATE, LEFT MID, BIOPSY:  -Benign prostatic tissue with partial atrophy.  -Immunohistochemical stains for AMACR, P63 and HMWK were performed with adequate controls and support the  diagnosis.  3. PROSTATE, LEFT BASE, BIOPSY:  -Benign prostatic tissue with adenosis.  -Immunohistochemical stains for AMACR, P63 and HMWK were performed with adequate controls and support the  diagnosis.  4. PROSTATE, RIGHT APEX, BIOPSY:  -Minute focus of prostatic adenocarcinoma, Zeferino score 3+3=6.  -Tumor occupies 1% of the tissue submitted (1 Of 2 cores involved).  -Immunohistochemical stains for AMACR, P63 and HMWK were performed with adequate controls and support the  diagnosis.  5. PROSTATE, RIGHT MID, BIOPSY:  -Benign prostatic tissue.  6. PROSTATE, RIGHT BASE, BIOPSY:  -Prostatic adenocarcinoma, Elmwood score 3+3=6.  -Tumor occupies 20% of the tissue submitted (1 of 2  cores involved).    Review of Systems  Review of Systems  All other systems reviewed and negative except pertinent positives noted in HPI.       Objective:     Physical Exam   Constitutional: He is oriented to person, place, and time. He appears well-developed and well-nourished. No distress.   HENT:   Head: Normocephalic and atraumatic.   Eyes: No scleral icterus.   Neck: No tracheal deviation present.   Pulmonary/Chest: Effort normal. No respiratory distress.   Neurological: He is alert and oriented to person, place, and time.   Psychiatric: He has a normal mood and affect. His behavior is normal. Judgment and thought content normal.       Lab Review  Lab Results   Component Value Date    COLORU Yellow 07/24/2018    SPECGRAV 1.025 07/24/2018    PHUR 6.0 07/24/2018    WBCUR n 07/24/2018    NITRITE Negative 07/24/2018    PROTEINUR n 07/24/2018    GLUCOSEUR n 07/24/2018    KETONESU Negative 07/24/2018    UROBILINOGEN 1.0 07/24/2018    BILIRUBINUR n 07/24/2018    RBCUR 3+ 07/24/2018         Assessment:        1. Prostate cancer    2. BPH with urinary obstruction    3. Essential hypertension            Plan:     Prostate cancer    BPH with urinary obstruction    Essential hypertension      -recommend URONAV biopsy based on MRI findings of a PIRADs 4 lesion in the right apex.     --BP reviewed today and elevated  -continue current medications  -encouraged f/u and discussion of BP with PCP.     -continue tamsulosin--no refills needed per the patient.

## 2019-01-08 ENCOUNTER — LAB VISIT (OUTPATIENT)
Dept: LAB | Facility: HOSPITAL | Age: 69
End: 2019-01-08
Attending: UROLOGY
Payer: MEDICARE

## 2019-01-08 DIAGNOSIS — C61 PROSTATE CANCER: ICD-10-CM

## 2019-01-08 LAB — COMPLEXED PSA SERPL-MCNC: 11.5 NG/ML

## 2019-01-08 PROCEDURE — 36415 COLL VENOUS BLD VENIPUNCTURE: CPT | Mod: PO,ER

## 2019-01-08 PROCEDURE — 84153 ASSAY OF PSA TOTAL: CPT

## 2019-01-09 ENCOUNTER — OFFICE VISIT (OUTPATIENT)
Dept: FAMILY MEDICINE | Facility: CLINIC | Age: 69
End: 2019-01-09
Payer: MEDICARE

## 2019-01-09 VITALS
SYSTOLIC BLOOD PRESSURE: 116 MMHG | BODY MASS INDEX: 24.95 KG/M2 | TEMPERATURE: 98 F | DIASTOLIC BLOOD PRESSURE: 72 MMHG | OXYGEN SATURATION: 96 % | HEIGHT: 70 IN | WEIGHT: 174.25 LBS | HEART RATE: 90 BPM

## 2019-01-09 DIAGNOSIS — R05.9 COUGH: Primary | ICD-10-CM

## 2019-01-09 PROCEDURE — 3074F PR MOST RECENT SYSTOLIC BLOOD PRESSURE < 130 MM HG: ICD-10-PCS | Mod: CPTII,S$GLB,, | Performed by: FAMILY MEDICINE

## 2019-01-09 PROCEDURE — 1101F PT FALLS ASSESS-DOCD LE1/YR: CPT | Mod: CPTII,S$GLB,, | Performed by: FAMILY MEDICINE

## 2019-01-09 PROCEDURE — 1101F PR PT FALLS ASSESS DOC 0-1 FALLS W/OUT INJ PAST YR: ICD-10-PCS | Mod: CPTII,S$GLB,, | Performed by: FAMILY MEDICINE

## 2019-01-09 PROCEDURE — 3078F DIAST BP <80 MM HG: CPT | Mod: CPTII,S$GLB,, | Performed by: FAMILY MEDICINE

## 2019-01-09 PROCEDURE — 3078F PR MOST RECENT DIASTOLIC BLOOD PRESSURE < 80 MM HG: ICD-10-PCS | Mod: CPTII,S$GLB,, | Performed by: FAMILY MEDICINE

## 2019-01-09 PROCEDURE — 3074F SYST BP LT 130 MM HG: CPT | Mod: CPTII,S$GLB,, | Performed by: FAMILY MEDICINE

## 2019-01-09 PROCEDURE — 99213 OFFICE O/P EST LOW 20 MIN: CPT | Mod: S$GLB,,, | Performed by: FAMILY MEDICINE

## 2019-01-09 PROCEDURE — 99213 PR OFFICE/OUTPT VISIT, EST, LEVL III, 20-29 MIN: ICD-10-PCS | Mod: S$GLB,,, | Performed by: FAMILY MEDICINE

## 2019-01-09 RX ORDER — BENZONATATE 200 MG/1
200 CAPSULE ORAL 3 TIMES DAILY PRN
Qty: 30 CAPSULE | Refills: 0 | Status: SHIPPED | OUTPATIENT
Start: 2019-01-09 | End: 2019-01-19

## 2019-01-09 RX ORDER — NAPROXEN 500 MG/1
500 TABLET ORAL 2 TIMES DAILY WITH MEALS
Qty: 20 TABLET | Refills: 0 | Status: SHIPPED | OUTPATIENT
Start: 2019-01-09 | End: 2019-08-21

## 2019-01-09 NOTE — PROGRESS NOTES
Subjective:       Patient ID: Odilon Wilder is a 68 y.o. male.    Chief Complaint: cough, sinus congestion, shoulder pain    URI    This is a new problem. The current episode started 1 to 4 weeks ago. The problem has been gradually improving. There has been no fever. Associated symptoms include congestion, coughing and rhinorrhea. Pertinent negatives include no abdominal pain, chest pain, diarrhea, dysuria, ear pain, headaches, joint pain, joint swelling, nausea, neck pain, plugged ear sensation, rash, sinus pain, sneezing, sore throat, swollen glands, vomiting or wheezing. He has tried nothing for the symptoms.   Shoulder Pain    The pain is present in the right shoulder and right arm. This is a new problem. The current episode started more than 1 month ago. There has been no history of extremity trauma. The problem occurs constantly. The problem has been gradually worsening. The quality of the pain is described as aching and dull. Pertinent negatives include no fever, headaches, inability to bear weight, itching, joint locking, joint swelling, limited range of motion, numbness, stiffness, tingling or visual symptoms. The symptoms are aggravated by activity and lying down. He has tried nothing for the symptoms. There is no history of diabetes, Injuries to Extremity or migraines.     Review of Systems   Constitutional: Negative for activity change, appetite change, chills, fatigue and fever.   HENT: Positive for congestion and rhinorrhea. Negative for ear discharge, ear pain, hearing loss, mouth sores, sinus pain, sneezing, sore throat and trouble swallowing.    Eyes: Negative for photophobia, pain, discharge and visual disturbance.   Respiratory: Positive for cough. Negative for chest tightness, shortness of breath and wheezing.    Cardiovascular: Negative for chest pain, palpitations and leg swelling.   Gastrointestinal: Negative for abdominal distention, abdominal pain, blood in stool, constipation, diarrhea,  nausea and vomiting.   Genitourinary: Negative for difficulty urinating, dysuria and flank pain.   Musculoskeletal: Negative for arthralgias, back pain, gait problem, joint pain, joint swelling, myalgias, neck pain and stiffness.   Skin: Negative for color change, itching and rash.   Neurological: Negative for dizziness, tingling, tremors, speech difficulty, light-headedness, numbness and headaches.   Psychiatric/Behavioral: Negative for behavioral problems, confusion, hallucinations, sleep disturbance and suicidal ideas.       Objective:      Physical Exam   Constitutional: He appears well-developed.   HENT:   Head: Normocephalic.   Right Ear: Tympanic membrane, external ear and ear canal normal.   Left Ear: Tympanic membrane and external ear normal.   Mouth/Throat: Posterior oropharyngeal erythema present.   Eyes: Conjunctivae are normal.   Neck: Normal range of motion. Neck supple.   Cardiovascular: Normal rate and regular rhythm.   Pulmonary/Chest: Effort normal and breath sounds normal.   Musculoskeletal:        Right shoulder: He exhibits tenderness. He exhibits normal range of motion, no bony tenderness, no swelling, no effusion, no deformity, no pain and no spasm.   Tenderness in the subacromial and deltoid bursa and surrounding muscles.    Neurological: He is alert.   Skin: Skin is warm.   Psychiatric: He has a normal mood and affect.   Nursing note and vitals reviewed.      Assessment:       Cough  -     benzonatate (TESSALON) 200 MG capsule; Take 1 capsule (200 mg total) by mouth 3 (three) times daily as needed for Cough.  Dispense: 30 capsule; Refill: 0    Bursitis/tendonitis, shoulder  -     naproxen (NAPROSYN) 500 MG tablet; Take 1 tablet (500 mg total) by mouth 2 (two) times daily with meals.  Dispense: 20 tablet; Refill: 0

## 2019-01-15 ENCOUNTER — PROCEDURE VISIT (OUTPATIENT)
Dept: UROLOGY | Facility: CLINIC | Age: 69
End: 2019-01-15
Payer: MEDICARE

## 2019-01-15 VITALS
WEIGHT: 169.75 LBS | BODY MASS INDEX: 24.3 KG/M2 | RESPIRATION RATE: 16 BRPM | TEMPERATURE: 98 F | HEART RATE: 77 BPM | SYSTOLIC BLOOD PRESSURE: 131 MMHG | DIASTOLIC BLOOD PRESSURE: 69 MMHG | HEIGHT: 70 IN

## 2019-01-15 DIAGNOSIS — C61 PROSTATE CANCER: ICD-10-CM

## 2019-01-15 PROCEDURE — 88342 TISSUE SPECIMEN TO PATHOLOGY, UROLOGY: ICD-10-PCS | Mod: 26,,, | Performed by: PATHOLOGY

## 2019-01-15 PROCEDURE — 88341 IMHCHEM/IMCYTCHM EA ADD ANTB: CPT | Performed by: PATHOLOGY

## 2019-01-15 PROCEDURE — 88305 TISSUE EXAM BY PATHOLOGIST: CPT | Mod: 26,,, | Performed by: PATHOLOGY

## 2019-01-15 PROCEDURE — 88305 TISSUE SPECIMEN TO PATHOLOGY, UROLOGY: ICD-10-PCS | Mod: 26,,, | Performed by: PATHOLOGY

## 2019-01-15 PROCEDURE — 55700 PR BIOPSY OF PROSTATE,NEEDLE/PUNCH: ICD-10-PCS | Mod: S$GLB,,, | Performed by: UROLOGY

## 2019-01-15 PROCEDURE — 76872 PR US TRANSRECTAL: ICD-10-PCS | Mod: S$GLB,,, | Performed by: UROLOGY

## 2019-01-15 PROCEDURE — 88341 IMHCHEM/IMCYTCHM EA ADD ANTB: CPT | Mod: 26,,, | Performed by: PATHOLOGY

## 2019-01-15 PROCEDURE — 88341 TISSUE SPECIMEN TO PATHOLOGY, UROLOGY: ICD-10-PCS | Mod: 26,,, | Performed by: PATHOLOGY

## 2019-01-15 PROCEDURE — 88342 IMHCHEM/IMCYTCHM 1ST ANTB: CPT | Mod: 26,,, | Performed by: PATHOLOGY

## 2019-01-15 PROCEDURE — 76872 US TRANSRECTAL: CPT | Mod: S$GLB,,, | Performed by: UROLOGY

## 2019-01-15 PROCEDURE — 55700 PR BIOPSY OF PROSTATE,NEEDLE/PUNCH: CPT | Mod: S$GLB,,, | Performed by: UROLOGY

## 2019-01-15 PROCEDURE — 88305 TISSUE EXAM BY PATHOLOGIST: CPT | Mod: 59 | Performed by: PATHOLOGY

## 2019-01-15 PROCEDURE — 76942 ECHO GUIDE FOR BIOPSY: CPT | Mod: 59,S$GLB,, | Performed by: UROLOGY

## 2019-01-15 PROCEDURE — 76942 PR U/S GUIDANCE FOR NEEDLE GUIDANCE: ICD-10-PCS | Mod: 59,S$GLB,, | Performed by: UROLOGY

## 2019-01-15 NOTE — PATIENT INSTRUCTIONS

## 2019-01-15 NOTE — PROCEDURES
Procedures     Pre-procedure diagnosis:   1. Prostate cancer  2. PIRADS 4(1.3cm right base) prostate lesion on MRI      Post-procedure diagnosis: same    Procedure: Transrectal URONAV MRI fusion-ultrasound guided prostate biopsy    TRUS size: 106 cc    Complications: none    Blood loss: minimal    Surgeon: Moy Warner MD    Anesthesia: 10cc lidocaine jelly, 20cc 1% lidocaine for prostatic block    Procedure: The risks and benefits of the procedure were explained to the patient and consent was obtained.  The patient laid in the lateral decubitus position.  10cc of lidocaine jelly was used for local analgesia in the rectum.  The ultrasound probe was advanced into the rectum. The prostate was visualized.  There was was a minimal median lobe.  20cc of 1% lidocaine without epi was used for a prostatic nerve block.    At this point, a sweep of the prostate was performed from base to apex and the transverse plane using the ultrasound and URONAV platform.  Landmarks were then labeled with anterior, posterior, right, left, base and apex were labeled in the axial as well as sagittal planes.  Segmentation was then performed and manual adjustments were made as needed starting in the sagittal plane followed by the axial plane.  At this point, alignment of the ultrasound with MRI images was ensured using the toggle between ultrasound and MRI.      At this point elastic deformation was computed.  Our images were satisfactory.  At this point, we performed 4 biopsies of a target lesion.  Subsequent to that, a standard 12core biopsy was performed, 2 from the apex, mid and base from the right and left side.    The patient tolerated the procedure well and was discharged home.  We will call him when the results are available for review.  He will finish the course of antibiotics.

## 2019-01-24 DIAGNOSIS — E11.9 TYPE 2 DIABETES MELLITUS WITHOUT COMPLICATION: ICD-10-CM

## 2019-02-04 ENCOUNTER — TELEPHONE (OUTPATIENT)
Dept: UROLOGY | Facility: CLINIC | Age: 69
End: 2019-02-04

## 2019-02-04 DIAGNOSIS — C61 PROSTATE CANCER: Primary | ICD-10-CM

## 2019-05-21 RX ORDER — ATORVASTATIN CALCIUM 10 MG/1
10 TABLET, FILM COATED ORAL EVERY OTHER DAY
Qty: 45 TABLET | Refills: 3 | Status: SHIPPED | OUTPATIENT
Start: 2019-05-21 | End: 2020-05-22

## 2019-05-21 RX ORDER — LISINOPRIL 2.5 MG/1
2.5 TABLET ORAL DAILY
Qty: 90 TABLET | Refills: 3 | Status: SHIPPED | OUTPATIENT
Start: 2019-05-21 | End: 2020-05-22

## 2019-05-21 NOTE — TELEPHONE ENCOUNTER
----- Message from Milagro Kitchen sent at 5/21/2019  3:24 PM CDT -----  Contact: [phone]/ self   Type:  RX Refill Request    Who Called: Odilon Wilder  Refill or New Rx: Refill   RX Name and Strength: lisinopril (PRINIVIL,ZESTRIL) 2.5 MG tablet  How is the patient currently taking it? (ex. 1XDay): Take 1 tablet (2.5 mg total) by mouth once daily. For protection or kidneys   Is this a 30 day or 90 day RX: 90  Preferred Pharmacy with phone number: [facility]; [phone]  Local or Mail Order: Local  Ordering Provider: [facility]      Refill or New Rx: Refill  RX Name and Strength: atorvastatin (LIPITOR) 10 MG tablet  How is the patient currently taking it? (ex. 1XDay): Take 1 tablet (10 mg total) by mouth every other day.   Preferred Pharmacy with phone number: [facility]; [phone]  Local or Mail Order: Local  Ordering Provider: [facility]

## 2019-05-29 ENCOUNTER — OFFICE VISIT (OUTPATIENT)
Dept: FAMILY MEDICINE | Facility: CLINIC | Age: 69
End: 2019-05-29
Payer: MEDICARE

## 2019-05-29 ENCOUNTER — TELEPHONE (OUTPATIENT)
Dept: GASTROENTEROLOGY | Facility: CLINIC | Age: 69
End: 2019-05-29

## 2019-05-29 VITALS
SYSTOLIC BLOOD PRESSURE: 110 MMHG | HEIGHT: 70 IN | TEMPERATURE: 99 F | HEART RATE: 92 BPM | WEIGHT: 175.38 LBS | BODY MASS INDEX: 25.11 KG/M2 | OXYGEN SATURATION: 95 % | DIASTOLIC BLOOD PRESSURE: 72 MMHG

## 2019-05-29 DIAGNOSIS — Z00.00 ROUTINE HEALTH MAINTENANCE: Primary | ICD-10-CM

## 2019-05-29 DIAGNOSIS — C61 PROSTATE CANCER: ICD-10-CM

## 2019-05-29 DIAGNOSIS — Z12.11 COLON CANCER SCREENING: ICD-10-CM

## 2019-05-29 DIAGNOSIS — I10 ESSENTIAL HYPERTENSION: ICD-10-CM

## 2019-05-29 DIAGNOSIS — E11.9 TYPE 2 DIABETES MELLITUS WITHOUT COMPLICATION, WITHOUT LONG-TERM CURRENT USE OF INSULIN: ICD-10-CM

## 2019-05-29 DIAGNOSIS — E87.5 HYPERKALEMIA: ICD-10-CM

## 2019-05-29 PROCEDURE — 99499 RISK ADDL DX/OHS AUDIT: ICD-10-PCS | Mod: S$GLB,,, | Performed by: FAMILY MEDICINE

## 2019-05-29 PROCEDURE — 99499 UNLISTED E&M SERVICE: CPT | Mod: S$GLB,,, | Performed by: FAMILY MEDICINE

## 2019-05-29 PROCEDURE — 3078F PR MOST RECENT DIASTOLIC BLOOD PRESSURE < 80 MM HG: ICD-10-PCS | Mod: CPTII,S$GLB,, | Performed by: FAMILY MEDICINE

## 2019-05-29 PROCEDURE — 3074F PR MOST RECENT SYSTOLIC BLOOD PRESSURE < 130 MM HG: ICD-10-PCS | Mod: CPTII,S$GLB,, | Performed by: FAMILY MEDICINE

## 2019-05-29 PROCEDURE — 99213 OFFICE O/P EST LOW 20 MIN: CPT | Mod: S$GLB,,, | Performed by: FAMILY MEDICINE

## 2019-05-29 PROCEDURE — 3044F PR MOST RECENT HEMOGLOBIN A1C LEVEL <7.0%: ICD-10-PCS | Mod: CPTII,S$GLB,, | Performed by: FAMILY MEDICINE

## 2019-05-29 PROCEDURE — 3078F DIAST BP <80 MM HG: CPT | Mod: CPTII,S$GLB,, | Performed by: FAMILY MEDICINE

## 2019-05-29 PROCEDURE — 3074F SYST BP LT 130 MM HG: CPT | Mod: CPTII,S$GLB,, | Performed by: FAMILY MEDICINE

## 2019-05-29 PROCEDURE — 3044F HG A1C LEVEL LT 7.0%: CPT | Mod: CPTII,S$GLB,, | Performed by: FAMILY MEDICINE

## 2019-05-29 PROCEDURE — 99213 PR OFFICE/OUTPT VISIT, EST, LEVL III, 20-29 MIN: ICD-10-PCS | Mod: S$GLB,,, | Performed by: FAMILY MEDICINE

## 2019-05-29 RX ORDER — GLIMEPIRIDE 1 MG/1
1 TABLET ORAL
Qty: 90 TABLET | Refills: 3 | Status: SHIPPED | OUTPATIENT
Start: 2019-05-29 | End: 2019-09-13 | Stop reason: SDUPTHER

## 2019-05-29 RX ORDER — FLUTICASONE PROPIONATE 50 MCG
1 SPRAY, SUSPENSION (ML) NASAL DAILY
Qty: 1 BOTTLE | Refills: 2 | Status: SHIPPED | OUTPATIENT
Start: 2019-05-29

## 2019-05-29 NOTE — TELEPHONE ENCOUNTER
Referral was sent  to schedule an Colonoscopy, left an voicemail for patient to call the office back in regards to scheduling procedure.

## 2019-05-30 NOTE — PROGRESS NOTES
" Patient ID: Odilon Wilder is a 69 y.o. male.    Chief Complaint: Follow-up (cough)    HPI      Odilon Wilder is a 69 y.o. male. here for annual exam.   Patient with chronic dry cough-use of Claritin sparingly does not help.  Itchy watery eyes postnasal drip-nonproductive.  No wheezing chills nausea vomiting diarrhea.  Diabetes mellitus under good control-glucose in the low one 100s consistently.  Hypertension-controlled on current medications no problems        Review of Symptoms    Constitutional: Negative.    HENT: Negative.    Eyes: Negative.    Respiratory: Negative.    Cardiovascular: Negative.    Gastrointestinal: Negative.    Endocrine: Negative.    Genitourinary: Negative.    Musculoskeletal: Negative.    Skin: Negative.    Allergic/Immunologic: Negative.    Neurological: Negative.    Hematological: Negative.    Psychiatric/Behavioral: Negative.      Except as above in HPI      /72 (BP Location: Left arm, Patient Position: Sitting)   Pulse 92   Temp 98.5 °F (36.9 °C) (Oral)   Ht 5' 10" (1.778 m)   Wt 79.5 kg (175 lb 6 oz)   SpO2 95%   BMI 25.16 kg/m²     Physical  Exam    Constitutional:  Oriented to person, place, and time. Appears well-developed and well-nourished.     HENT:   Head: Normocephalic and atraumatic.     Right Ear: Tympanic membrane, ear canal and External ear normal     Left Ear: Tympanic membrane, ear canal and External ear normal     Nose: Nose normal. No rhinorrhea or nasal deformity.     Mouth/Throat: Uvula is midline, oropharynx is clear and moist and mucous membranes are normal.      Eyes: Conjunctivae are normal. Right eye exhibits no discharge. Left eye exhibits no discharge. No scleral icterus.     Neck:  No JVD present. No tracheal deviation  [x]  Neck supple.   [x]  No Carotid bruit    Cardiovascular:  Regular rate and rhythm with normal S1 and S2     Pulmonary/Chest:   Clear to auscultation bilaterally without wheezes, rhonchi or rales    Musculoskeletal: Normal " range of motion. No edema or tenderness.   No deformity     Lymphadenopathy:  No cervical adenopathy.     Neurological:  Alert and oriented to person, place, and time. Coordination normal.     Skin: Skin is warm and dry. No rash noted.   Ft exam done-normal except for two small calluses one on left plantar surface lateral side one on the right  Psychiatric: Normal mood and affect. Speech is normal and behavior is normal. Judgment and thought content normal.     Complete Blood Count  Lab Results   Component Value Date    RBC 4.74 04/09/2018    HGB 14.8 04/09/2018    HCT 42.4 04/09/2018    MCV 90 04/09/2018    MCH 31.2 (H) 04/09/2018    MCHC 34.9 04/09/2018    RDW 11.7 04/09/2018     04/09/2018    MPV 9.8 04/09/2018    GRAN 6.0 04/09/2018    GRAN 70.3 04/09/2018    LYMPH 1.6 04/09/2018    LYMPH 18.3 04/09/2018    MONO 0.7 04/09/2018    MONO 8.6 04/09/2018    EOS 0.2 04/09/2018    BASO 0.02 04/09/2018    EOSINOPHIL 2.4 04/09/2018    BASOPHIL 0.2 04/09/2018    DIFFMETHOD Automated 04/09/2018       Comprehensive Metabolic Panel  Lab Results   Component Value Date     (H) 11/30/2018    BUN 23 (H) 11/30/2018    CREATININE 1.03 11/30/2018     11/30/2018    K 5.0 11/30/2018    CL 98 11/30/2018    CO2 32 (H) 11/30/2018    ANIONGAP 7 (L) 11/30/2018    EGFRNONAA >60.0 11/30/2018    ESTGFRAFRICA >60.0 11/30/2018       TSH  No results found for: TSH    Assessment / Plan:      ICD-10-CM ICD-9-CM   1. Routine health maintenance Z00.00 V70.0   2. Colon cancer screening Z12.11 V76.51   3. Essential hypertension I10 401.9   4. Type 2 diabetes mellitus without complication, without long-term current use of insulin E11.9 250.00   5. Hyperkalemia E87.5 276.7   6. Prostate cancer C61 185     Routine health maintenance  -     Comprehensive metabolic panel; Future  -     CBC auto differential; Future  -     Lipid panel; Future  -     TSH; Future  -     Hemoglobin A1c; Future    Colon cancer screening  -     Case request  GI: COLONOSCOPY  -     Case request GI: COLONOSCOPY    Essential hypertension  -     Comprehensive metabolic panel; Future  -     CBC auto differential; Future  -     Lipid panel; Future  -     TSH; Future  -     Hemoglobin A1c; Future    Type 2 diabetes mellitus without complication, without long-term current use of insulin  -     Comprehensive metabolic panel; Future  -     CBC auto differential; Future  -     Lipid panel; Future  -     TSH; Future  -     Hemoglobin A1c; Future    Hyperkalemia  -     Comprehensive metabolic panel; Future  -     CBC auto differential; Future  -     Lipid panel; Future  -     TSH; Future  -     Hemoglobin A1c; Future    Prostate cancer    Other orders  -     fluticasone propionate (FLONASE) 50 mcg/actuation nasal spray; 1 spray (50 mcg total) by Each Nare route once daily.  Dispense: 1 Bottle; Refill: 2  -     glimepiride (AMARYL) 1 MG tablet; Take 1 tablet (1 mg total) by mouth before breakfast.  Dispense: 90 tablet; Refill: 3          Discussed how to stay healthy including: diet, exercise, refraining from smoking and discussed screening exams / tests needed for age, sex and family Hx.

## 2019-06-03 ENCOUNTER — TELEPHONE (OUTPATIENT)
Dept: GASTROENTEROLOGY | Facility: CLINIC | Age: 69
End: 2019-06-03

## 2019-08-09 RX ORDER — METFORMIN HYDROCHLORIDE 500 MG/1
500 TABLET ORAL 2 TIMES DAILY WITH MEALS
Qty: 60 TABLET | Refills: 0 | Status: SHIPPED | OUTPATIENT
Start: 2019-08-09 | End: 2019-09-13 | Stop reason: SDUPTHER

## 2019-08-09 NOTE — TELEPHONE ENCOUNTER
I sent in medication for one month work of metformin-lab work is due-please get this done and I will send in three month supply    Also due for eye exam

## 2019-08-09 NOTE — TELEPHONE ENCOUNTER
----- Message from Maryan Rees sent at 8/9/2019  3:39 PM CDT -----  Contact: [phone]/self  Patient only uses  Please send his metFORMIN (GLUCOPHAGE) 500 MG tablets to [facility] [address] [place] [address] [address]      He only uses [facility]

## 2019-08-09 NOTE — TELEPHONE ENCOUNTER
----- Message from Maryan Rees sent at 8/9/2019  8:05 AM CDT -----  Contact: [phone]/self  Type:  RX Refill Request    Who Called:    Refill or New Rx: New  RX Name and Strength: metFORMIN (GLUCOPHAGE) 500 MG tablet  How is the patient currently taking it? (ex. 1XDay): Take 1 tablet (500 mg total) by mouth 2 (two) times daily with meals. - Oral  Is this a 30 day or 90 day RX: 90  Preferred Pharmacy with phone number: [id number] [address] [facility] AT [facility] [place]  Local or Mail Order: local  Ordering Provider:   Would the patient rather a call back or a response via [facility]?  call  Best Call Back Number:   Additional Information:

## 2019-08-12 ENCOUNTER — TELEPHONE (OUTPATIENT)
Dept: FAMILY MEDICINE | Facility: CLINIC | Age: 69
End: 2019-08-12

## 2019-08-12 ENCOUNTER — LAB VISIT (OUTPATIENT)
Dept: LAB | Facility: HOSPITAL | Age: 69
End: 2019-08-12
Attending: FAMILY MEDICINE
Payer: MEDICARE

## 2019-08-12 DIAGNOSIS — E87.5 HYPERKALEMIA: ICD-10-CM

## 2019-08-12 DIAGNOSIS — E11.9 TYPE 2 DIABETES MELLITUS WITHOUT COMPLICATION, WITHOUT LONG-TERM CURRENT USE OF INSULIN: ICD-10-CM

## 2019-08-12 DIAGNOSIS — I10 ESSENTIAL HYPERTENSION: ICD-10-CM

## 2019-08-12 DIAGNOSIS — Z00.00 ROUTINE HEALTH MAINTENANCE: ICD-10-CM

## 2019-08-12 LAB
ALBUMIN SERPL BCP-MCNC: 4.5 G/DL (ref 3.5–5.2)
ALP SERPL-CCNC: 61 U/L (ref 38–126)
ALT SERPL W/O P-5'-P-CCNC: 15 U/L (ref 10–44)
ANION GAP SERPL CALC-SCNC: 5 MMOL/L (ref 8–16)
ANION GAP SERPL CALC-SCNC: 5 MMOL/L (ref 8–16)
AST SERPL-CCNC: 18 U/L (ref 15–46)
BASOPHILS # BLD AUTO: 0.01 K/UL (ref 0–0.2)
BASOPHILS NFR BLD: 0.2 % (ref 0–1.9)
BILIRUB SERPL-MCNC: 0.9 MG/DL (ref 0.1–1)
BUN SERPL-MCNC: 24 MG/DL (ref 2–20)
BUN SERPL-MCNC: 24 MG/DL (ref 2–20)
CALCIUM SERPL-MCNC: 10.2 MG/DL (ref 8.7–10.5)
CALCIUM SERPL-MCNC: 10.2 MG/DL (ref 8.7–10.5)
CHLORIDE SERPL-SCNC: 102 MMOL/L (ref 95–110)
CHLORIDE SERPL-SCNC: 102 MMOL/L (ref 95–110)
CHOLEST SERPL-MCNC: 147 MG/DL (ref 120–199)
CHOLEST/HDLC SERPL: 3.7 {RATIO} (ref 2–5)
CO2 SERPL-SCNC: 31 MMOL/L (ref 23–29)
CO2 SERPL-SCNC: 31 MMOL/L (ref 23–29)
CREAT SERPL-MCNC: 1.07 MG/DL (ref 0.5–1.4)
CREAT SERPL-MCNC: 1.07 MG/DL (ref 0.5–1.4)
DIFFERENTIAL METHOD: ABNORMAL
EOSINOPHIL # BLD AUTO: 0.2 K/UL (ref 0–0.5)
EOSINOPHIL NFR BLD: 3.2 % (ref 0–8)
ERYTHROCYTE [DISTWIDTH] IN BLOOD BY AUTOMATED COUNT: 11.7 % (ref 11.5–14.5)
EST. GFR  (AFRICAN AMERICAN): >60 ML/MIN/1.73 M^2
EST. GFR  (AFRICAN AMERICAN): >60 ML/MIN/1.73 M^2
EST. GFR  (NON AFRICAN AMERICAN): >60 ML/MIN/1.73 M^2
EST. GFR  (NON AFRICAN AMERICAN): >60 ML/MIN/1.73 M^2
ESTIMATED AVG GLUCOSE: 94 MG/DL (ref 68–131)
GLUCOSE SERPL-MCNC: 113 MG/DL (ref 70–110)
GLUCOSE SERPL-MCNC: 113 MG/DL (ref 70–110)
HBA1C MFR BLD HPLC: 4.9 % (ref 4–5.6)
HCT VFR BLD AUTO: 46.8 % (ref 40–54)
HDLC SERPL-MCNC: 40 MG/DL (ref 40–75)
HDLC SERPL: 27.2 % (ref 20–50)
HGB BLD-MCNC: 15.5 G/DL (ref 14–18)
LDLC SERPL CALC-MCNC: 84.4 MG/DL (ref 63–159)
LYMPHOCYTES # BLD AUTO: 2 K/UL (ref 1–4.8)
LYMPHOCYTES NFR BLD: 31.4 % (ref 18–48)
MCH RBC QN AUTO: 31.5 PG (ref 27–31)
MCHC RBC AUTO-ENTMCNC: 33.1 G/DL (ref 32–36)
MCV RBC AUTO: 95 FL (ref 82–98)
MONOCYTES # BLD AUTO: 0.5 K/UL (ref 0.3–1)
MONOCYTES NFR BLD: 8.2 % (ref 4–15)
NEUTROPHILS # BLD AUTO: 3.7 K/UL (ref 1.8–7.7)
NEUTROPHILS NFR BLD: 57 % (ref 38–73)
NONHDLC SERPL-MCNC: 107 MG/DL
PLATELET # BLD AUTO: 219 K/UL (ref 150–350)
PMV BLD AUTO: 10.1 FL (ref 9.2–12.9)
POTASSIUM SERPL-SCNC: 5.3 MMOL/L (ref 3.5–5.1)
POTASSIUM SERPL-SCNC: 5.3 MMOL/L (ref 3.5–5.1)
PROT SERPL-MCNC: 7.9 G/DL (ref 6–8.4)
RBC # BLD AUTO: 4.92 M/UL (ref 4.6–6.2)
SODIUM SERPL-SCNC: 138 MMOL/L (ref 136–145)
SODIUM SERPL-SCNC: 138 MMOL/L (ref 136–145)
TRIGL SERPL-MCNC: 113 MG/DL (ref 30–150)
TSH SERPL DL<=0.005 MIU/L-ACNC: 0.92 UIU/ML (ref 0.4–4)
WBC # BLD AUTO: 6.5 K/UL (ref 3.9–12.7)

## 2019-08-12 PROCEDURE — 80053 COMPREHEN METABOLIC PANEL: CPT | Mod: PO

## 2019-08-12 PROCEDURE — 80061 LIPID PANEL: CPT

## 2019-08-12 PROCEDURE — 36415 COLL VENOUS BLD VENIPUNCTURE: CPT | Mod: PO

## 2019-08-12 PROCEDURE — 83036 HEMOGLOBIN GLYCOSYLATED A1C: CPT

## 2019-08-12 PROCEDURE — 84443 ASSAY THYROID STIM HORMONE: CPT | Mod: PO

## 2019-08-12 PROCEDURE — 85025 COMPLETE CBC W/AUTO DIFF WBC: CPT | Mod: PO

## 2019-08-13 NOTE — TELEPHONE ENCOUNTER
Your lab work is normal-continue your current medication and lifestyle    Your hemoglobin A1c dropped tremendously.  You are doing an excellent job

## 2019-08-16 DIAGNOSIS — E11.9 TYPE 2 DIABETES MELLITUS WITHOUT COMPLICATION, UNSPECIFIED WHETHER LONG TERM INSULIN USE: ICD-10-CM

## 2019-08-21 ENCOUNTER — OFFICE VISIT (OUTPATIENT)
Dept: UROLOGY | Facility: CLINIC | Age: 69
End: 2019-08-21
Payer: MEDICARE

## 2019-08-21 ENCOUNTER — LAB VISIT (OUTPATIENT)
Dept: LAB | Facility: HOSPITAL | Age: 69
End: 2019-08-21
Attending: UROLOGY
Payer: MEDICARE

## 2019-08-21 VITALS
SYSTOLIC BLOOD PRESSURE: 149 MMHG | HEART RATE: 79 BPM | DIASTOLIC BLOOD PRESSURE: 72 MMHG | HEIGHT: 70 IN | WEIGHT: 175.69 LBS | BODY MASS INDEX: 25.15 KG/M2

## 2019-08-21 DIAGNOSIS — C61 PROSTATE CANCER: ICD-10-CM

## 2019-08-21 DIAGNOSIS — C61 PROSTATE CANCER: Primary | ICD-10-CM

## 2019-08-21 DIAGNOSIS — I10 ESSENTIAL HYPERTENSION: ICD-10-CM

## 2019-08-21 DIAGNOSIS — N13.8 BPH WITH URINARY OBSTRUCTION: ICD-10-CM

## 2019-08-21 DIAGNOSIS — N40.1 BPH WITH URINARY OBSTRUCTION: ICD-10-CM

## 2019-08-21 LAB — COMPLEXED PSA SERPL-MCNC: 11.6 NG/ML (ref 0–4)

## 2019-08-21 PROCEDURE — 1101F PR PT FALLS ASSESS DOC 0-1 FALLS W/OUT INJ PAST YR: ICD-10-PCS | Mod: CPTII,S$GLB,, | Performed by: UROLOGY

## 2019-08-21 PROCEDURE — 1101F PT FALLS ASSESS-DOCD LE1/YR: CPT | Mod: CPTII,S$GLB,, | Performed by: UROLOGY

## 2019-08-21 PROCEDURE — 3078F PR MOST RECENT DIASTOLIC BLOOD PRESSURE < 80 MM HG: ICD-10-PCS | Mod: CPTII,S$GLB,, | Performed by: UROLOGY

## 2019-08-21 PROCEDURE — 3078F DIAST BP <80 MM HG: CPT | Mod: CPTII,S$GLB,, | Performed by: UROLOGY

## 2019-08-21 PROCEDURE — 99499 UNLISTED E&M SERVICE: CPT | Mod: S$GLB,,, | Performed by: UROLOGY

## 2019-08-21 PROCEDURE — 99999 PR PBB SHADOW E&M-EST. PATIENT-LVL III: CPT | Mod: PBBFAC,,, | Performed by: UROLOGY

## 2019-08-21 PROCEDURE — 99499 RISK ADDL DX/OHS AUDIT: ICD-10-PCS | Mod: S$GLB,,, | Performed by: UROLOGY

## 2019-08-21 PROCEDURE — 84153 ASSAY OF PSA TOTAL: CPT

## 2019-08-21 PROCEDURE — 99214 OFFICE O/P EST MOD 30 MIN: CPT | Mod: S$GLB,,, | Performed by: UROLOGY

## 2019-08-21 PROCEDURE — 3077F PR MOST RECENT SYSTOLIC BLOOD PRESSURE >= 140 MM HG: ICD-10-PCS | Mod: CPTII,S$GLB,, | Performed by: UROLOGY

## 2019-08-21 PROCEDURE — 3077F SYST BP >= 140 MM HG: CPT | Mod: CPTII,S$GLB,, | Performed by: UROLOGY

## 2019-08-21 PROCEDURE — 99214 PR OFFICE/OUTPT VISIT, EST, LEVL IV, 30-39 MIN: ICD-10-PCS | Mod: S$GLB,,, | Performed by: UROLOGY

## 2019-08-21 PROCEDURE — 99999 PR PBB SHADOW E&M-EST. PATIENT-LVL III: ICD-10-PCS | Mod: PBBFAC,,, | Performed by: UROLOGY

## 2019-08-21 PROCEDURE — 36415 COLL VENOUS BLD VENIPUNCTURE: CPT

## 2019-08-21 NOTE — PROGRESS NOTES
Subjective:       Patient ID: Odilon Wilder is a 69 y.o. male.    Chief Complaint:  Prostate Cancer (6 month follow up with a history of prostate cancer. last psa was 01/18/201911.5, pt voice no urology issue at this vist on today.)      History of Present Illness  HPI  Patient is a 69 y.o. male who is established to our clinic and was initially referred by their PCP, Dr. Stapleton for evaluation of elevated psa.    Patient underwent a TRUS prostate biopsy on 7/17/18.  TRUS volume was 140cc.  His pathology showed [place] 3+3 prostate cancer.  He then underwent an MRI of the prostate.  MRI volume--137cc. PIRADS 4 lesion, 1.3cm right apex.  URONAV biopsy again with asa 3+3.      he feels well.  He returns today for PSA review.  However, he had labs done last week but his PSA was not performed.    He denies any bone pain or unexpected weight loss.  He is voiding well without complaints.    Lab Results   Component Value Date    PSADIAG 11.6 (H) 08/21/2019    PSADIAG 11.5 (H) 01/08/2019    PSADIAG 10.3 (H) 06/19/2018    PSADIAG 12.5 (H) 05/14/2018         PATHOLOGY:   SPECIMEN  1) Prostate, left apex.  2) Prostate, left middle.  3) Prostate, left base.  4) Prostate, right apex.  5) Prostate, right middle.  6) Prostate, right base.  FINAL PATHOLOGIC DIAGNOSIS  1. PROSTATE, LEFT APEX, BIOPSY:  -Atypical small acinar proliferation (ASAP).  -Immunohistochemical stains for AMACR, P63 and HMWK were performed with adequate controls however focus  of interest was not present on deeper sections.  2. PROSTATE, LEFT MID, BIOPSY:  -Benign prostatic tissue with partial atrophy.  -Immunohistochemical stains for AMACR, P63 and HMWK were performed with adequate controls and support the  diagnosis.  3. PROSTATE, LEFT BASE, BIOPSY:  -Benign prostatic tissue with adenosis.  -Immunohistochemical stains for AMACR, P63 and HMWK were performed with adequate controls and support the  diagnosis.  4. PROSTATE, RIGHT APEX,  BIOPSY:  -Minute focus of prostatic adenocarcinoma, Sumpter score 3+3=6.  -Tumor occupies 1% of the tissue submitted (1 Of 2 cores involved).  -Immunohistochemical stains for AMACR, P63 and HMWK were performed with adequate controls and support the  diagnosis.  5. PROSTATE, RIGHT MID, BIOPSY:  -Benign prostatic tissue.  6. PROSTATE, RIGHT BASE, BIOPSY:  -Prostatic adenocarcinoma, Sumpter score 3+3=6.  -Tumor occupies 20% of the tissue submitted (1 of 2 cores involved).    Review of Systems  Review of Systems  All other systems reviewed and negative except pertinent positives noted in HPI.       Objective:     Physical Exam   Constitutional: He is oriented to person, place, and time. He appears well-developed and well-nourished. No distress.   HENT:   Head: Normocephalic and atraumatic.   Eyes: No scleral icterus.   Neck: No tracheal deviation present.   Pulmonary/Chest: Effort normal. No respiratory distress.   Neurological: He is alert and oriented to person, place, and time.   Psychiatric: He has a normal mood and affect. His behavior is normal. Judgment and thought content normal.       Lab Review  Lab Results   Component Value Date    COLORU Yellow 07/24/2018    SPECGRAV 1.025 07/24/2018    PHUR 6.0 07/24/2018    WBCUR n 07/24/2018    NITRITE Negative 07/24/2018    PROTEINUR n 07/24/2018    GLUCOSEUR n 07/24/2018    KETONESU Negative 07/24/2018    UROBILINOGEN 1.0 07/24/2018    BILIRUBINUR n 07/24/2018    RBCUR 3+ 07/24/2018         Assessment:        1. Prostate cancer    2. BPH with urinary obstruction    3. Essential hypertension            Plan:     Prostate cancer    BPH with urinary obstruction    Essential hypertension      -recommend   Continued active surveillance.  We will plan for a PSA in 6 months.  --BP reviewed today and elevated  -continue current medications  -encouraged f/u and discussion of BP with PCP.     -continue tamsulosin--no refills needed per the patient.

## 2019-08-22 ENCOUNTER — RESEARCH ENCOUNTER (OUTPATIENT)
Dept: RESEARCH | Facility: HOSPITAL | Age: 69
End: 2019-08-22

## 2019-08-22 NOTE — PROGRESS NOTES
Protocol: [id number], Testing Decision Aids to Improve Prostate Cancer Decisions for Minority Men  Sponsor: [place]  IRB # [id number]  Treating Investigator: Dr. AMANDA Warner  Patient Initials: H, BL     Progress Note     Completed patient H, BL 12-month follow-up for above-mentioned protocol.     Patient most recent contact was 08/21/2019, AMANDA Warner (Urology) office visit.  His PSA (6 months after diagnosis) completed 01/8/2018 was 11.5. 12 months PSA 08/21/2019 was 11.60    The patient completed the (12 month) protocol assessment. Patient returned booklet in the self-addressed envelope at doctors visit.    Also completed End Study form. Primary reason- protocol-defined follow-up completed.

## 2019-08-29 ENCOUNTER — OFFICE VISIT (OUTPATIENT)
Dept: FAMILY MEDICINE | Facility: CLINIC | Age: 69
End: 2019-08-29
Payer: MEDICARE

## 2019-08-29 VITALS
SYSTOLIC BLOOD PRESSURE: 112 MMHG | DIASTOLIC BLOOD PRESSURE: 60 MMHG | BODY MASS INDEX: 25.15 KG/M2 | OXYGEN SATURATION: 96 % | WEIGHT: 175.69 LBS | HEART RATE: 103 BPM | TEMPERATURE: 99 F | HEIGHT: 70 IN

## 2019-08-29 DIAGNOSIS — E11.9 DIABETIC EYE EXAM: ICD-10-CM

## 2019-08-29 DIAGNOSIS — Z01.00 DIABETIC EYE EXAM: ICD-10-CM

## 2019-08-29 DIAGNOSIS — M25.511 ACUTE PAIN OF RIGHT SHOULDER: Primary | ICD-10-CM

## 2019-08-29 PROCEDURE — 1101F PR PT FALLS ASSESS DOC 0-1 FALLS W/OUT INJ PAST YR: ICD-10-PCS | Mod: CPTII,S$GLB,, | Performed by: FAMILY MEDICINE

## 2019-08-29 PROCEDURE — 1101F PT FALLS ASSESS-DOCD LE1/YR: CPT | Mod: CPTII,S$GLB,, | Performed by: FAMILY MEDICINE

## 2019-08-29 PROCEDURE — 99213 PR OFFICE/OUTPT VISIT, EST, LEVL III, 20-29 MIN: ICD-10-PCS | Mod: S$GLB,,, | Performed by: FAMILY MEDICINE

## 2019-08-29 PROCEDURE — 3078F PR MOST RECENT DIASTOLIC BLOOD PRESSURE < 80 MM HG: ICD-10-PCS | Mod: CPTII,S$GLB,, | Performed by: FAMILY MEDICINE

## 2019-08-29 PROCEDURE — 3044F PR MOST RECENT HEMOGLOBIN A1C LEVEL <7.0%: ICD-10-PCS | Mod: CPTII,S$GLB,, | Performed by: FAMILY MEDICINE

## 2019-08-29 PROCEDURE — 3074F PR MOST RECENT SYSTOLIC BLOOD PRESSURE < 130 MM HG: ICD-10-PCS | Mod: CPTII,S$GLB,, | Performed by: FAMILY MEDICINE

## 2019-08-29 PROCEDURE — 3078F DIAST BP <80 MM HG: CPT | Mod: CPTII,S$GLB,, | Performed by: FAMILY MEDICINE

## 2019-08-29 PROCEDURE — 3044F HG A1C LEVEL LT 7.0%: CPT | Mod: CPTII,S$GLB,, | Performed by: FAMILY MEDICINE

## 2019-08-29 PROCEDURE — 3074F SYST BP LT 130 MM HG: CPT | Mod: CPTII,S$GLB,, | Performed by: FAMILY MEDICINE

## 2019-08-29 PROCEDURE — 99213 OFFICE O/P EST LOW 20 MIN: CPT | Mod: S$GLB,,, | Performed by: FAMILY MEDICINE

## 2019-08-29 NOTE — PROGRESS NOTES
" Patient ID: Odilon Wilder is a 69 y.o. male.    Chief Complaint: Shoulder Pain    HPI      Odilon Wilder is a 69 y.o. male pain ange at night in rt shoulder.  No trauma  goes back and forth form wrist.points to biceps. Does take meds Otc.  Motrin did help at night.  Also rubs and heat helps        Vitals:    08/29/19 0948   BP: 112/60   Pulse: 103   Temp: 98.5 °F (36.9 °C)   SpO2: 96%   Weight: 79.7 kg (175 lb 11.3 oz)   Height: 5' 10" (1.778 m)            Review of Symptoms    Constitutional  Neg activity change, No chills /fever   Resp  Neg hemoptysis, stridor, choking  CVS  Neg chest pain, palpitations    Physical Exam    Constitutional:  Oriented to person, place, and time.appears well-developed and well-nourished.  No distress.      HENT  Head: Normocephalic and atraumatic  Right Ear: External ear normal.   Left Ear: External ear normal.   Nose: External nose normal.   Mouth:  Moist mucus membranes.    Eyes:  Conjunctivae are normal. Right eye exhibits no discharge.  Left eye exhibits no discharge. No scleral icterus.  No periorbital edema    Cardiovascular:  Regular rate and rhythm with normal S1 and S2     Pulmonary/Chest:   Clear to auscultation bilaterally without wheezes, rhonchi or rales      Musculoskeletal:  No edema. No obvious deformity No wasting  Examination of shoulder-full active and passive range of motion-no laxity in any of the rotator cuff muscles or pain or tenderness with that testing.     Neurological:  Alert and oriented to person, place, and time.   Coordination normal.     Skin:   Skin is warm and dry.  No diaphoresis.   No rash noted.     Psychiatric: Normal mood and affect. Behavior is normal.  Judgment and thought content normal.     Complete Blood Count  Lab Results   Component Value Date    RBC 4.92 08/12/2019    HGB 15.5 08/12/2019    HCT 46.8 08/12/2019    MCV 95 08/12/2019    MCH 31.5 (H) 08/12/2019    MCHC 33.1 08/12/2019    RDW 11.7 08/12/2019     08/12/2019    MPV " 10.1 08/12/2019    GRAN 3.7 08/12/2019    GRAN 57.0 08/12/2019    LYMPH 2.0 08/12/2019    LYMPH 31.4 08/12/2019    MONO 0.5 08/12/2019    MONO 8.2 08/12/2019    EOS 0.2 08/12/2019    BASO 0.01 08/12/2019    EOSINOPHIL 3.2 08/12/2019    BASOPHIL 0.2 08/12/2019    DIFFMETHOD Automated 08/12/2019       Comprehensive Metabolic Panel  Lab Results   Component Value Date     (H) 08/12/2019     (H) 08/12/2019    BUN 24 (H) 08/12/2019    BUN 24 (H) 08/12/2019    CREATININE 1.07 08/12/2019    CREATININE 1.07 08/12/2019     08/12/2019     08/12/2019    K 5.3 (H) 08/12/2019    K 5.3 (H) 08/12/2019     08/12/2019     08/12/2019    PROT 7.9 08/12/2019    ALBUMIN 4.5 08/12/2019    BILITOT 0.9 08/12/2019    AST 18 08/12/2019    ALKPHOS 61 08/12/2019    CO2 31 (H) 08/12/2019    CO2 31 (H) 08/12/2019    ALT 15 08/12/2019    ANIONGAP 5 (L) 08/12/2019    ANIONGAP 5 (L) 08/12/2019    EGFRNONAA >60.0 08/12/2019    EGFRNONAA >60.0 08/12/2019    ESTGFRAFRICA >60.0 08/12/2019    ESTGFRAFRICA >60.0 08/12/2019       TSH  Lab Results   Component Value Date    TSH 0.924 08/12/2019       Assessment / Plan:      ICD-10-CM ICD-9-CM   1. Acute pain of right shoulder M25.511 719.41   2. Diabetic eye exam Z01.00 V72.0    E11.9 250.00     Acute pain of right shoulder    Diabetic eye exam  -     Ambulatory Referral to Ophthalmology    very little tobacco use   Does not need screening AAA    Discussed over-the-counter medications heat ice-

## 2019-09-13 ENCOUNTER — TELEPHONE (OUTPATIENT)
Dept: FAMILY MEDICINE | Facility: CLINIC | Age: 69
End: 2019-09-13

## 2019-09-13 RX ORDER — GLIMEPIRIDE 1 MG/1
1 TABLET ORAL
Qty: 90 TABLET | Refills: 3 | Status: SHIPPED | OUTPATIENT
Start: 2019-09-13 | End: 2020-11-17

## 2019-09-13 RX ORDER — METFORMIN HYDROCHLORIDE 500 MG/1
500 TABLET ORAL 2 TIMES DAILY WITH MEALS
Qty: 60 TABLET | Refills: 0 | Status: SHIPPED | OUTPATIENT
Start: 2019-09-13 | End: 2019-10-15 | Stop reason: SDUPTHER

## 2019-09-13 NOTE — TELEPHONE ENCOUNTER
----- Message from Etelvina Kruse sent at 9/13/2019  9:18 AM CDT -----  Contact: [phone]-self  Patient is requesting a callback concerning his metformin, states he needs a refill but cannot request it at the pharmacy because the dr was waiting for his test results. Please call patient for more info and advise.       Can you review his chart - I have not spoken with the pt - last a1c was 8/12/19  It looks like he is taking metformin 500 mg 1 BID  Do you want him to cont like this?  If so send in refill to pharmacy and then I will call and notify the pt   
no clubbing/no pedal edema/no cyanosis

## 2019-09-19 ENCOUNTER — OFFICE VISIT (OUTPATIENT)
Dept: OPTOMETRY | Facility: CLINIC | Age: 69
End: 2019-09-19
Payer: MEDICARE

## 2019-09-19 DIAGNOSIS — H52.4 PRESBYOPIA: ICD-10-CM

## 2019-09-19 DIAGNOSIS — Z13.5 GLAUCOMA SCREENING: ICD-10-CM

## 2019-09-19 DIAGNOSIS — H25.13 NUCLEAR SCLEROSIS, BILATERAL: ICD-10-CM

## 2019-09-19 DIAGNOSIS — E11.9 TYPE 2 DIABETES MELLITUS WITHOUT RETINOPATHY: Primary | ICD-10-CM

## 2019-09-19 PROCEDURE — 92015 PR REFRACTION: ICD-10-PCS | Mod: S$GLB,,, | Performed by: OPTOMETRIST

## 2019-09-19 PROCEDURE — 92004 COMPRE OPH EXAM NEW PT 1/>: CPT | Mod: S$GLB,,, | Performed by: OPTOMETRIST

## 2019-09-19 PROCEDURE — 92015 DETERMINE REFRACTIVE STATE: CPT | Mod: S$GLB,,, | Performed by: OPTOMETRIST

## 2019-09-19 PROCEDURE — 99499 UNLISTED E&M SERVICE: CPT | Mod: S$GLB,,, | Performed by: OPTOMETRIST

## 2019-09-19 PROCEDURE — 99499 RISK ADDL DX/OHS AUDIT: ICD-10-PCS | Mod: S$GLB,,, | Performed by: OPTOMETRIST

## 2019-09-19 PROCEDURE — 99999 PR PBB SHADOW E&M-EST. PATIENT-LVL II: CPT | Mod: PBBFAC,,, | Performed by: OPTOMETRIST

## 2019-09-19 PROCEDURE — 92004 PR EYE EXAM, NEW PATIENT,COMPREHESV: ICD-10-PCS | Mod: S$GLB,,, | Performed by: OPTOMETRIST

## 2019-09-19 PROCEDURE — 99999 PR PBB SHADOW E&M-EST. PATIENT-LVL II: ICD-10-PCS | Mod: PBBFAC,,, | Performed by: OPTOMETRIST

## 2019-09-19 NOTE — PROGRESS NOTES
HPI     NAOMI; 2018  Pt states no Va problems at distance-- wears OTC reader +3.00  No f/f  No gtts   No sx  LBS; didn't check today   Hemoglobin A1C       Date                     Value               Ref Range             Status                08/12/2019               4.9                 4.0 - 5.6 %           Final                    07/13/2018               6.0 (H)             4.0 - 5.6 %           Final                     05/14/2018               8.4 (H)             4.0 - 5.6 %           Final                   Last edited by Jimmy Yost, OD on 9/19/2019 10:06 AM. (History)        ROS     Positive for: Endocrine (DM)    Negative for: Constitutional, Gastrointestinal, Neurological, Skin,   Genitourinary, Musculoskeletal, HENT, Cardiovascular, Eyes, Respiratory,   Psychiatric, Allergic/Imm, Heme/Lymph    Last edited by Jimmy Yost, OD on 9/19/2019 10:06 AM. (History)        Assessment /Plan     For exam results, see Encounter Report.    Type 2 diabetes mellitus without retinopathy    Nuclear sclerosis, bilateral    Glaucoma screening    Presbyopia      1. Cat OU--pt happy w otc readers  2. DM- WITHOUT RETINOPATHY.  Advised yearly DFE    PLAN:    rtc 1 yr

## 2019-09-19 NOTE — LETTER
September 19, 2019      Ryley Porter MD  [address] [facility] [address]           [facility]  [address]  [place] [address]  Phone: [phone]  Fax: [phone]          Patient: Odilon Wilder   MR Number: [id number]   YOB: 1950   Date of Visit: 9/19/2019       Dear Dr. Ryley Porter:    Thank you for referring Odilon Wilder to me for evaluation. Attached you will find relevant portions of my assessment and plan of care.    If you have questions, please do not hesitate to call me. I look forward to following Odilon Wilder along with you.    Sincerely,    Jimmy Yost, OD    Enclosure  CC:  No Recipients    If you would like to receive this communication electronically, please contact [email or web address] or [phone] to request more information on [employer] Link access.    For providers and/or their staff who would like to refer a patient to [facility], please contact us through our one-stop-shop provider referral line, [facility], at [phone].    If you feel you have received this communication in error or would no longer like to receive these types of communications, please e-mail [email or web address]

## 2019-09-24 ENCOUNTER — TELEPHONE (OUTPATIENT)
Dept: GASTROENTEROLOGY | Facility: CLINIC | Age: 69
End: 2019-09-24

## 2019-10-15 RX ORDER — METFORMIN HYDROCHLORIDE 500 MG/1
TABLET ORAL
Qty: 60 TABLET | Refills: 0 | Status: SHIPPED | OUTPATIENT
Start: 2019-10-15 | End: 2019-11-16 | Stop reason: SDUPTHER

## 2019-11-16 RX ORDER — METFORMIN HYDROCHLORIDE 500 MG/1
TABLET ORAL
Qty: 60 TABLET | Refills: 0 | Status: SHIPPED | OUTPATIENT
Start: 2019-11-16 | End: 2019-12-17 | Stop reason: SDUPTHER

## 2019-12-17 RX ORDER — METFORMIN HYDROCHLORIDE 500 MG/1
TABLET ORAL
Qty: 60 TABLET | Refills: 0 | Status: SHIPPED | OUTPATIENT
Start: 2019-12-17 | End: 2020-01-20

## 2020-01-20 RX ORDER — METFORMIN HYDROCHLORIDE 500 MG/1
TABLET ORAL
Qty: 180 TABLET | Refills: 0 | Status: SHIPPED | OUTPATIENT
Start: 2020-01-20 | End: 2020-04-25

## 2020-02-17 ENCOUNTER — PATIENT OUTREACH (OUTPATIENT)
Dept: ADMINISTRATIVE | Facility: OTHER | Age: 70
End: 2020-02-17

## 2020-02-18 ENCOUNTER — LAB VISIT (OUTPATIENT)
Dept: LAB | Facility: HOSPITAL | Age: 70
End: 2020-02-18
Attending: UROLOGY
Payer: MEDICARE

## 2020-02-18 DIAGNOSIS — C61 PROSTATE CANCER: ICD-10-CM

## 2020-02-18 LAB — COMPLEXED PSA SERPL-MCNC: 10.7 NG/ML (ref 0–4)

## 2020-02-18 PROCEDURE — 84153 ASSAY OF PSA TOTAL: CPT

## 2020-02-18 PROCEDURE — 36415 COLL VENOUS BLD VENIPUNCTURE: CPT

## 2020-02-19 ENCOUNTER — OFFICE VISIT (OUTPATIENT)
Dept: UROLOGY | Facility: CLINIC | Age: 70
End: 2020-02-19
Payer: MEDICARE

## 2020-02-19 VITALS
BODY MASS INDEX: 24.61 KG/M2 | DIASTOLIC BLOOD PRESSURE: 78 MMHG | HEART RATE: 95 BPM | HEIGHT: 70 IN | SYSTOLIC BLOOD PRESSURE: 140 MMHG | WEIGHT: 171.94 LBS

## 2020-02-19 DIAGNOSIS — N40.1 BENIGN NON-NODULAR PROSTATIC HYPERPLASIA WITH LOWER URINARY TRACT SYMPTOMS: ICD-10-CM

## 2020-02-19 DIAGNOSIS — I10 ESSENTIAL HYPERTENSION: ICD-10-CM

## 2020-02-19 DIAGNOSIS — C61 PROSTATE CANCER: Primary | ICD-10-CM

## 2020-02-19 PROCEDURE — 99214 PR OFFICE/OUTPT VISIT, EST, LEVL IV, 30-39 MIN: ICD-10-PCS | Mod: S$GLB,,, | Performed by: UROLOGY

## 2020-02-19 PROCEDURE — 1159F PR MEDICATION LIST DOCUMENTED IN MEDICAL RECORD: ICD-10-PCS | Mod: S$GLB,,, | Performed by: UROLOGY

## 2020-02-19 PROCEDURE — 1159F MED LIST DOCD IN RCRD: CPT | Mod: S$GLB,,, | Performed by: UROLOGY

## 2020-02-19 PROCEDURE — 3078F DIAST BP <80 MM HG: CPT | Mod: CPTII,S$GLB,, | Performed by: UROLOGY

## 2020-02-19 PROCEDURE — 99999 PR PBB SHADOW E&M-EST. PATIENT-LVL III: CPT | Mod: PBBFAC,,, | Performed by: UROLOGY

## 2020-02-19 PROCEDURE — 1126F AMNT PAIN NOTED NONE PRSNT: CPT | Mod: S$GLB,,, | Performed by: UROLOGY

## 2020-02-19 PROCEDURE — 3077F SYST BP >= 140 MM HG: CPT | Mod: CPTII,S$GLB,, | Performed by: UROLOGY

## 2020-02-19 PROCEDURE — 1126F PR PAIN SEVERITY QUANTIFIED, NO PAIN PRESENT: ICD-10-PCS | Mod: S$GLB,,, | Performed by: UROLOGY

## 2020-02-19 PROCEDURE — 3077F PR MOST RECENT SYSTOLIC BLOOD PRESSURE >= 140 MM HG: ICD-10-PCS | Mod: CPTII,S$GLB,, | Performed by: UROLOGY

## 2020-02-19 PROCEDURE — 99214 OFFICE O/P EST MOD 30 MIN: CPT | Mod: S$GLB,,, | Performed by: UROLOGY

## 2020-02-19 PROCEDURE — 3078F PR MOST RECENT DIASTOLIC BLOOD PRESSURE < 80 MM HG: ICD-10-PCS | Mod: CPTII,S$GLB,, | Performed by: UROLOGY

## 2020-02-19 PROCEDURE — 99499 UNLISTED E&M SERVICE: CPT | Mod: S$GLB,,, | Performed by: UROLOGY

## 2020-02-19 PROCEDURE — 99499 RISK ADDL DX/OHS AUDIT: ICD-10-PCS | Mod: S$GLB,,, | Performed by: UROLOGY

## 2020-02-19 PROCEDURE — 1101F PR PT FALLS ASSESS DOC 0-1 FALLS W/OUT INJ PAST YR: ICD-10-PCS | Mod: CPTII,S$GLB,, | Performed by: UROLOGY

## 2020-02-19 PROCEDURE — 1101F PT FALLS ASSESS-DOCD LE1/YR: CPT | Mod: CPTII,S$GLB,, | Performed by: UROLOGY

## 2020-02-19 PROCEDURE — 99999 PR PBB SHADOW E&M-EST. PATIENT-LVL III: ICD-10-PCS | Mod: PBBFAC,,, | Performed by: UROLOGY

## 2020-02-19 NOTE — PROGRESS NOTES
Subjective:       Patient ID: Odilon Wilder is a 70 y.o. male.    Chief Complaint:  Prostate cancer      History of Present Illness  HPI  Patient is a 70 y.o. male who is established to our clinic and was initially referred by their PCP, Dr. Stapleton for evaluation of elevated psa.    Patient underwent a TRUS prostate biopsy on 7/17/18.  TRUS volume was 140cc.  His pathology showed [place] 3+3 prostate cancer.  He then underwent an MRI of the prostate.  MRI volume--137cc. PIRADS 4 lesion, 1.3cm right apex.  URONAV biopsy done 1/15/19 and again showed asa 3+3.      he feels well.  He returns today for PSA review.  PSA reviewed from 2/18/20 and was 10.7, which is lower than  It has been.     He denies any bone pain or unexpected weight loss.  He is voiding well without complaints.    Lab Results   Component Value Date    PSADIAG 10.7 (H) 02/18/2020    PSADIAG 11.6 (H) 08/21/2019    PSADIAG 11.5 (H) 01/08/2019    PSADIAG 10.3 (H) 06/19/2018    PSADIAG 12.5 (H) 05/14/2018         PATHOLOGY:   SPECIMEN  1) Prostate, left apex.  2) Prostate, left middle.  3) Prostate, left base.  4) Prostate, right apex.  5) Prostate, right middle.  6) Prostate, right base.  FINAL PATHOLOGIC DIAGNOSIS  1. PROSTATE, LEFT APEX, BIOPSY:  -Atypical small acinar proliferation (ASAP).  -Immunohistochemical stains for AMACR, P63 and HMWK were performed with adequate controls however focus  of interest was not present on deeper sections.  2. PROSTATE, LEFT MID, BIOPSY:  -Benign prostatic tissue with partial atrophy.  -Immunohistochemical stains for AMACR, P63 and HMWK were performed with adequate controls and support the  diagnosis.  3. PROSTATE, LEFT BASE, BIOPSY:  -Benign prostatic tissue with adenosis.  -Immunohistochemical stains for AMACR, P63 and HMWK were performed with adequate controls and support the  diagnosis.  4. PROSTATE, RIGHT APEX, BIOPSY:  -Minute focus of prostatic adenocarcinoma, Asa score 3+3=6.  -Tumor occupies  1% of the tissue submitted (1 Of 2 cores involved).  -Immunohistochemical stains for AMACR, P63 and HMWK were performed with adequate controls and support the  diagnosis.  5. PROSTATE, RIGHT MID, BIOPSY:  -Benign prostatic tissue.  6. PROSTATE, RIGHT BASE, BIOPSY:  -Prostatic adenocarcinoma, [place] score 3+3=6.  -Tumor occupies 20% of the tissue submitted (1 of 2 cores involved).    Review of Systems  Review of Systems  All other systems reviewed and negative except pertinent positives noted in HPI.       Objective:     Physical Exam   Constitutional: He is oriented to person, place, and time. He appears well-developed and well-nourished. No distress.   HENT:   Head: Normocephalic and atraumatic.   Eyes: No scleral icterus.   Neck: No tracheal deviation present.   Pulmonary/Chest: Effort normal. No respiratory distress.   Neurological: He is alert and oriented to person, place, and time.   Psychiatric: He has a normal mood and affect. His behavior is normal. Judgment and thought content normal.       Lab Review  Lab Results   Component Value Date    COLORU Yellow 07/24/2018    SPECGRAV 1.025 07/24/2018    PHUR 6.0 07/24/2018    WBCUR n 07/24/2018    NITRITE Negative 07/24/2018    PROTEINUR n 07/24/2018    GLUCOSEUR n 07/24/2018    KETONESU Negative 07/24/2018    UROBILINOGEN 1.0 07/24/2018    BILIRUBINUR n 07/24/2018    RBCUR 3+ 07/24/2018         Assessment:        1. Prostate cancer    2. Essential hypertension    3. Benign non-nodular prostatic hyperplasia with lower urinary tract symptoms            Plan:     Prostate cancer    Essential hypertension    Benign non-nodular prostatic hyperplasia with lower urinary tract symptoms    1. Prostate cancer:  -recommend  continued active surveillance.  We will plan for a PSA in 6 months.    2. BPH:   -patient on flomax  -symptoms stable.     3. HTN:  --BP reviewed today and elevated  -continue current medications  -encouraged f/u and discussion of BP with PCP.

## 2020-02-26 DIAGNOSIS — R33.9 URINARY RETENTION: ICD-10-CM

## 2020-02-26 DIAGNOSIS — N13.8 BPH WITH URINARY OBSTRUCTION: ICD-10-CM

## 2020-02-26 DIAGNOSIS — Z46.6 ENCOUNTER FOR FOLEY CATHETER REMOVAL: ICD-10-CM

## 2020-02-26 DIAGNOSIS — N40.1 BPH WITH URINARY OBSTRUCTION: ICD-10-CM

## 2020-02-26 RX ORDER — TAMSULOSIN HYDROCHLORIDE 0.4 MG/1
0.4 CAPSULE ORAL DAILY
Qty: 90 CAPSULE | Refills: 3 | Status: SHIPPED | OUTPATIENT
Start: 2020-02-26 | End: 2021-05-18

## 2020-04-25 RX ORDER — METFORMIN HYDROCHLORIDE 500 MG/1
TABLET ORAL
Qty: 180 TABLET | Refills: 0 | Status: SHIPPED | OUTPATIENT
Start: 2020-04-25 | End: 2020-07-29

## 2020-04-29 ENCOUNTER — PATIENT OUTREACH (OUTPATIENT)
Dept: ADMINISTRATIVE | Facility: HOSPITAL | Age: 70
End: 2020-04-29

## 2020-04-29 DIAGNOSIS — E11.9 TYPE 2 DIABETES MELLITUS WITHOUT COMPLICATION, WITHOUT LONG-TERM CURRENT USE OF INSULIN: Primary | ICD-10-CM

## 2020-05-13 ENCOUNTER — HOSPITAL ENCOUNTER (EMERGENCY)
Facility: HOSPITAL | Age: 70
Discharge: HOME | End: 2020-05-13
Attending: EMERGENCY MEDICINE
Payer: MEDICARE

## 2020-05-13 VITALS
OXYGEN SATURATION: 96 % | SYSTOLIC BLOOD PRESSURE: 152 MMHG | TEMPERATURE: 98 F | HEART RATE: 93 BPM | RESPIRATION RATE: 20 BRPM | HEIGHT: 70 IN | DIASTOLIC BLOOD PRESSURE: 86 MMHG | WEIGHT: 172 LBS | BODY MASS INDEX: 24.62 KG/M2

## 2020-05-13 DIAGNOSIS — S60.212A CONTUSION OF LEFT WRIST, INITIAL ENCOUNTER: Primary | ICD-10-CM

## 2020-05-13 PROCEDURE — 29125 APPL SHORT ARM SPLINT STATIC: CPT | Mod: ER

## 2020-05-13 PROCEDURE — 99283 EMERGENCY DEPT VISIT LOW MDM: CPT | Mod: 25,ER

## 2020-05-13 RX ORDER — MELOXICAM 7.5 MG/1
7.5 TABLET ORAL DAILY
Qty: 7 TABLET | Refills: 0 | Status: SHIPPED | OUTPATIENT
Start: 2020-05-13 | End: 2020-05-20

## 2020-05-14 ENCOUNTER — TELEPHONE (OUTPATIENT)
Dept: FAMILY MEDICINE | Facility: CLINIC | Age: 70
End: 2020-05-14

## 2020-05-14 NOTE — TELEPHONE ENCOUNTER
----- Message from Nikhil Delvalle sent at 5/14/2020 10:03 AM CDT -----  Contact: [phone]/self  [facility]     Name of Caller: PATIENT       Reason for Visit/Symptoms: ER FOLLOW UP FOR A FALL AND ARM PAIN  Best Contact Number or Confirm if Mychart Preferred: [phone]  Preferred Date/Time of Appointment: TODAY 5-14-20 OR TOMORROW 5-15-20  Interested in Virtual Visit: NO   Additional Information:NONE

## 2020-05-14 NOTE — TELEPHONE ENCOUNTER
I spoke with the pt and scheduled an appt for Monday for ER f/u for arm and fall    The pt declined a sooner appt with another provider

## 2020-05-15 NOTE — ED PROVIDER NOTES
Encounter Date: 5/13/2020       History     Chief Complaint   Patient presents with    Wrist Pain     L wrist pain s/p slamming it against hard object; mild swelling noted     Patient currently presents with a chief complaint of injury to the LEFT wrist.  This was acquired PTA as a result of banging the wrist on a nearby object.  Patient notes associated symptoms of pain and swelling.  Denies associated loss of ROM or sensation.        Review of patient's allergies indicates:   Allergen Reactions    Shellfish containing products      Past Medical History:   Diagnosis Date    High glucose     Urinary tract infection      Past Surgical History:   Procedure Laterality Date    knee      ORTHOPEDIC SURGERY       Family History   Problem Relation Age of Onset    Diabetes Mother     Cancer Father     Diabetes Sister     Diabetes Brother     Prostate cancer Neg Hx     Kidney disease Neg Hx      Social History     Tobacco Use    Smoking status: Former Smoker    Smokeless tobacco: Never Used    Tobacco comment: last time 40 cents a pack   Substance Use Topics    Alcohol use: Yes    Drug use: No     Review of Systems   Constitutional: Negative for chills and fever.   HENT: Negative for congestion.    Respiratory: Negative for chest tightness and shortness of breath.    Cardiovascular: Negative for chest pain and leg swelling.   Gastrointestinal: Negative for abdominal pain, constipation, diarrhea, nausea and vomiting.   Genitourinary: Negative for dysuria, frequency and urgency.   Skin: Negative for color change and rash.   Allergic/Immunologic: Negative for immunocompromised state.   Neurological: Negative for weakness and numbness.   Hematological: Negative for adenopathy. Does not bruise/bleed easily.   All other systems reviewed and are negative.      Physical Exam     Initial Vitals [05/13/20 1904]   BP Pulse Resp Temp SpO2   (!) 152/86 93 20 98.1 °F (36.7 °C) 96 %      MAP       --         Physical  Exam    Constitutional: He appears well-developed and well-nourished. He is not diaphoretic. No distress.   HENT:   Head: Normocephalic and atraumatic.   Cardiovascular: Normal rate, regular rhythm, normal heart sounds and intact distal pulses.   Pulmonary/Chest: Breath sounds normal. No respiratory distress.   Musculoskeletal:        Left wrist: He exhibits tenderness, bony tenderness and swelling. He exhibits normal range of motion, no deformity and no laceration.   Neurological: He is alert and oriented to person, place, and time.   Skin: Skin is warm and dry.   Psychiatric: He has a normal mood and affect. His behavior is normal.         ED Course   Procedures  Labs Reviewed - No data to display       Imaging Results          X-Ray Wrist Complete Left (Final result)  Result time 05/13/20 19:46:37    Final result by Giovani Hardy MD (05/13/20 19:46:37)                 Impression:      Negative for fracture.  Mild swelling.  Atherosclerosis.      Electronically signed by: Giovani Hardy MD  Date:    05/13/2020  Time:    19:46             Narrative:    EXAMINATION:  XR WRIST COMPLETE 3 VIEWS LEFT    CLINICAL HISTORY:  Unspecified injury of left wrist, hand and finger(s), initial encounter    FINDINGS:  There is no fracture, dislocation, or arthrosis.  There is atherosclerosis.  There is suggestion of mild wrist swelling.                                 Medical Decision Making:   ED Management:  All findings were reviewed with the patient/family in detail.  I see no indication of an emergent process beyond that addressed during our encounter but have duly counseled the patient/family regarding the need for prompt follow-up as well as the indications that should prompt immediate return to the emergency room should new or worrisome developments occur.  The patient has additionally been provided with printed information regarding diagnosis as well as instructions regarding follow up and any medications intended to  manage the patient's aforementioned conditions.  The patient/family communicates understanding of all this information and all remaining questions and concerns were addressed at this time.                                       Clinical Impression:       ICD-10-CM ICD-9-CM   1. Contusion of left wrist, initial encounter S60.212A 923.21             ED Disposition Condition    Discharge Stable        ED Prescriptions     Medication Sig Dispense Start Date End Date Auth. Provider    meloxicam (MOBIC) 7.5 MG tablet Take 1 tablet (7.5 mg total) by mouth once daily. for 7 days 7 tablet 5/13/2020 5/20/2020 Tarun Lucas MD        Follow-up Information     Follow up With Specialties Details Why Contact Info    Ryley Porter MD Family Medicine Schedule an appointment as soon as possible for a visit  for reassessment [address] [place] [address] [id number] [phone]                                       Tarun Lucas MD  05/15/20 1585

## 2020-05-18 ENCOUNTER — OFFICE VISIT (OUTPATIENT)
Dept: FAMILY MEDICINE | Facility: CLINIC | Age: 70
End: 2020-05-18
Payer: MEDICARE

## 2020-05-18 VITALS
HEIGHT: 70 IN | HEART RATE: 84 BPM | SYSTOLIC BLOOD PRESSURE: 142 MMHG | TEMPERATURE: 98 F | OXYGEN SATURATION: 96 % | DIASTOLIC BLOOD PRESSURE: 76 MMHG | WEIGHT: 178.13 LBS | BODY MASS INDEX: 25.5 KG/M2

## 2020-05-18 DIAGNOSIS — E11.9 TYPE 2 DIABETES MELLITUS WITHOUT COMPLICATION, UNSPECIFIED WHETHER LONG TERM INSULIN USE: ICD-10-CM

## 2020-05-18 DIAGNOSIS — E87.5 HYPERKALEMIA: ICD-10-CM

## 2020-05-18 DIAGNOSIS — Z12.11 ENCOUNTER FOR SCREENING FOR MALIGNANT NEOPLASM OF COLON: ICD-10-CM

## 2020-05-18 DIAGNOSIS — I10 ESSENTIAL HYPERTENSION: Primary | ICD-10-CM

## 2020-05-18 PROCEDURE — 1126F AMNT PAIN NOTED NONE PRSNT: CPT | Mod: S$GLB,,, | Performed by: FAMILY MEDICINE

## 2020-05-18 PROCEDURE — 3044F HG A1C LEVEL LT 7.0%: CPT | Mod: CPTII,S$GLB,, | Performed by: FAMILY MEDICINE

## 2020-05-18 PROCEDURE — 3077F SYST BP >= 140 MM HG: CPT | Mod: CPTII,S$GLB,, | Performed by: FAMILY MEDICINE

## 2020-05-18 PROCEDURE — 1159F PR MEDICATION LIST DOCUMENTED IN MEDICAL RECORD: ICD-10-PCS | Mod: S$GLB,,, | Performed by: FAMILY MEDICINE

## 2020-05-18 PROCEDURE — 1101F PR PT FALLS ASSESS DOC 0-1 FALLS W/OUT INJ PAST YR: ICD-10-PCS | Mod: CPTII,S$GLB,, | Performed by: FAMILY MEDICINE

## 2020-05-18 PROCEDURE — 3078F DIAST BP <80 MM HG: CPT | Mod: CPTII,S$GLB,, | Performed by: FAMILY MEDICINE

## 2020-05-18 PROCEDURE — 99499 UNLISTED E&M SERVICE: CPT | Mod: S$GLB,,, | Performed by: FAMILY MEDICINE

## 2020-05-18 PROCEDURE — 1101F PT FALLS ASSESS-DOCD LE1/YR: CPT | Mod: CPTII,S$GLB,, | Performed by: FAMILY MEDICINE

## 2020-05-18 PROCEDURE — 3044F PR MOST RECENT HEMOGLOBIN A1C LEVEL <7.0%: ICD-10-PCS | Mod: CPTII,S$GLB,, | Performed by: FAMILY MEDICINE

## 2020-05-18 PROCEDURE — 99499 RISK ADDL DX/OHS AUDIT: ICD-10-PCS | Mod: S$GLB,,, | Performed by: FAMILY MEDICINE

## 2020-05-18 PROCEDURE — 3078F PR MOST RECENT DIASTOLIC BLOOD PRESSURE < 80 MM HG: ICD-10-PCS | Mod: CPTII,S$GLB,, | Performed by: FAMILY MEDICINE

## 2020-05-18 PROCEDURE — 99214 OFFICE O/P EST MOD 30 MIN: CPT | Mod: S$GLB,,, | Performed by: FAMILY MEDICINE

## 2020-05-18 PROCEDURE — 1126F PR PAIN SEVERITY QUANTIFIED, NO PAIN PRESENT: ICD-10-PCS | Mod: S$GLB,,, | Performed by: FAMILY MEDICINE

## 2020-05-18 PROCEDURE — 99214 PR OFFICE/OUTPT VISIT, EST, LEVL IV, 30-39 MIN: ICD-10-PCS | Mod: S$GLB,,, | Performed by: FAMILY MEDICINE

## 2020-05-18 PROCEDURE — 3077F PR MOST RECENT SYSTOLIC BLOOD PRESSURE >= 140 MM HG: ICD-10-PCS | Mod: CPTII,S$GLB,, | Performed by: FAMILY MEDICINE

## 2020-05-18 PROCEDURE — 1159F MED LIST DOCD IN RCRD: CPT | Mod: S$GLB,,, | Performed by: FAMILY MEDICINE

## 2020-05-18 NOTE — PROGRESS NOTES
" Patient ID: Odilon Wilder is a 70 y.o. male.    Chief Complaint: Follow-up (ER follow up)    HPI      Odilon Wilder is a 70 y.o. male patient with fall and injury to left wrist.  X-rays taken with no fracture strain.  Placed in gutter splint-feeling better at this time.  Removed gutter splint not having problems.  Hypertension-not fully controlled   BPH-no new problems  Vitals:    05/18/20 1103   BP: (!) 142/76   BP Location: Left arm   Patient Position: Sitting   Pulse: 84   Temp: 97.7 °F (36.5 °C)   TempSrc: Oral   SpO2: 96%   Weight: 80.8 kg (178 lb 2.1 oz)   Height: 5' 10" (1.778 m)            Review of Symptoms    Constitutional  Neg activity change, No chills /fever   Resp  Neg hemoptysis, stridor, choking  CVS  Neg chest pain, palpitations    Physical Exam    Constitutional:  Oriented to person, place, and time.appears well-developed and well-nourished.  No distress.      HENT  Head: Normocephalic and atraumatic  Right Ear: External ear normal.   Left Ear: External ear normal.   Nose: External nose normal.   Mouth:  Moist mucus membranes.    Eyes:  Conjunctivae are normal. Right eye exhibits no discharge.  Left eye exhibits no discharge. No scleral icterus.  No periorbital edema    Cardiovascular:  Regular rate and rhythm with normal S1 and S2     Pulmonary/Chest:   Clear to auscultation bilaterally without wheezes, rhonchi or rales      Musculoskeletal:  No edema. No obvious deformity No wasting  Left wrist hand within normal limits-no tenderness full range of motion full strength     Neurological:  Alert and oriented to person, place, and time.   Coordination normal.     Skin:   Skin is warm and dry.  No diaphoresis.   No rash noted.     Psychiatric: Normal mood and affect. Behavior is normal.  Judgment and thought content normal.     Complete Blood Count  Lab Results   Component Value Date    RBC 4.92 08/12/2019    HGB 15.5 08/12/2019    HCT 46.8 08/12/2019    MCV 95 08/12/2019    MCH 31.5 (H) " 08/12/2019    MCHC 33.1 08/12/2019    RDW 11.7 08/12/2019     08/12/2019    MPV 10.1 08/12/2019    GRAN 3.7 08/12/2019    GRAN 57.0 08/12/2019    LYMPH 2.0 08/12/2019    LYMPH 31.4 08/12/2019    MONO 0.5 08/12/2019    MONO 8.2 08/12/2019    EOS 0.2 08/12/2019    BASO 0.01 08/12/2019    EOSINOPHIL 3.2 08/12/2019    BASOPHIL 0.2 08/12/2019    DIFFMETHOD Automated 08/12/2019       Comprehensive Metabolic Panel  No results found for: GLU, BUN, CREATININE, NA, K, CL, PROT, ALBUMIN, BILITOT, AST, ALKPHOS, CO2, ALT, ANIONGAP, EGFRNONAA, ESTGFRAFRICA    TSH  No results found for: TSH    Assessment / Plan:      ICD-10-CM ICD-9-CM   1. Essential hypertension I10 401.9   2. Encounter for screening for malignant neoplasm of colon Z12.11 V76.51   3. Type 2 diabetes mellitus without complication, unspecified whether long term insulin use E11.9 250.00   4. Hyperkalemia E87.5 276.7     Essential hypertension  -     Comprehensive metabolic panel; Future; Expected date: 05/18/2020  -     CBC auto differential; Future; Expected date: 05/18/2020  -     Lipid Panel; Future; Expected date: 05/18/2020  -     TSH; Future; Expected date: 05/18/2020  -     Hemoglobin A1C; Future; Expected date: 05/18/2020    Encounter for screening for malignant neoplasm of colon  -     Case request GI: COLONOSCOPY    Type 2 diabetes mellitus without complication, unspecified whether long term insulin use  -     Comprehensive metabolic panel; Future; Expected date: 05/18/2020  -     CBC auto differential; Future; Expected date: 05/18/2020  -     Lipid Panel; Future; Expected date: 05/18/2020  -     TSH; Future; Expected date: 05/18/2020  -     Hemoglobin A1C; Future; Expected date: 05/18/2020    Hyperkalemia  -     Comprehensive metabolic panel; Future; Expected date: 05/18/2020  -     CBC auto differential; Future; Expected date: 05/18/2020  -     Lipid Panel; Future; Expected date: 05/18/2020  -     TSH; Future; Expected date: 05/18/2020  -      Hemoglobin A1C; Future; Expected date: 05/18/2020     left wrist contusion-observe-heat and ice as needed-NSAIDs if needed

## 2020-05-22 RX ORDER — LISINOPRIL 2.5 MG/1
TABLET ORAL
Qty: 90 TABLET | Refills: 3 | Status: SHIPPED | OUTPATIENT
Start: 2020-05-22 | End: 2021-05-17

## 2020-05-22 RX ORDER — ATORVASTATIN CALCIUM 10 MG/1
TABLET, FILM COATED ORAL
Qty: 45 TABLET | Refills: 3 | Status: SHIPPED | OUTPATIENT
Start: 2020-05-22 | End: 2021-05-17

## 2020-05-25 ENCOUNTER — TELEPHONE (OUTPATIENT)
Dept: GASTROENTEROLOGY | Facility: CLINIC | Age: 70
End: 2020-05-25

## 2020-05-25 DIAGNOSIS — Z12.11 ENCOUNTER FOR SCREENING FOR MALIGNANT NEOPLASM OF COLON: Primary | ICD-10-CM

## 2020-05-25 NOTE — TELEPHONE ENCOUNTER
Spoke to patient about scheduling a screening colonoscopy. Patient asked if it would be done in [place]. Told patient that is correct. Patient states he is not coming to [place]. Told patient he can contact Dr. Stapleton's office to be referred to someone in his area.

## 2020-05-27 LAB
ALBUMIN SERPL-MCNC: 4.2 G/DL (ref 3.6–5.1)
ALBUMIN/GLOB SERPL: 2 (CALC) (ref 1–2.5)
ALP SERPL-CCNC: 75 U/L (ref 35–144)
ALT SERPL-CCNC: 12 U/L (ref 9–46)
AST SERPL-CCNC: 14 U/L (ref 10–35)
BASOPHILS # BLD AUTO: 17 CELLS/UL (ref 0–200)
BASOPHILS NFR BLD AUTO: 0.3 %
BILIRUB SERPL-MCNC: 0.7 MG/DL (ref 0.2–1.2)
BUN SERPL-MCNC: 14 MG/DL (ref 7–25)
BUN/CREAT SERPL: ABNORMAL (CALC) (ref 6–22)
CALCIUM SERPL-MCNC: 9.6 MG/DL (ref 8.6–10.3)
CHLORIDE SERPL-SCNC: 102 MMOL/L (ref 98–110)
CHOLEST SERPL-MCNC: 122 MG/DL
CHOLEST/HDLC SERPL: 3.4 (CALC)
CO2 SERPL-SCNC: 32 MMOL/L (ref 20–32)
CREAT SERPL-MCNC: 1.04 MG/DL (ref 0.7–1.18)
EOSINOPHIL # BLD AUTO: 319 CELLS/UL (ref 15–500)
EOSINOPHIL NFR BLD AUTO: 5.6 %
ERYTHROCYTE [DISTWIDTH] IN BLOOD BY AUTOMATED COUNT: 11.5 % (ref 11–15)
GFRSERPLBLD MDRD-ARVRAT: 72 ML/MIN/1.73M2
GLOBULIN SER CALC-MCNC: 2.1 G/DL (CALC) (ref 1.9–3.7)
GLUCOSE SERPL-MCNC: 145 MG/DL (ref 65–99)
HBA1C MFR BLD: 5 % OF TOTAL HGB
HCT VFR BLD AUTO: 43.5 % (ref 38.5–50)
HDLC SERPL-MCNC: 36 MG/DL
HGB BLD-MCNC: 14.2 G/DL (ref 13.2–17.1)
LDLC SERPL CALC-MCNC: 57 MG/DL (CALC)
LYMPHOCYTES # BLD AUTO: 1830 CELLS/UL (ref 850–3900)
LYMPHOCYTES NFR BLD AUTO: 32.1 %
MCH RBC QN AUTO: 31.5 PG (ref 27–33)
MCHC RBC AUTO-ENTMCNC: 32.6 G/DL (ref 32–36)
MCV RBC AUTO: 96.5 FL (ref 80–100)
MONOCYTES # BLD AUTO: 388 CELLS/UL (ref 200–950)
MONOCYTES NFR BLD AUTO: 6.8 %
NEUTROPHILS # BLD AUTO: 3146 CELLS/UL (ref 1500–7800)
NEUTROPHILS NFR BLD AUTO: 55.2 %
NONHDLC SERPL-MCNC: 86 MG/DL (CALC)
PLATELET # BLD AUTO: 217 THOUSAND/UL (ref 140–400)
PMV BLD REES-ECKER: 10.2 FL (ref 7.5–12.5)
POTASSIUM SERPL-SCNC: 4.2 MMOL/L (ref 3.5–5.3)
PROT SERPL-MCNC: 6.3 G/DL (ref 6.1–8.1)
RBC # BLD AUTO: 4.51 MILLION/UL (ref 4.2–5.8)
SODIUM SERPL-SCNC: 140 MMOL/L (ref 135–146)
TRIGL SERPL-MCNC: 219 MG/DL
TSH SERPL-ACNC: 0.9 MIU/L (ref 0.4–4.5)
WBC # BLD AUTO: 5.7 THOUSAND/UL (ref 3.8–10.8)

## 2020-05-29 ENCOUNTER — TELEPHONE (OUTPATIENT)
Dept: GASTROENTEROLOGY | Facility: CLINIC | Age: 70
End: 2020-05-29

## 2020-07-29 RX ORDER — METFORMIN HYDROCHLORIDE 500 MG/1
TABLET ORAL
Qty: 180 TABLET | Refills: 0 | Status: SHIPPED | OUTPATIENT
Start: 2020-07-29 | End: 2020-11-10

## 2020-08-05 ENCOUNTER — TELEPHONE (OUTPATIENT)
Dept: GASTROENTEROLOGY | Facility: CLINIC | Age: 70
End: 2020-08-05

## 2020-08-05 DIAGNOSIS — Z01.812 PRE-PROCEDURE LAB EXAM: ICD-10-CM

## 2020-08-05 RX ORDER — POLYETHYLENE GLYCOL 3350, SODIUM SULFATE ANHYDROUS, SODIUM BICARBONATE, SODIUM CHLORIDE, POTASSIUM CHLORIDE 236; 22.74; 6.74; 5.86; 2.97 G/4L; G/4L; G/4L; G/4L; G/4L
4 POWDER, FOR SOLUTION ORAL SEE ADMIN INSTRUCTIONS
Qty: 4000 ML | Refills: 0 | Status: SHIPPED | OUTPATIENT
Start: 2020-08-05 | End: 2020-10-12

## 2020-08-05 NOTE — TELEPHONE ENCOUNTER
Colonoscopy Referral    Reason for Referral: Screening.    Would this be your first Colonoscopy?  Previous colon evalations: No   None  When:   Where:   Hx of polyps: No  Hx of colon cancer: No    Family History of:   Colon polyp: No  Relationship/Age of Onset:   Colon cancer: No  Relationship/Age of Onset:     Patient with:   Have you had a FIT Test in the last year?: No  Iron deficient: No  On Blood Thinner: No  Lung disease: No  Kidney disease: No    Have you been admitted overnight to the hospital in the past 3 months? No  Have you had a cardiac stent placed in the past 12 months? No  Have you had a stroke or heart attack in the past 6 months? No  Have you had any chest pain in the past 3 months? No      If so, have you been evaluated by your PCP or Cardiologist? No    Have you been diagnosed with Diverticulitis within the past 3 months? No  Are you on dialysis? No    Are you diabetic?  Do you have an insulin pump? Yes  Do you have any other health issues that you feel might limit your ability to safely have the procedure and/or sedation? No  Is the patient over 81 yo? No     If yes, has the patient been seen by their PCP or GI in the last 6 months?        -I have reviewed the patient's medications and allergies.   A Medication /Cardiac Clearance request  has been sent to       -I have verified the pharmacy information. Axial prep has been reviewed with patient. Instructions were sent by Mail.        Patient was scheduled for colonoscopy on 8/24/20 with Dr. Sosa at [facility].        Covid test scheduled on 8/21/20 at 8:20am

## 2020-08-18 ENCOUNTER — LAB VISIT (OUTPATIENT)
Dept: LAB | Facility: HOSPITAL | Age: 70
End: 2020-08-18
Attending: UROLOGY
Payer: MEDICARE

## 2020-08-18 DIAGNOSIS — C61 PROSTATE CANCER: ICD-10-CM

## 2020-08-18 LAB — COMPLEXED PSA SERPL-MCNC: 19.2 NG/ML (ref 0–4)

## 2020-08-18 PROCEDURE — 36415 COLL VENOUS BLD VENIPUNCTURE: CPT | Mod: PO

## 2020-08-18 PROCEDURE — 84153 ASSAY OF PSA TOTAL: CPT

## 2020-08-20 ENCOUNTER — TELEPHONE (OUTPATIENT)
Dept: ENDOSCOPY | Facility: HOSPITAL | Age: 70
End: 2020-08-20

## 2020-08-20 NOTE — TELEPHONE ENCOUNTER
Left message instructing patient to call dept @ [phone] between 8am-4pm.    Arrival time to be given @ 1300  (Message sent via [facility] portal)

## 2020-08-21 ENCOUNTER — TELEPHONE (OUTPATIENT)
Dept: ENDOSCOPY | Facility: HOSPITAL | Age: 70
End: 2020-08-21

## 2020-08-21 NOTE — TELEPHONE ENCOUNTER
Left message instructing patient to call dept @ [phone] between 8am-4pm.    Arrival time to be given @  1pm (COLON)*  (Message sent via [facility] portal)

## 2020-08-24 ENCOUNTER — ANESTHESIA (OUTPATIENT)
Dept: ENDOSCOPY | Facility: HOSPITAL | Age: 70
End: 2020-08-24
Payer: MEDICARE

## 2020-08-24 ENCOUNTER — PATIENT OUTREACH (OUTPATIENT)
Dept: ADMINISTRATIVE | Facility: OTHER | Age: 70
End: 2020-08-24

## 2020-08-24 ENCOUNTER — HOSPITAL ENCOUNTER (OUTPATIENT)
Facility: HOSPITAL | Age: 70
Discharge: HOME | End: 2020-08-24
Attending: INTERNAL MEDICINE | Admitting: INTERNAL MEDICINE
Payer: MEDICARE

## 2020-08-24 ENCOUNTER — ANESTHESIA EVENT (OUTPATIENT)
Dept: ENDOSCOPY | Facility: HOSPITAL | Age: 70
End: 2020-08-24
Payer: MEDICARE

## 2020-08-24 VITALS
OXYGEN SATURATION: 100 % | BODY MASS INDEX: 24.22 KG/M2 | TEMPERATURE: 98 F | RESPIRATION RATE: 18 BRPM | WEIGHT: 173 LBS | HEART RATE: 77 BPM | SYSTOLIC BLOOD PRESSURE: 128 MMHG | DIASTOLIC BLOOD PRESSURE: 82 MMHG | HEIGHT: 71 IN

## 2020-08-24 DIAGNOSIS — Z12.11 COLON CANCER SCREENING: Primary | ICD-10-CM

## 2020-08-24 LAB
GLUCOSE SERPL-MCNC: 87 MG/DL (ref 70–110)
POCT GLUCOSE: 87 MG/DL (ref 70–110)
SARS-COV-2 RDRP RESP QL NAA+PROBE: NEGATIVE

## 2020-08-24 PROCEDURE — G0121 COLON CA SCRN NOT HI RSK IND: HCPCS | Performed by: INTERNAL MEDICINE

## 2020-08-24 PROCEDURE — 25000003 PHARM REV CODE 250: Performed by: INTERNAL MEDICINE

## 2020-08-24 PROCEDURE — G0121 COLON CA SCRN NOT HI RSK IND: ICD-10-PCS | Mod: ,,, | Performed by: INTERNAL MEDICINE

## 2020-08-24 PROCEDURE — 63600175 PHARM REV CODE 636 W HCPCS: Performed by: NURSE ANESTHETIST, CERTIFIED REGISTERED

## 2020-08-24 PROCEDURE — 37000009 HC ANESTHESIA EA ADD 15 MINS: Performed by: INTERNAL MEDICINE

## 2020-08-24 PROCEDURE — 25000003 PHARM REV CODE 250: Performed by: NURSE ANESTHETIST, CERTIFIED REGISTERED

## 2020-08-24 PROCEDURE — 82962 GLUCOSE BLOOD TEST: CPT | Performed by: INTERNAL MEDICINE

## 2020-08-24 PROCEDURE — U0002 COVID-19 LAB TEST NON-CDC: HCPCS

## 2020-08-24 PROCEDURE — G0121 COLON CA SCRN NOT HI RSK IND: HCPCS | Mod: ,,, | Performed by: INTERNAL MEDICINE

## 2020-08-24 PROCEDURE — 37000008 HC ANESTHESIA 1ST 15 MINUTES: Performed by: INTERNAL MEDICINE

## 2020-08-24 RX ORDER — SODIUM CHLORIDE 9 MG/ML
INJECTION, SOLUTION INTRAVENOUS CONTINUOUS
Status: DISCONTINUED | OUTPATIENT
Start: 2020-08-24 | End: 2020-08-24 | Stop reason: HOSPADM

## 2020-08-24 RX ORDER — PROPOFOL 10 MG/ML
VIAL (ML) INTRAVENOUS CONTINUOUS PRN
Status: DISCONTINUED | OUTPATIENT
Start: 2020-08-24 | End: 2020-08-24

## 2020-08-24 RX ORDER — LIDOCAINE HCL/PF 100 MG/5ML
SYRINGE (ML) INTRAVENOUS
Status: DISCONTINUED | OUTPATIENT
Start: 2020-08-24 | End: 2020-08-24

## 2020-08-24 RX ORDER — SODIUM CHLORIDE 0.9 % (FLUSH) 0.9 %
10 SYRINGE (ML) INJECTION
Status: DISCONTINUED | OUTPATIENT
Start: 2020-08-24 | End: 2020-08-24 | Stop reason: HOSPADM

## 2020-08-24 RX ORDER — PROPOFOL 10 MG/ML
VIAL (ML) INTRAVENOUS
Status: DISCONTINUED | OUTPATIENT
Start: 2020-08-24 | End: 2020-08-24

## 2020-08-24 RX ADMIN — LIDOCAINE HYDROCHLORIDE 40 MG: 20 INJECTION, SOLUTION INTRAVENOUS at 08:08

## 2020-08-24 RX ADMIN — PROPOFOL 100 MG: 10 INJECTION, EMULSION INTRAVENOUS at 08:08

## 2020-08-24 RX ADMIN — SODIUM CHLORIDE: 0.9 INJECTION, SOLUTION INTRAVENOUS at 08:08

## 2020-08-24 RX ADMIN — PROPOFOL 150 MCG/KG/MIN: 10 INJECTION, EMULSION INTRAVENOUS at 08:08

## 2020-08-24 NOTE — ANESTHESIA PREPROCEDURE EVALUATION
08/24/2020  Odilon Wilder is a 70 y.o., male for colonoscopy under MAC    Past Medical History:   Diagnosis Date    High glucose     Urinary tract infection      Past Surgical History:   Procedure Laterality Date    knee      ORTHOPEDIC SURGERY           Anesthesia Evaluation    I have reviewed the Patient Summary Reports.   I have reviewed the NPO Status.   I have reviewed the Medications.     Review of Systems  Social:  Former Smoker        Physical Exam  General:  Well nourished    Airway/Jaw/Neck:  Airway Findings: Mallampati: II      Chest/Lungs:  Chest/Lungs Clear    Heart/Vascular:  Heart Findings: Normal            Anesthesia Plan  Type of Anesthesia, risks & benefits discussed:  Anesthesia Type:  MAC  Patient's Preference:   Intra-op Monitoring Plan: standard ASA monitors  Intra-op Monitoring Plan Comments:   Post Op Pain Control Plan:   Post Op Pain Control Plan Comments:   Induction:    Beta Blocker:  Patient is not currently on a Beta-Blocker (No further documentation required).       Informed Consent: Patient understands risks and agrees with Anesthesia plan.  Questions answered. Anesthesia consent signed with patient.  ASA Score: 2     Day of Surgery Review of History & Physical:            Ready For Surgery From Anesthesia Perspective.

## 2020-08-24 NOTE — TRANSFER OF CARE
"Anesthesia Transfer of Care Note    Patient: Odilon Wilder    Procedure(s) Performed: Procedure(s) (LRB):  COLONOSCOPY Golytely (N/A)    Patient location: GI    Anesthesia Type: general    Transport from OR: Transported from OR on room air with adequate spontaneous ventilation    Post pain: adequate analgesia    Post assessment: no apparent anesthetic complications and tolerated procedure well    Post vital signs: stable    Level of consciousness: responds to stimulation and sedated    Nausea/Vomiting: no nausea/vomiting    Complications: none    Transfer of care protocol was followed      Last vitals:   Visit Vitals  BP (!) 159/83   Pulse 95   Temp 36.5 °C (97.7 °F) (Temporal)   Resp 18   Ht 5' 11" (1.803 m)   Wt 78.5 kg (173 lb)   SpO2 100%   BMI 24.13 kg/m²     "

## 2020-08-24 NOTE — ANESTHESIA RELEASE NOTE
"Anesthesia Release from PACU Note    Patient: Odilon Wilder    Procedure(s) Performed: Procedure(s) (LRB):  COLONOSCOPY Golytely (N/A)    Anesthesia type: general    Post pain: Adequate analgesia    Post assessment: no apparent anesthetic complications and tolerated procedure well    Last Vitals:   Visit Vitals  /62   Pulse 74   Temp 36.8 °C (98.2 °F)   Resp 18   Ht 5' 11" (1.803 m)   Wt 78.5 kg (173 lb)   SpO2 100%   BMI 24.13 kg/m²       Post vital signs: stable    Level of consciousness: awake, alert  and oriented    Nausea/Vomiting: no nausea/no vomiting    Complications: none    Airway Patency: patent    Respiratory: unassisted, spontaneous ventilation, room air    Cardiovascular: stable and blood pressure at baseline    Hydration: euvolemic  "

## 2020-08-24 NOTE — H&P
Short Stay Endoscopy History and Physical    PCP - Ryley Porter MD    Procedure - Colonoscopy  ASA - II  Mallampati - per anesthesia  History of Anesthesia problems - no  Family history Anesthesia problems - no     HPI:  This is a 70 y.o. male here for evaluation of : CRC screening    Family history of colon cancer -  no  History of polyps - na  Change in bowel habits - no  Rectal bleeding - no      ROS:  Constitutional: No fevers, chills, No weight loss  ENT: No allergies  CV: No chest pain  Pulm: No shortness of breath  GI: see HPI  Derm: No rash    Medical History:  has a past medical history of High glucose and Urinary tract infection.    Surgical History:  has a past surgical history that includes orthopedic surgery and knee.    Family History: family history includes Cancer in his father; Diabetes in his brother, mother, and sister.. Otherwise no colon cancer, inflammatory bowel disease, or GI malignancies.    Social History:  reports that he has quit smoking. He has never used smokeless tobacco. He reports current alcohol use. He reports that he does not use drugs.    Review of patient's allergies indicates:   Allergen Reactions    Shellfish containing products        Medications:   Medications Prior to Admission   Medication Sig Dispense Refill Last Dose    atorvastatin (LIPITOR) 10 MG tablet TAKE 1 TABLET BY MOUTH EVERY OTHER DAY 45 tablet 3     fluticasone propionate (FLONASE) 50 mcg/actuation nasal spray 1 spray (50 mcg total) by Each Nare route once daily. 1 Bottle 2     glimepiride (AMARYL) 1 MG tablet Take 1 tablet (1 mg total) by mouth before breakfast. 90 tablet 3     lisinopriL (PRINIVIL,ZESTRIL) 2.5 MG tablet TAKE 1 TABLET BY MOUTH ONCE DAILY FOR  PROTECTION  OF  KIDNEYS. 90 tablet 3     metFORMIN (GLUCOPHAGE) 500 MG tablet TAKE 1 TABLET BY MOUTH TWICE DAILY WITH MEALS 180 tablet 0     polyethylene glycol (GOLYTELY,NULYTELY) 236-22.74-6.74 -5.86 gram suspension Take 4,000 mLs (4 L  total) by mouth As instructed. 4000 mL 0     tamsulosin (FLOMAX) 0.4 mg Cap Take 1 capsule (0.4 mg total) by mouth once daily. DO NOT CRUSH OR CHEW; SWALLOW WHOLE. 90 capsule 3          Objective Findings:    Vital Signs: see nursing notes  Physical Exam:  General Appearance: Well appearing in no acute distress  Eyes:    No scleral icterus  ENT: Neck supple  Lungs: CTA anteriorly  Heart:  S1, S2 normal, no murmurs heard  Abdomen: Soft, non tender, non distended with positive bowel sounds. No hepatosplenomegaly, ascites, or mass  Extremities: no edema  Skin: No rash      Labs:  Lab Results   Component Value Date    WBC 5.7 05/26/2020    HGB 14.2 05/26/2020    HCT 43.5 05/26/2020     05/26/2020    CHOL 122 05/26/2020    TRIG 219 (H) 05/26/2020    HDL 36 (L) 05/26/2020    ALT 12 05/26/2020    AST 14 05/26/2020     05/26/2020    K 4.2 05/26/2020     05/26/2020    CREATININE 1.04 05/26/2020    BUN 14 05/26/2020    CO2 32 05/26/2020    TSH 0.90 05/26/2020    HGBA1C 5.0 05/26/2020       I have explained the risks and benefits of endoscopy procedures to the patient including but not limited to bleeding, perforation, infection, and death.    Jimmie Sosa MD

## 2020-08-24 NOTE — PROGRESS NOTES
LINKS immunization registry updated  Care Everywhere updated  Health Maintenance updated  Chart reviewed for overdue Proactive [facility] ([facility]) health maintenance testing (CRS, Breast Ca, Diabetic Eye Exam)   Orders entered:N/A

## 2020-08-24 NOTE — ANESTHESIA POSTPROCEDURE EVALUATION
Anesthesia Post Evaluation    Patient: Odilon Wilder    Procedure(s) Performed: Procedure(s) (LRB):  COLONOSCOPY Golytely (N/A)    Final Anesthesia Type: MAC    Patient location during evaluation: GI PACU  Patient participation: Yes- Able to Participate  Level of consciousness: awake and alert  Post-procedure vital signs: reviewed and stable  Pain management: adequate  Airway patency: patent  COLETTE mitigation strategies: Multimodal analgesia  PONV status at discharge: No PONV  Anesthetic complications: no      Cardiovascular status: hemodynamically stable and blood pressure returned to baseline  Respiratory status: room air, unassisted and spontaneous ventilation  Hydration status: euvolemic  Follow-up not needed.          Vitals Value Taken Time   /82 08/24/20 0933   Temp 36.8 °C (98.2 °F) 08/24/20 0903   Pulse 77 08/24/20 0933   Resp 18 08/24/20 0933   SpO2 100 % 08/24/20 0933         Event Time   Out of Recovery 09:34:42         Pain/Fátima Score: Fátima Score: 10 (8/24/2020  9:33 AM)

## 2020-08-24 NOTE — [employer]ATION PATIENT INSTRUCTIONS
Discharge Summary/Instructions after an Endoscopic Procedure  Patient Name: Odilon Wilder  Patient MRN: [id number]  Patient YOB: 1950  Monday, August 24, 2020  Jimmie Sosa MD  Your health is very important to us during the Covid Crisis. Following your   procedure today, you will receive a daily text for 2 weeks asking about   signs or symptoms of Covid 19.  Please respond to this text when you   receive it so we can follow up and keep you as safe as possible.   RESTRICTIONS:  During your procedure today, you received medications for sedation.  These   medications may affect your judgment, balance and coordination.  Therefore,   for 24 hours, you have the following restrictions:   - DO NOT drive a car, operate machinery, make legal/financial decisions,   sign important papers or drink alcohol.    ACTIVITY:  Today: no heavy lifting, straining or running due to procedural   sedation/anesthesia.  The following day: return to full activity including work.  DIET:  Eat and drink normally unless instructed otherwise.     TREATMENT FOR COMMON SIDE EFFECTS:  - Mild abdominal pain, nausea, belching, bloating or excessive gas:  rest,   eat lightly and use a heating pad.  - Sore Throat: treat with throat lozenges and/or gargle with warm salt   water.  - Because air was used during the procedure, expelling large amounts of air   from your rectum or belching is normal.  - If a bowel prep was taken, you may not have a bowel movement for 1-3 days.    This is normal.  SYMPTOMS TO WATCH FOR AND REPORT TO YOUR PHYSICIAN:  1. Abdominal pain or bloating, other than gas cramps.  2. Chest pain.  3. Back pain.  4. Signs of infection such as: chills or fever occurring within 24 hours   after the procedure.  5. Rectal bleeding, which would show as bright red, maroon, or black stools.   (A tablespoon of blood from the rectum is not serious, especially if   hemorrhoids are present.)  6. Vomiting.  7. Weakness or  dizziness.  GO DIRECTLY TO THE NEAREST EMERGENCY ROOM IF YOU HAVE ANY OF THE FOLLOWING:      Difficulty breathing              Chills and/or fever over 101 F   Persistent vomiting and/or vomiting blood   Severe abdominal pain   Severe chest pain   Black, tarry stools   Bleeding- more than one tablespoon   Any other symptom or condition that you feel may need urgent attention  Your doctor recommends these additional instructions:  If any biopsies were taken, your doctors clinic will contact you in 1 to 2   weeks with any results.  - Discharge patient to home.   - Resume previous diet.   - Continue present medications.   - For future colonoscopy the patient will require an extended preparation.    If there are any questions, please contact the gastroenterologist.   - Repeat colonoscopy in 6 months because the bowel preparation was poor.   - Patient has a contact number available for emergencies.  The signs and   symptoms of potential delayed complications were discussed with the   patient.  Return to normal activities tomorrow.  Written discharge   instructions were provided to the patient.  For questions, problems or results please call your physician - Jimmie Sosa MD.  EMERGENCY PHONE NUMBER: [phone],  LAB RESULTS: [phone]  IF A COMPLICATION OR EMERGENCY SITUATION ARISES AND YOU ARE UNABLE TO REACH   YOUR PHYSICIAN - GO DIRECTLY TO THE EMERGENCY ROOM.  Jimmie Sosa MD  8/24/2020 9:14:53 AM  This report has been verified and signed electronically.  [employer]

## 2020-08-26 ENCOUNTER — OFFICE VISIT (OUTPATIENT)
Dept: UROLOGY | Facility: CLINIC | Age: 70
End: 2020-08-26
Payer: MEDICARE

## 2020-08-26 VITALS
SYSTOLIC BLOOD PRESSURE: 144 MMHG | DIASTOLIC BLOOD PRESSURE: 75 MMHG | HEIGHT: 71 IN | HEART RATE: 90 BPM | BODY MASS INDEX: 24.23 KG/M2 | WEIGHT: 173.06 LBS

## 2020-08-26 DIAGNOSIS — N40.1 BENIGN NON-NODULAR PROSTATIC HYPERPLASIA WITH LOWER URINARY TRACT SYMPTOMS: ICD-10-CM

## 2020-08-26 DIAGNOSIS — C61 PROSTATE CANCER: Primary | ICD-10-CM

## 2020-08-26 DIAGNOSIS — I10 ESSENTIAL HYPERTENSION: ICD-10-CM

## 2020-08-26 PROCEDURE — 3077F SYST BP >= 140 MM HG: CPT | Mod: CPTII,S$GLB,, | Performed by: UROLOGY

## 2020-08-26 PROCEDURE — 1126F PR PAIN SEVERITY QUANTIFIED, NO PAIN PRESENT: ICD-10-PCS | Mod: S$GLB,,, | Performed by: UROLOGY

## 2020-08-26 PROCEDURE — 3078F DIAST BP <80 MM HG: CPT | Mod: CPTII,S$GLB,, | Performed by: UROLOGY

## 2020-08-26 PROCEDURE — 1101F PT FALLS ASSESS-DOCD LE1/YR: CPT | Mod: CPTII,S$GLB,, | Performed by: UROLOGY

## 2020-08-26 PROCEDURE — 99499 UNLISTED E&M SERVICE: CPT | Mod: S$GLB,,, | Performed by: UROLOGY

## 2020-08-26 PROCEDURE — 99214 PR OFFICE/OUTPT VISIT, EST, LEVL IV, 30-39 MIN: ICD-10-PCS | Mod: S$GLB,,, | Performed by: UROLOGY

## 2020-08-26 PROCEDURE — 99499 RISK ADDL DX/OHS AUDIT: ICD-10-PCS | Mod: S$GLB,,, | Performed by: UROLOGY

## 2020-08-26 PROCEDURE — 99999 PR PBB SHADOW E&M-EST. PATIENT-LVL III: ICD-10-PCS | Mod: PBBFAC,,, | Performed by: UROLOGY

## 2020-08-26 PROCEDURE — 3008F BODY MASS INDEX DOCD: CPT | Mod: CPTII,S$GLB,, | Performed by: UROLOGY

## 2020-08-26 PROCEDURE — 3077F PR MOST RECENT SYSTOLIC BLOOD PRESSURE >= 140 MM HG: ICD-10-PCS | Mod: CPTII,S$GLB,, | Performed by: UROLOGY

## 2020-08-26 PROCEDURE — 3008F PR BODY MASS INDEX (BMI) DOCUMENTED: ICD-10-PCS | Mod: CPTII,S$GLB,, | Performed by: UROLOGY

## 2020-08-26 PROCEDURE — 1159F PR MEDICATION LIST DOCUMENTED IN MEDICAL RECORD: ICD-10-PCS | Mod: S$GLB,,, | Performed by: UROLOGY

## 2020-08-26 PROCEDURE — 1101F PR PT FALLS ASSESS DOC 0-1 FALLS W/OUT INJ PAST YR: ICD-10-PCS | Mod: CPTII,S$GLB,, | Performed by: UROLOGY

## 2020-08-26 PROCEDURE — 99999 PR PBB SHADOW E&M-EST. PATIENT-LVL III: CPT | Mod: PBBFAC,,, | Performed by: UROLOGY

## 2020-08-26 PROCEDURE — 3078F PR MOST RECENT DIASTOLIC BLOOD PRESSURE < 80 MM HG: ICD-10-PCS | Mod: CPTII,S$GLB,, | Performed by: UROLOGY

## 2020-08-26 PROCEDURE — 1159F MED LIST DOCD IN RCRD: CPT | Mod: S$GLB,,, | Performed by: UROLOGY

## 2020-08-26 PROCEDURE — 1126F AMNT PAIN NOTED NONE PRSNT: CPT | Mod: S$GLB,,, | Performed by: UROLOGY

## 2020-08-26 PROCEDURE — 99214 OFFICE O/P EST MOD 30 MIN: CPT | Mod: S$GLB,,, | Performed by: UROLOGY

## 2020-08-26 NOTE — PROGRESS NOTES
Subjective:       Patient ID: Odilon Wilder is a 70 y.o. male.    Chief Complaint:  Prostate cancer      History of Present Illness  HPI  Patient is a 70 y.o. male who is established to our clinic and was initially referred by their PCP, Dr. Stapleton for evaluation of elevated psa.    Patient underwent a TRUS prostate biopsy on 7/17/18.  TRUS volume was 140cc.  His pathology showed [place] 3+3 prostate cancer.  He then underwent an MRI of the prostate.  MRI volume--137cc. PIRADS 4 lesion, 1.3cm right apex.  URONAV biopsy done 1/15/19 and again showed asa 3+3.      he feels well.  He returns today for PSA review.  PSA reviewed from 2/18/20 and was 10.7 but more recently on 8/18/20 his psa increased to 19.2.    He denies any bone pain or unexpected weight loss.  He is voiding well without complaints.    Lab Results   Component Value Date    PSADIAG 19.2 (H) 08/18/2020    PSADIAG 10.7 (H) 02/18/2020    PSADIAG 11.6 (H) 08/21/2019    PSADIAG 11.5 (H) 01/08/2019    PSADIAG 10.3 (H) 06/19/2018    PSADIAG 12.5 (H) 05/14/2018         PATHOLOGY:   SPECIMEN  1) Prostate, left apex.  2) Prostate, left middle.  3) Prostate, left base.  4) Prostate, right apex.  5) Prostate, right middle.  6) Prostate, right base.  FINAL PATHOLOGIC DIAGNOSIS  1. PROSTATE, LEFT APEX, BIOPSY:  -Atypical small acinar proliferation (ASAP).  -Immunohistochemical stains for AMACR, P63 and HMWK were performed with adequate controls however focus  of interest was not present on deeper sections.  2. PROSTATE, LEFT MID, BIOPSY:  -Benign prostatic tissue with partial atrophy.  -Immunohistochemical stains for AMACR, P63 and HMWK were performed with adequate controls and support the  diagnosis.  3. PROSTATE, LEFT BASE, BIOPSY:  -Benign prostatic tissue with adenosis.  -Immunohistochemical stains for AMACR, P63 and HMWK were performed with adequate controls and support the  diagnosis.  4. PROSTATE, RIGHT APEX, BIOPSY:  -Minute focus of prostatic  adenocarcinoma, Beasley score 3+3=6.  -Tumor occupies 1% of the tissue submitted (1 Of 2 cores involved).  -Immunohistochemical stains for AMACR, P63 and HMWK were performed with adequate controls and support the  diagnosis.  5. PROSTATE, RIGHT MID, BIOPSY:  -Benign prostatic tissue.  6. PROSTATE, RIGHT BASE, BIOPSY:  -Prostatic adenocarcinoma, Zeferino score 3+3=6.  -Tumor occupies 20% of the tissue submitted (1 of 2 cores involved).    Review of Systems  Review of Systems  All other systems reviewed and negative except pertinent positives noted in HPI.       Objective:     Physical Exam   Nursing note and vitals reviewed.  Constitutional: He is oriented to person, place, and time. He appears well-developed. He is cooperative.   HENT:   Head: Normocephalic.   Neck: No tracheal deviation present.   Cardiovascular: Normal rate.    Pulmonary/Chest: Effort normal. No accessory muscle usage. No tachypnea. No respiratory distress.   Abdominal: Soft. Normal appearance. He exhibits no distension, no fluid wave and no mass. There is no abdominal tenderness. There is no rebound. No hernia. Hernia confirmed negative in the right inguinal area and confirmed negative in the left inguinal area.   Liver/spleen non-palpable.   Genitourinary:    Prostate, testes and penis normal.   Rectum:      No rectal mass, tenderness, external hemorrhoid or abnormal anal tone.   Prostate is not tender. Right testis shows no mass and no tenderness. Right testis is descended. Left testis shows no mass and no tenderness. Left testis is descended. Circumcised.    Genitourinary Comments: Scrotum showed no rashes or lesions.  Anus/perineum without lesion.  Epididymis showed no masses or tenderness. No meatal stenosis, meatus in normal location. No penile discharge/plaques. Prostate smooth, no nodules, 70+ grams.  Seminal vesicles non-palpable.  Normal sphincter tone.          Musculoskeletal: Normal range of motion.   Lymphadenopathy: No inguinal  adenopathy noted on the right or left side.   Neurological: He is alert and oriented to person, place, and time.   Skin: No bruising and no rash noted.     Psychiatric: His speech is normal and behavior is normal. Thought content normal.       Lab Review  Lab Results   Component Value Date    COLORU Yellow 07/24/2018    SPECGRAV 1.025 07/24/2018    PHUR 6.0 07/24/2018    WBCUR n 07/24/2018    NITRITE Negative 07/24/2018    PROTEINUR n 07/24/2018    GLUCOSEUR n 07/24/2018    KETONESU Negative 07/24/2018    UROBILINOGEN 1.0 07/24/2018    BILIRUBINUR n 07/24/2018    RBCUR 3+ 07/24/2018         Assessment:        1. Prostate cancer    2. Benign non-nodular prostatic hyperplasia with lower urinary tract symptoms    3. Essential hypertension            Plan:     Prostate cancer  -     Prostate Specific Antigen, Diagnostic; Future; Expected date: 10/07/2020    Benign non-nodular prostatic hyperplasia with lower urinary tract symptoms    Essential hypertension    1. Prostate cancer:  -given the recent increase in PSA, We will plan for a PSA in 6 weeks.  If this remains elevated above what his baseline is which is about 10, we will plan for either repeat MRI or repeat biopsy or both    2. BPH:   -patient on flomax  -symptoms stable.   -we will continue to monitor this    3. HTN:  --BP reviewed today and elevated  -continue current medications  -encouraged f/u and discussion of BP with PCP.

## 2020-09-03 ENCOUNTER — NURSE TRIAGE (OUTPATIENT)
Dept: ADMINISTRATIVE | Facility: CLINIC | Age: 70
End: 2020-09-03

## 2020-09-03 ENCOUNTER — TELEPHONE (OUTPATIENT)
Dept: GASTROENTEROLOGY | Facility: CLINIC | Age: 70
End: 2020-09-03

## 2020-09-03 NOTE — TELEPHONE ENCOUNTER
Contacted patient on behalf of [facility]'s Covid 19 system tracker.  Patient had colonoscopy on August 24 and patient was tested for Covid pre-procedure and it was negative.  Patient stated last few days he has a mild dry cough but he has been cutting grass with no mask on . Denied fever, SOB and CP.  Informed patient if this cough worsens of if he starts running fever he should call us at [facility] or call his PCP to be re-tested.  Patient agreed and will follow disposition

## 2020-09-03 NOTE — TELEPHONE ENCOUNTER
Post procedure Instructions: Repeat colonoscopy in 6 months because the bowel preparation was poor. Please place on recall.

## 2020-09-03 NOTE — TELEPHONE ENCOUNTER
Reason for Disposition   Cough    Additional Information   Negative: Severe difficulty breathing (e.g., struggling for each breath, speaks in single words)   Negative: Bluish (or gray) lips or face now   Negative: [1] Rapid onset of cough AND [2] has hives   Negative: Coughing started suddenly after medicine, an allergic food or bee sting   Negative: [1] Difficulty breathing AND [2] exposure to flames, smoke, or fumes   Negative: [1] Stridor AND [2] difficulty breathing   Negative: Sounds like a life-threatening emergency to the triager   Negative: Choked on object of food that could be caught in the throat   Negative: Chest pain is main symptom   Negative: [1] Previous asthma attacks AND [2] this feels like asthma attack   Negative: Cough lasts > 3 weeks   Negative: Wet (productive) cough (i.e., white-yellow, yellow, green, or marissa colored sputum)   Negative: [1] Dry (non-productive) cough AND [2] within 14 days of COVID-19 Exposure   Negative: Chest pain  (Exception: MILD central chest pain, present only when coughing)   Negative: Difficulty breathing   Negative: Patient sounds very sick or weak to the triager   Negative: [1] Coughed up blood AND [2] > 1 tablespoon (15 ml) (Exception: blood-tinged sputum)   Negative: Fever > 103 F (39.4 C)   Negative: [1] Fever > 101 F (38.3 C) AND [2] age > 60   Negative: [1] Fever > 100.0 F (37.8 C) AND [2] bedridden (e.g., nursing home patient, CVA, chronic illness, recovering from surgery)   Negative: [1] Fever > 100.0 F (37.8 C) AND [2] diabetes mellitus or weak immune system (e.g., HIV positive, cancer chemo, splenectomy, organ transplant, chronic steroids)   Negative: Wheezing is present   Negative: [1] Ankle swelling AND [2] swelling is increasing   Negative: SEVERE coughing spells (e.g., whooping sound after coughing, vomiting after coughing)   Negative: [1] Continuous (nonstop) coughing interferes with work or school AND [2] no improvement  using cough treatment per protocol   Negative: Fever present > 3 days (72 hours)   Negative: [1] Fever returns after gone for over 24 hours AND [2] symptoms worse or not improved   Negative: [1] Using nasal washes and pain medicine > 24 hours AND [2] sinus pain (around cheekbone or eye) persists   Negative: Earache is present   Negative: Cough has been present for > 3 weeks   Negative: [1] Nasal discharge AND [2] present > 10 days   Negative: [1] Coughed up blood-tinged sputum AND [2] more than once   Negative: [1] Patient also has allergy symptoms (e.g., itchy eyes, clear nasal discharge, postnasal drip) AND [2] they are acting up   Negative: Taking an ACE Inhibitor medication  (e.g., benazepril/LOTENSIN, captopril/CAPOTEN, enalapril/VASOTEC, lisinopril/ZESTRIL)   Negative: Exposure to TB (Tuberculosis)    Protocols used: COUGH - ACUTE NON-PRODUCTIVE-A-AH

## 2020-10-05 ENCOUNTER — PATIENT OUTREACH (OUTPATIENT)
Dept: ADMINISTRATIVE | Facility: OTHER | Age: 70
End: 2020-10-05

## 2020-10-05 DIAGNOSIS — E11.9 TYPE 2 DIABETES MELLITUS WITHOUT COMPLICATION, WITHOUT LONG-TERM CURRENT USE OF INSULIN: Primary | ICD-10-CM

## 2020-10-05 NOTE — PROGRESS NOTES
LINKS immunization registry updated  Care Everywhere updated  Health Maintenance updated  Chart reviewed for overdue Proactive [facility] Encounters ([facility]) health maintenance testing (CRS, Breast Ca, Diabetic Eye Exam)   Orders entered: diabetic eye screening photo

## 2020-10-06 ENCOUNTER — LAB VISIT (OUTPATIENT)
Dept: LAB | Facility: HOSPITAL | Age: 70
End: 2020-10-06
Attending: UROLOGY
Payer: MEDICARE

## 2020-10-06 DIAGNOSIS — C61 PROSTATE CANCER: ICD-10-CM

## 2020-10-06 LAB — COMPLEXED PSA SERPL-MCNC: 13.4 NG/ML (ref 0–4)

## 2020-10-06 PROCEDURE — 36415 COLL VENOUS BLD VENIPUNCTURE: CPT

## 2020-10-06 PROCEDURE — 84153 ASSAY OF PSA TOTAL: CPT

## 2020-10-07 ENCOUNTER — TELEPHONE (OUTPATIENT)
Dept: UROLOGY | Facility: CLINIC | Age: 70
End: 2020-10-07

## 2020-10-07 NOTE — TELEPHONE ENCOUNTER
Spoke to pt informed of PSA resutls and that dr. Warner will discuss with him at next appoinmtnet. m sheree lpn

## 2020-10-12 ENCOUNTER — OFFICE VISIT (OUTPATIENT)
Dept: FAMILY MEDICINE | Facility: CLINIC | Age: 70
End: 2020-10-12
Payer: MEDICARE

## 2020-10-12 VITALS
DIASTOLIC BLOOD PRESSURE: 60 MMHG | SYSTOLIC BLOOD PRESSURE: 132 MMHG | TEMPERATURE: 97 F | HEART RATE: 101 BPM | OXYGEN SATURATION: 95 % | HEIGHT: 71 IN | WEIGHT: 180.69 LBS | BODY MASS INDEX: 25.3 KG/M2

## 2020-10-12 DIAGNOSIS — J30.1 ALLERGIC RHINITIS DUE TO POLLEN, UNSPECIFIED SEASONALITY: ICD-10-CM

## 2020-10-12 DIAGNOSIS — Z23 NEED FOR INFLUENZA VACCINATION: Primary | ICD-10-CM

## 2020-10-12 DIAGNOSIS — E11.9 TYPE 2 DIABETES MELLITUS WITHOUT COMPLICATION, UNSPECIFIED WHETHER LONG TERM INSULIN USE: ICD-10-CM

## 2020-10-12 DIAGNOSIS — I10 ESSENTIAL HYPERTENSION: ICD-10-CM

## 2020-10-12 DIAGNOSIS — Z00.00 ROUTINE HEALTH MAINTENANCE: ICD-10-CM

## 2020-10-12 PROCEDURE — 3044F PR MOST RECENT HEMOGLOBIN A1C LEVEL <7.0%: ICD-10-PCS | Mod: CPTII,S$GLB,, | Performed by: FAMILY MEDICINE

## 2020-10-12 PROCEDURE — 99213 PR OFFICE/OUTPT VISIT, EST, LEVL III, 20-29 MIN: ICD-10-PCS | Mod: 25,S$GLB,, | Performed by: FAMILY MEDICINE

## 2020-10-12 PROCEDURE — 3078F DIAST BP <80 MM HG: CPT | Mod: CPTII,S$GLB,, | Performed by: FAMILY MEDICINE

## 2020-10-12 PROCEDURE — 3075F PR MOST RECENT SYSTOLIC BLOOD PRESS GE 130-139MM HG: ICD-10-PCS | Mod: CPTII,S$GLB,, | Performed by: FAMILY MEDICINE

## 2020-10-12 PROCEDURE — 3075F SYST BP GE 130 - 139MM HG: CPT | Mod: CPTII,S$GLB,, | Performed by: FAMILY MEDICINE

## 2020-10-12 PROCEDURE — G0008 FLU VACCINE - QUADRIVALENT - ADJUVANTED: ICD-10-PCS | Mod: S$GLB,,, | Performed by: FAMILY MEDICINE

## 2020-10-12 PROCEDURE — 90694 FLU VACCINE - QUADRIVALENT - ADJUVANTED: ICD-10-PCS | Mod: S$GLB,,, | Performed by: FAMILY MEDICINE

## 2020-10-12 PROCEDURE — 3078F PR MOST RECENT DIASTOLIC BLOOD PRESSURE < 80 MM HG: ICD-10-PCS | Mod: CPTII,S$GLB,, | Performed by: FAMILY MEDICINE

## 2020-10-12 PROCEDURE — 99213 OFFICE O/P EST LOW 20 MIN: CPT | Mod: 25,S$GLB,, | Performed by: FAMILY MEDICINE

## 2020-10-12 PROCEDURE — 3044F HG A1C LEVEL LT 7.0%: CPT | Mod: CPTII,S$GLB,, | Performed by: FAMILY MEDICINE

## 2020-10-12 PROCEDURE — 90694 VACC AIIV4 NO PRSRV 0.5ML IM: CPT | Mod: S$GLB,,, | Performed by: FAMILY MEDICINE

## 2020-10-12 PROCEDURE — G0008 ADMIN INFLUENZA VIRUS VAC: HCPCS | Mod: S$GLB,,, | Performed by: FAMILY MEDICINE

## 2020-10-12 RX ORDER — MONTELUKAST SODIUM 10 MG/1
10 TABLET ORAL NIGHTLY
Qty: 30 TABLET | Refills: 0 | Status: SHIPPED | OUTPATIENT
Start: 2020-10-12 | End: 2020-11-11

## 2020-10-12 NOTE — PROGRESS NOTES
" Patient ID: Odilon Wilder is a 70 y.o. male.    Chief Complaint: Cough (in the morining coughing up clear mucus), Nasal Congestion, and Eye Problem (watery)    HPI      Odilon Wilder is a 70 y.o. male. here for annual exam.   No current complaints except nasal congestion and cough in the mornings.  Has tried over-the-counter cough medicine with no success.  Has not been using Claritin and or Flonase for his allergic rhinitis.  Complains about itchy watery eyes as well.  No fever chills.  No sick contacts.  Diabetes hypertension under control.  No complaints        Review of Symptoms    Constitutional: Negative.    HENT: Negative.    Eyes: Negative.    Respiratory: Negative.    Cardiovascular: Negative.    Gastrointestinal: Negative.    Endocrine: Negative.    Genitourinary: Negative.    Musculoskeletal: Negative.    Skin: Negative.    Allergic/Immunologic: Negative.    Neurological: Negative.    Hematological: Negative.    Psychiatric/Behavioral: Negative.      Except as above in HPI      Vitals:    10/12/20 1024   BP: 132/60   BP Location: Left arm   Patient Position: Sitting   Pulse: 101   Temp: 97.3 °F (36.3 °C)   TempSrc: Oral   SpO2: 95%   Weight: 81.9 kg (180 lb 10.7 oz)   Height: 5' 11" (1.803 m)        Physical  Exam      Constitutional:  Oriented to person, place, and time. Appears well-developed and well-nourished.     HENT:   Head: Normocephalic and atraumatic.     Right Ear: Tympanic membrane, ear canal and External ear normal     Left Ear: Tympanic membrane, ear canal and External ear normal     Nose: Nose normal. No rhinorrhea or nasal deformity.     Mouth/Throat: Uvula is midline, oropharynx is clear and moist and mucous membranes are normal.      Eyes: Conjunctivae are normal. Right eye exhibits no discharge. Left eye exhibits no discharge. No scleral icterus.     Neck:  No JVD present. No tracheal deviation  [x]  Neck supple.   [x]  No Carotid bruit    Cardiovascular:  Regular rate and rhythm " with normal S1 and S2     Pulmonary/Chest:   Clear to auscultation bilaterally without wheezes, rhonchi or rales    Musculoskeletal: Normal range of motion. No edema or tenderness.   No deformity     Lymphadenopathy:  No cervical adenopathy.     Neurological:  Alert and oriented to person, place, and time. Coordination normal.     Skin: Skin is warm and dry. No rash noted.     Psychiatric: Normal mood and affect. Speech is normal and behavior is normal. Judgment and thought content normal.     Complete Blood Count  Lab Results   Component Value Date    RBC 4.51 05/26/2020    HGB 14.2 05/26/2020    HCT 43.5 05/26/2020    MCV 96.5 05/26/2020    MCH 31.5 05/26/2020    MCHC 32.6 05/26/2020    RDW 11.5 05/26/2020     05/26/2020    MPV 10.2 05/26/2020    GRAN 3.7 08/12/2019    GRAN 57.0 08/12/2019    LYMPH 1,830 05/26/2020    LYMPH 32.1 05/26/2020    MONO 388 05/26/2020    MONO 6.8 05/26/2020     05/26/2020    BASO 17 05/26/2020    EOSINOPHIL 5.6 05/26/2020    BASOPHIL 0.3 05/26/2020    DIFFMETHOD Automated 08/12/2019       Comprehensive Metabolic Panel  Lab Results   Component Value Date     (H) 05/26/2020    BUN 14 05/26/2020    CREATININE 1.04 05/26/2020     05/26/2020    K 4.2 05/26/2020     05/26/2020    PROT 6.3 05/26/2020    ALBUMIN 4.2 05/26/2020    BILITOT 0.7 05/26/2020    AST 14 05/26/2020    ALKPHOS 75 05/26/2020    CO2 32 05/26/2020    ALT 12 05/26/2020    EGFRNONAA 72 05/26/2020    ESTGFRAFRICA 84 05/26/2020       TSH  Lab Results   Component Value Date    TSH 0.90 05/26/2020       Assessment / Plan:      ICD-10-CM ICD-9-CM   1. Need for influenza vaccination  Z23 V04.81     Need for influenza vaccination  -     Influenza (FLUAD) - Quadrivalent (Adjuvanted) *Preferred* (65+) (PF)    Other orders  -     montelukast (SINGULAIR) 10 mg tablet; Take 1 tablet (10 mg total) by mouth every evening. For allergies and cough  Dispense: 30 tablet; Refill: 0          Discussed how to stay  healthy including: diet, exercise, refraining from smoking and discussed screening exams / tests needed for age, sex and family Hx.

## 2020-10-20 ENCOUNTER — OFFICE VISIT (OUTPATIENT)
Dept: UROLOGY | Facility: CLINIC | Age: 70
End: 2020-10-20
Payer: MEDICARE

## 2020-10-20 VITALS
HEIGHT: 71 IN | HEART RATE: 92 BPM | SYSTOLIC BLOOD PRESSURE: 142 MMHG | DIASTOLIC BLOOD PRESSURE: 81 MMHG | BODY MASS INDEX: 25.62 KG/M2 | WEIGHT: 183 LBS

## 2020-10-20 DIAGNOSIS — N40.1 BENIGN NON-NODULAR PROSTATIC HYPERPLASIA WITH LOWER URINARY TRACT SYMPTOMS: ICD-10-CM

## 2020-10-20 DIAGNOSIS — I10 ESSENTIAL HYPERTENSION: ICD-10-CM

## 2020-10-20 DIAGNOSIS — C61 PROSTATE CANCER: Primary | ICD-10-CM

## 2020-10-20 PROCEDURE — 1159F MED LIST DOCD IN RCRD: CPT | Mod: S$GLB,,, | Performed by: UROLOGY

## 2020-10-20 PROCEDURE — 3008F PR BODY MASS INDEX (BMI) DOCUMENTED: ICD-10-PCS | Mod: CPTII,S$GLB,, | Performed by: UROLOGY

## 2020-10-20 PROCEDURE — 1101F PR PT FALLS ASSESS DOC 0-1 FALLS W/OUT INJ PAST YR: ICD-10-PCS | Mod: CPTII,S$GLB,, | Performed by: UROLOGY

## 2020-10-20 PROCEDURE — 1159F PR MEDICATION LIST DOCUMENTED IN MEDICAL RECORD: ICD-10-PCS | Mod: S$GLB,,, | Performed by: UROLOGY

## 2020-10-20 PROCEDURE — 1126F AMNT PAIN NOTED NONE PRSNT: CPT | Mod: S$GLB,,, | Performed by: UROLOGY

## 2020-10-20 PROCEDURE — 3077F SYST BP >= 140 MM HG: CPT | Mod: CPTII,S$GLB,, | Performed by: UROLOGY

## 2020-10-20 PROCEDURE — 1126F PR PAIN SEVERITY QUANTIFIED, NO PAIN PRESENT: ICD-10-PCS | Mod: S$GLB,,, | Performed by: UROLOGY

## 2020-10-20 PROCEDURE — 1101F PT FALLS ASSESS-DOCD LE1/YR: CPT | Mod: CPTII,S$GLB,, | Performed by: UROLOGY

## 2020-10-20 PROCEDURE — 3008F BODY MASS INDEX DOCD: CPT | Mod: CPTII,S$GLB,, | Performed by: UROLOGY

## 2020-10-20 PROCEDURE — 3079F PR MOST RECENT DIASTOLIC BLOOD PRESSURE 80-89 MM HG: ICD-10-PCS | Mod: CPTII,S$GLB,, | Performed by: UROLOGY

## 2020-10-20 PROCEDURE — 99214 PR OFFICE/OUTPT VISIT, EST, LEVL IV, 30-39 MIN: ICD-10-PCS | Mod: S$GLB,,, | Performed by: UROLOGY

## 2020-10-20 PROCEDURE — 99999 PR PBB SHADOW E&M-EST. PATIENT-LVL III: ICD-10-PCS | Mod: PBBFAC,,, | Performed by: UROLOGY

## 2020-10-20 PROCEDURE — 3077F PR MOST RECENT SYSTOLIC BLOOD PRESSURE >= 140 MM HG: ICD-10-PCS | Mod: CPTII,S$GLB,, | Performed by: UROLOGY

## 2020-10-20 PROCEDURE — 3079F DIAST BP 80-89 MM HG: CPT | Mod: CPTII,S$GLB,, | Performed by: UROLOGY

## 2020-10-20 PROCEDURE — 99999 PR PBB SHADOW E&M-EST. PATIENT-LVL III: CPT | Mod: PBBFAC,,, | Performed by: UROLOGY

## 2020-10-20 PROCEDURE — 99214 OFFICE O/P EST MOD 30 MIN: CPT | Mod: S$GLB,,, | Performed by: UROLOGY

## 2020-10-20 NOTE — PROGRESS NOTES
Subjective:       Patient ID: Odilon Wilder is a 70 y.o. male.    Chief Complaint:  Prostate cancer      History of Present Illness  HPI  Patient is a 70 y.o. male who is established to our clinic and was initially referred by their PCP, Dr. Stapleton for evaluation of elevated psa.    Patient underwent a TRUS prostate biopsy on 7/17/18.  TRUS volume was 140cc.  His pathology showed [place] 3+3 prostate cancer.  He then underwent an MRI of the prostate.  MRI volume--137cc. PIRADS 4 lesion, 1.3cm right apex.  URONAV biopsy done 1/15/19 and again showed asa 3+3.      he feels well.  He returns today for PSA review.  PSA reviewed from 10/6/20 and was 13.4 which is down from 8/18/20 when his psa had increased to 19.2.    He denies any bone pain or unexpected weight loss.  He is voiding well without complaints.    Lab Results   Component Value Date    PSADIAG 13.4 (H) 10/06/2020    PSADIAG 19.2 (H) 08/18/2020    PSADIAG 10.7 (H) 02/18/2020    PSADIAG 11.6 (H) 08/21/2019    PSADIAG 11.5 (H) 01/08/2019    PSADIAG 10.3 (H) 06/19/2018    PSADIAG 12.5 (H) 05/14/2018         PATHOLOGY:   SPECIMEN  1) Prostate, left apex.  2) Prostate, left middle.  3) Prostate, left base.  4) Prostate, right apex.  5) Prostate, right middle.  6) Prostate, right base.  FINAL PATHOLOGIC DIAGNOSIS  1. PROSTATE, LEFT APEX, BIOPSY:  -Atypical small acinar proliferation (ASAP).  -Immunohistochemical stains for AMACR, P63 and HMWK were performed with adequate controls however focus  of interest was not present on deeper sections.  2. PROSTATE, LEFT MID, BIOPSY:  -Benign prostatic tissue with partial atrophy.  -Immunohistochemical stains for AMACR, P63 and HMWK were performed with adequate controls and support the  diagnosis.  3. PROSTATE, LEFT BASE, BIOPSY:  -Benign prostatic tissue with adenosis.  -Immunohistochemical stains for AMACR, P63 and HMWK were performed with adequate controls and support the  diagnosis.  4. PROSTATE, RIGHT APEX,  BIOPSY:  -Minute focus of prostatic adenocarcinoma, Graham score 3+3=6.  -Tumor occupies 1% of the tissue submitted (1 Of 2 cores involved).  -Immunohistochemical stains for AMACR, P63 and HMWK were performed with adequate controls and support the  diagnosis.  5. PROSTATE, RIGHT MID, BIOPSY:  -Benign prostatic tissue.  6. PROSTATE, RIGHT BASE, BIOPSY:  -Prostatic adenocarcinoma, Graham score 3+3=6.  -Tumor occupies 20% of the tissue submitted (1 of 2 cores involved).    Review of Systems  Review of Systems  All other systems reviewed and negative except pertinent positives noted in HPI.       Objective:     Physical Exam   Constitutional: He is oriented to person, place, and time. He appears well-developed. No distress.   HENT:   Head: Normocephalic and atraumatic.   Eyes: No scleral icterus.   Neck: No tracheal deviation present.   Pulmonary/Chest: Effort normal. No respiratory distress.   Neurological: He is alert and oriented to person, place, and time.   Psychiatric: His behavior is normal. Judgment and thought content normal.       Lab Review  Lab Results   Component Value Date    COLORU Yellow 07/24/2018    SPECGRAV 1.025 07/24/2018    PHUR 6.0 07/24/2018    WBCUR n 07/24/2018    NITRITE Negative 07/24/2018    PROTEINUR n 07/24/2018    GLUCOSEUR n 07/24/2018    KETONESU Negative 07/24/2018    UROBILINOGEN 1.0 07/24/2018    BILIRUBINUR n 07/24/2018    RBCUR 3+ 07/24/2018         Assessment:        1. Prostate cancer    2. Benign non-nodular prostatic hyperplasia with lower urinary tract symptoms    3. Essential hypertension            Plan:     Prostate cancer    Benign non-nodular prostatic hyperplasia with lower urinary tract symptoms    Essential hypertension       1. Prostate cancer:  -given the PSA has come down, we discussed options of MRI of the prostate vs continued psa monitoring.   -the patient would like to pursue an MRI, but he is not interested in the gadolinium contrast, which he has had family  members have issues with this.  I tried to assuage his fears of the dye, but he does not want a contrasted MRI.  Will call with results.     2. BPH:   -patient on flomax  -symptoms stable.   -we will continue to monitor this    3. HTN:  --BP reviewed today and elevated  -continue current medications  -encouraged f/u and discussion of BP with PCP.

## 2020-11-10 ENCOUNTER — PATIENT OUTREACH (OUTPATIENT)
Dept: ADMINISTRATIVE | Facility: OTHER | Age: 70
End: 2020-11-10

## 2020-11-10 RX ORDER — METFORMIN HYDROCHLORIDE 500 MG/1
TABLET ORAL
Qty: 180 TABLET | Refills: 0 | Status: SHIPPED | OUTPATIENT
Start: 2020-11-10 | End: 2021-02-28

## 2020-11-10 NOTE — PROGRESS NOTES
Care Everywhere:   Immunization:   Health Maintenance: updated  Media Review:   Legacy Review:   Order placed:   Upcoming appts:optometry 11/11

## 2020-11-16 ENCOUNTER — TELEPHONE (OUTPATIENT)
Dept: FAMILY MEDICINE | Facility: CLINIC | Age: 70
End: 2020-11-16

## 2020-11-17 RX ORDER — GLIMEPIRIDE 1 MG/1
TABLET ORAL
Qty: 90 TABLET | Refills: 0 | Status: SHIPPED | OUTPATIENT
Start: 2020-11-17 | End: 2021-02-22

## 2020-12-04 ENCOUNTER — HOSPITAL ENCOUNTER (OUTPATIENT)
Dept: RADIOLOGY | Facility: HOSPITAL | Age: 70
Discharge: HOME | End: 2020-12-04
Attending: UROLOGY
Payer: MEDICARE

## 2020-12-04 DIAGNOSIS — C61 PROSTATE CANCER: ICD-10-CM

## 2020-12-04 PROCEDURE — 72195 MRI PELVIS W/O DYE: CPT | Mod: TC

## 2020-12-04 PROCEDURE — 72195 MRI PROSTATECTOMY WITHOUT CONTRAST: ICD-10-PCS | Mod: 26,,, | Performed by: RADIOLOGY

## 2020-12-04 PROCEDURE — 72195 MRI PELVIS W/O DYE: CPT | Mod: 26,,, | Performed by: RADIOLOGY

## 2020-12-07 DIAGNOSIS — C61 PROSTATE CANCER: Primary | ICD-10-CM

## 2021-02-22 RX ORDER — GLIMEPIRIDE 1 MG/1
TABLET ORAL
Qty: 90 TABLET | Refills: 0 | Status: SHIPPED | OUTPATIENT
Start: 2021-02-22 | End: 2021-05-17

## 2021-02-28 RX ORDER — METFORMIN HYDROCHLORIDE 500 MG/1
TABLET ORAL
Qty: 180 TABLET | Refills: 0 | Status: SHIPPED | OUTPATIENT
Start: 2021-02-28 | End: 2021-07-21

## 2021-05-05 DIAGNOSIS — E11.9 TYPE 2 DIABETES MELLITUS WITHOUT COMPLICATION: ICD-10-CM

## 2021-05-14 ENCOUNTER — TELEPHONE (OUTPATIENT)
Dept: UROLOGY | Facility: CLINIC | Age: 71
End: 2021-05-14

## 2021-05-17 DIAGNOSIS — I10 ESSENTIAL HYPERTENSION: Primary | ICD-10-CM

## 2021-05-17 DIAGNOSIS — E87.5 HYPERKALEMIA: ICD-10-CM

## 2021-05-17 DIAGNOSIS — E11.9 TYPE 2 DIABETES MELLITUS WITHOUT COMPLICATION, WITHOUT LONG-TERM CURRENT USE OF INSULIN: ICD-10-CM

## 2021-05-17 DIAGNOSIS — E11.9 TYPE 2 DIABETES MELLITUS WITHOUT COMPLICATION, UNSPECIFIED WHETHER LONG TERM INSULIN USE: ICD-10-CM

## 2021-05-17 RX ORDER — LISINOPRIL 2.5 MG/1
TABLET ORAL
Qty: 90 TABLET | Refills: 0 | Status: SHIPPED | OUTPATIENT
Start: 2021-05-17 | End: 2021-08-23

## 2021-05-17 RX ORDER — ATORVASTATIN CALCIUM 10 MG/1
TABLET, FILM COATED ORAL
Qty: 45 TABLET | Refills: 0 | Status: SHIPPED | OUTPATIENT
Start: 2021-05-17 | End: 2021-07-21

## 2021-05-17 RX ORDER — GLIMEPIRIDE 1 MG/1
TABLET ORAL
Qty: 90 TABLET | Refills: 0 | Status: SHIPPED | OUTPATIENT
Start: 2021-05-17 | End: 2021-08-23

## 2021-05-19 ENCOUNTER — OFFICE VISIT (OUTPATIENT)
Dept: FAMILY MEDICINE | Facility: CLINIC | Age: 71
End: 2021-05-19
Payer: MEDICARE

## 2021-05-19 VITALS
DIASTOLIC BLOOD PRESSURE: 60 MMHG | WEIGHT: 175.5 LBS | BODY MASS INDEX: 24.57 KG/M2 | SYSTOLIC BLOOD PRESSURE: 132 MMHG | HEIGHT: 71 IN | TEMPERATURE: 98 F | HEART RATE: 106 BPM | OXYGEN SATURATION: 96 %

## 2021-05-19 DIAGNOSIS — E11.9 TYPE 2 DIABETES MELLITUS WITHOUT COMPLICATION, UNSPECIFIED WHETHER LONG TERM INSULIN USE: Primary | ICD-10-CM

## 2021-05-19 DIAGNOSIS — M54.50 ACUTE LEFT-SIDED LOW BACK PAIN WITHOUT SCIATICA: ICD-10-CM

## 2021-05-19 PROCEDURE — 1159F PR MEDICATION LIST DOCUMENTED IN MEDICAL RECORD: ICD-10-PCS | Mod: S$GLB,,, | Performed by: FAMILY MEDICINE

## 2021-05-19 PROCEDURE — 1101F PR PT FALLS ASSESS DOC 0-1 FALLS W/OUT INJ PAST YR: ICD-10-PCS | Mod: CPTII,S$GLB,, | Performed by: FAMILY MEDICINE

## 2021-05-19 PROCEDURE — 3288F FALL RISK ASSESSMENT DOCD: CPT | Mod: CPTII,S$GLB,, | Performed by: FAMILY MEDICINE

## 2021-05-19 PROCEDURE — 1159F MED LIST DOCD IN RCRD: CPT | Mod: S$GLB,,, | Performed by: FAMILY MEDICINE

## 2021-05-19 PROCEDURE — 99213 PR OFFICE/OUTPT VISIT, EST, LEVL III, 20-29 MIN: ICD-10-PCS | Mod: S$GLB,,, | Performed by: FAMILY MEDICINE

## 2021-05-19 PROCEDURE — 99213 OFFICE O/P EST LOW 20 MIN: CPT | Mod: S$GLB,,, | Performed by: FAMILY MEDICINE

## 2021-05-19 PROCEDURE — 3075F SYST BP GE 130 - 139MM HG: CPT | Mod: CPTII,S$GLB,, | Performed by: FAMILY MEDICINE

## 2021-05-19 PROCEDURE — 3078F PR MOST RECENT DIASTOLIC BLOOD PRESSURE < 80 MM HG: ICD-10-PCS | Mod: CPTII,S$GLB,, | Performed by: FAMILY MEDICINE

## 2021-05-19 PROCEDURE — 1101F PT FALLS ASSESS-DOCD LE1/YR: CPT | Mod: CPTII,S$GLB,, | Performed by: FAMILY MEDICINE

## 2021-05-19 PROCEDURE — 3008F BODY MASS INDEX DOCD: CPT | Mod: CPTII,S$GLB,, | Performed by: FAMILY MEDICINE

## 2021-05-19 PROCEDURE — 99499 UNLISTED E&M SERVICE: CPT | Mod: S$GLB,,, | Performed by: FAMILY MEDICINE

## 2021-05-19 PROCEDURE — 1125F AMNT PAIN NOTED PAIN PRSNT: CPT | Mod: S$GLB,,, | Performed by: FAMILY MEDICINE

## 2021-05-19 PROCEDURE — 3288F PR FALLS RISK ASSESSMENT DOCUMENTED: ICD-10-PCS | Mod: CPTII,S$GLB,, | Performed by: FAMILY MEDICINE

## 2021-05-19 PROCEDURE — 3008F PR BODY MASS INDEX (BMI) DOCUMENTED: ICD-10-PCS | Mod: CPTII,S$GLB,, | Performed by: FAMILY MEDICINE

## 2021-05-19 PROCEDURE — 1125F PR PAIN SEVERITY QUANTIFIED, PAIN PRESENT: ICD-10-PCS | Mod: S$GLB,,, | Performed by: FAMILY MEDICINE

## 2021-05-19 PROCEDURE — 3078F DIAST BP <80 MM HG: CPT | Mod: CPTII,S$GLB,, | Performed by: FAMILY MEDICINE

## 2021-05-19 PROCEDURE — 3075F PR MOST RECENT SYSTOLIC BLOOD PRESS GE 130-139MM HG: ICD-10-PCS | Mod: CPTII,S$GLB,, | Performed by: FAMILY MEDICINE

## 2021-05-19 PROCEDURE — 99499 RISK ADDL DX/OHS AUDIT: ICD-10-PCS | Mod: S$GLB,,, | Performed by: FAMILY MEDICINE

## 2021-05-19 RX ORDER — CYCLOBENZAPRINE HCL 10 MG
10 TABLET ORAL 3 TIMES DAILY PRN
Qty: 21 TABLET | Refills: 0 | Status: SHIPPED | OUTPATIENT
Start: 2021-05-19 | End: 2021-05-29

## 2021-05-19 RX ORDER — NAPROXEN 500 MG/1
500 TABLET ORAL 2 TIMES DAILY WITH MEALS
Qty: 14 TABLET | Refills: 0 | Status: SHIPPED | OUTPATIENT
Start: 2021-05-19 | End: 2022-06-28

## 2021-05-24 ENCOUNTER — TELEPHONE (OUTPATIENT)
Dept: FAMILY MEDICINE | Facility: CLINIC | Age: 71
End: 2021-05-24

## 2021-05-24 ENCOUNTER — LAB VISIT (OUTPATIENT)
Dept: LAB | Facility: HOSPITAL | Age: 71
End: 2021-05-24
Attending: FAMILY MEDICINE
Payer: MEDICARE

## 2021-05-24 DIAGNOSIS — E11.9 TYPE 2 DIABETES MELLITUS WITHOUT COMPLICATION, WITHOUT LONG-TERM CURRENT USE OF INSULIN: ICD-10-CM

## 2021-05-24 DIAGNOSIS — E11.9 TYPE 2 DIABETES MELLITUS WITHOUT COMPLICATION, UNSPECIFIED WHETHER LONG TERM INSULIN USE: Primary | ICD-10-CM

## 2021-05-24 DIAGNOSIS — E11.9 TYPE 2 DIABETES MELLITUS WITHOUT COMPLICATION, UNSPECIFIED WHETHER LONG TERM INSULIN USE: ICD-10-CM

## 2021-05-24 DIAGNOSIS — I10 ESSENTIAL HYPERTENSION: ICD-10-CM

## 2021-05-24 DIAGNOSIS — E87.5 HYPERKALEMIA: ICD-10-CM

## 2021-05-24 LAB
ALBUMIN SERPL BCP-MCNC: 4.6 G/DL (ref 3.5–5.2)
ALP SERPL-CCNC: 64 U/L (ref 38–126)
ALT SERPL W/O P-5'-P-CCNC: 13 U/L (ref 10–44)
ANION GAP SERPL CALC-SCNC: 8 MMOL/L (ref 8–16)
AST SERPL-CCNC: 24 U/L (ref 15–46)
BASOPHILS # BLD AUTO: 0.03 K/UL (ref 0–0.2)
BASOPHILS NFR BLD: 0.4 % (ref 0–1.9)
BILIRUB SERPL-MCNC: 1.2 MG/DL (ref 0.1–1)
CALCIUM SERPL-MCNC: 11.2 MG/DL (ref 8.7–10.5)
CHLORIDE SERPL-SCNC: 99 MMOL/L (ref 95–110)
CHOLEST SERPL-MCNC: 141 MG/DL (ref 120–199)
CHOLEST/HDLC SERPL: 3.8 {RATIO} (ref 2–5)
CO2 SERPL-SCNC: 28 MMOL/L (ref 23–29)
CREAT SERPL-MCNC: 1.51 MG/DL (ref 0.5–1.4)
DIFFERENTIAL METHOD: ABNORMAL
EOSINOPHIL # BLD AUTO: 0.2 K/UL (ref 0–0.5)
EOSINOPHIL NFR BLD: 3.2 % (ref 0–8)
ERYTHROCYTE [DISTWIDTH] IN BLOOD BY AUTOMATED COUNT: 10.9 % (ref 11.5–14.5)
EST. GFR  (AFRICAN AMERICAN): 53 ML/MIN/1.73 M^2
EST. GFR  (NON AFRICAN AMERICAN): 45.8 ML/MIN/1.73 M^2
ESTIMATED AVG GLUCOSE: 91 MG/DL (ref 68–131)
GLUCOSE SERPL-MCNC: 77 MG/DL (ref 70–110)
HBA1C MFR BLD: 4.8 % (ref 4–5.6)
HCT VFR BLD AUTO: 48.7 % (ref 40–54)
HDLC SERPL-MCNC: 37 MG/DL (ref 40–75)
HDLC SERPL: 26.2 % (ref 20–50)
HGB BLD-MCNC: 16.5 G/DL (ref 14–18)
IMM GRANULOCYTES # BLD AUTO: 0.02 K/UL (ref 0–0.04)
IMM GRANULOCYTES NFR BLD AUTO: 0.3 % (ref 0–0.5)
LDLC SERPL CALC-MCNC: 73 MG/DL (ref 63–159)
LYMPHOCYTES # BLD AUTO: 1.9 K/UL (ref 1–4.8)
LYMPHOCYTES NFR BLD: 25.9 % (ref 18–48)
MCH RBC QN AUTO: 32.5 PG (ref 27–31)
MCHC RBC AUTO-ENTMCNC: 33.9 G/DL (ref 32–36)
MCV RBC AUTO: 96 FL (ref 82–98)
MONOCYTES # BLD AUTO: 0.5 K/UL (ref 0.3–1)
MONOCYTES NFR BLD: 7.3 % (ref 4–15)
NEUTROPHILS # BLD AUTO: 4.6 K/UL (ref 1.8–7.7)
NEUTROPHILS NFR BLD: 62.9 % (ref 38–73)
NONHDLC SERPL-MCNC: 104 MG/DL
NRBC BLD-RTO: 0 /100 WBC
PLATELET # BLD AUTO: 206 K/UL (ref 150–450)
PMV BLD AUTO: 9.9 FL (ref 9.2–12.9)
POTASSIUM SERPL-SCNC: 5.5 MMOL/L (ref 3.5–5.1)
PROT SERPL-MCNC: 8.3 G/DL (ref 6–8.4)
RBC # BLD AUTO: 5.07 M/UL (ref 4.6–6.2)
SODIUM SERPL-SCNC: 135 MMOL/L (ref 136–145)
TRIGL SERPL-MCNC: 155 MG/DL (ref 30–150)
TSH SERPL DL<=0.005 MIU/L-ACNC: 1.14 UIU/ML (ref 0.4–4)
UUN UR-MCNC: 31 MG/DL (ref 2–20)
WBC # BLD AUTO: 7.25 K/UL (ref 3.9–12.7)

## 2021-05-24 PROCEDURE — 85025 COMPLETE CBC W/AUTO DIFF WBC: CPT | Mod: PO | Performed by: FAMILY MEDICINE

## 2021-05-24 PROCEDURE — 80053 COMPREHEN METABOLIC PANEL: CPT | Mod: PO | Performed by: FAMILY MEDICINE

## 2021-05-24 PROCEDURE — 80061 LIPID PANEL: CPT | Performed by: FAMILY MEDICINE

## 2021-05-24 PROCEDURE — 36415 COLL VENOUS BLD VENIPUNCTURE: CPT | Mod: PO | Performed by: FAMILY MEDICINE

## 2021-05-24 PROCEDURE — 84443 ASSAY THYROID STIM HORMONE: CPT | Mod: PO | Performed by: FAMILY MEDICINE

## 2021-05-24 PROCEDURE — 83036 HEMOGLOBIN GLYCOSYLATED A1C: CPT | Performed by: FAMILY MEDICINE

## 2021-05-31 ENCOUNTER — HOSPITAL ENCOUNTER (EMERGENCY)
Facility: HOSPITAL | Age: 71
Discharge: HOME | End: 2021-05-31
Attending: EMERGENCY MEDICINE
Payer: MEDICARE

## 2021-05-31 VITALS
WEIGHT: 175 LBS | TEMPERATURE: 98 F | HEIGHT: 70 IN | BODY MASS INDEX: 25.05 KG/M2 | SYSTOLIC BLOOD PRESSURE: 135 MMHG | OXYGEN SATURATION: 96 % | RESPIRATION RATE: 18 BRPM | DIASTOLIC BLOOD PRESSURE: 77 MMHG | HEART RATE: 95 BPM

## 2021-05-31 DIAGNOSIS — R33.9 URINARY RETENTION: Primary | ICD-10-CM

## 2021-05-31 LAB
BILIRUB UR QL STRIP: NEGATIVE
CLARITY UR REFRACT.AUTO: CLEAR
COLOR UR AUTO: YELLOW
GLUCOSE UR QL STRIP: NEGATIVE
HGB UR QL STRIP: ABNORMAL
KETONES UR QL STRIP: NEGATIVE
LEUKOCYTE ESTERASE UR QL STRIP: NEGATIVE
NITRITE UR QL STRIP: NEGATIVE
PH UR STRIP: 8 [PH] (ref 5–8)
PROT UR QL STRIP: NEGATIVE
SP GR UR STRIP: 1.01 (ref 1–1.03)
URN SPEC COLLECT METH UR: ABNORMAL
UROBILINOGEN UR STRIP-ACNC: NEGATIVE EU/DL

## 2021-05-31 PROCEDURE — 81003 URINALYSIS AUTO W/O SCOPE: CPT | Mod: ER | Performed by: EMERGENCY MEDICINE

## 2021-05-31 PROCEDURE — 99283 EMERGENCY DEPT VISIT LOW MDM: CPT | Mod: ER

## 2021-06-01 ENCOUNTER — HOSPITAL ENCOUNTER (EMERGENCY)
Facility: HOSPITAL | Age: 71
Discharge: HOME | End: 2021-06-01
Attending: EMERGENCY MEDICINE
Payer: MEDICARE

## 2021-06-01 VITALS
BODY MASS INDEX: 25.05 KG/M2 | DIASTOLIC BLOOD PRESSURE: 72 MMHG | OXYGEN SATURATION: 95 % | TEMPERATURE: 99 F | WEIGHT: 175 LBS | SYSTOLIC BLOOD PRESSURE: 124 MMHG | HEART RATE: 86 BPM | HEIGHT: 70 IN | RESPIRATION RATE: 20 BRPM

## 2021-06-01 VITALS
BODY MASS INDEX: 25.05 KG/M2 | DIASTOLIC BLOOD PRESSURE: 63 MMHG | TEMPERATURE: 98 F | SYSTOLIC BLOOD PRESSURE: 118 MMHG | HEIGHT: 70 IN | RESPIRATION RATE: 19 BRPM | HEART RATE: 91 BPM | OXYGEN SATURATION: 96 % | WEIGHT: 175 LBS

## 2021-06-01 DIAGNOSIS — R31.9 HEMATURIA, UNSPECIFIED TYPE: Primary | ICD-10-CM

## 2021-06-01 DIAGNOSIS — R33.8 ACUTE URINARY RETENTION: Primary | ICD-10-CM

## 2021-06-01 DIAGNOSIS — R33.9 URINARY RETENTION: ICD-10-CM

## 2021-06-01 LAB
BACTERIA #/AREA URNS AUTO: ABNORMAL /HPF
BASOPHILS # BLD AUTO: 0.03 K/UL (ref 0–0.2)
BASOPHILS NFR BLD: 0.4 % (ref 0–1.9)
BILIRUB UR QL STRIP: NEGATIVE
CLARITY UR REFRACT.AUTO: ABNORMAL
COLOR UR AUTO: ABNORMAL
DIFFERENTIAL METHOD: ABNORMAL
EOSINOPHIL # BLD AUTO: 0.3 K/UL (ref 0–0.5)
EOSINOPHIL NFR BLD: 3.3 % (ref 0–8)
ERYTHROCYTE [DISTWIDTH] IN BLOOD BY AUTOMATED COUNT: 10.9 % (ref 11.5–14.5)
GLUCOSE UR QL STRIP: NEGATIVE
HCT VFR BLD AUTO: 48 % (ref 40–54)
HGB BLD-MCNC: 16 G/DL (ref 14–18)
HGB UR QL STRIP: ABNORMAL
HYALINE CASTS UR QL AUTO: 0 /LPF
IMM GRANULOCYTES # BLD AUTO: 0.02 K/UL (ref 0–0.04)
IMM GRANULOCYTES NFR BLD AUTO: 0.3 % (ref 0–0.5)
KETONES UR QL STRIP: NEGATIVE
LEUKOCYTE ESTERASE UR QL STRIP: ABNORMAL
LYMPHOCYTES # BLD AUTO: 1.9 K/UL (ref 1–4.8)
LYMPHOCYTES NFR BLD: 23.9 % (ref 18–48)
MCH RBC QN AUTO: 32.3 PG (ref 27–31)
MCHC RBC AUTO-ENTMCNC: 33.3 G/DL (ref 32–36)
MCV RBC AUTO: 97 FL (ref 82–98)
MICROSCOPIC COMMENT: ABNORMAL
MONOCYTES # BLD AUTO: 0.5 K/UL (ref 0.3–1)
MONOCYTES NFR BLD: 6.4 % (ref 4–15)
NEUTROPHILS # BLD AUTO: 5.2 K/UL (ref 1.8–7.7)
NEUTROPHILS NFR BLD: 65.7 % (ref 38–73)
NITRITE UR QL STRIP: NEGATIVE
NRBC BLD-RTO: 0 /100 WBC
PH UR STRIP: 8 [PH] (ref 5–8)
PLATELET # BLD AUTO: 216 K/UL (ref 150–450)
PMV BLD AUTO: 9.5 FL (ref 9.2–12.9)
PROT UR QL STRIP: ABNORMAL
RBC # BLD AUTO: 4.96 M/UL (ref 4.6–6.2)
RBC #/AREA URNS AUTO: >100 /HPF (ref 0–4)
SP GR UR STRIP: 1.01 (ref 1–1.03)
URN SPEC COLLECT METH UR: ABNORMAL
UROBILINOGEN UR STRIP-ACNC: NEGATIVE EU/DL
WBC # BLD AUTO: 7.84 K/UL (ref 3.9–12.7)
WBC #/AREA URNS AUTO: 2 /HPF (ref 0–5)

## 2021-06-01 PROCEDURE — 99283 EMERGENCY DEPT VISIT LOW MDM: CPT | Mod: 25,ER

## 2021-06-01 PROCEDURE — 51702 INSERT TEMP BLADDER CATH: CPT | Mod: ER

## 2021-06-01 PROCEDURE — 81000 URINALYSIS NONAUTO W/SCOPE: CPT | Mod: ER | Performed by: EMERGENCY MEDICINE

## 2021-06-01 PROCEDURE — 99283 EMERGENCY DEPT VISIT LOW MDM: CPT | Mod: 25,27,ER

## 2021-06-01 PROCEDURE — 85025 COMPLETE CBC W/AUTO DIFF WBC: CPT | Mod: ER | Performed by: EMERGENCY MEDICINE

## 2021-06-01 RX ORDER — TRAMADOL HYDROCHLORIDE 50 MG/1
50 TABLET ORAL EVERY 6 HOURS PRN
Qty: 12 TABLET | Refills: 0 | Status: SHIPPED | OUTPATIENT
Start: 2021-06-01 | End: 2022-06-28

## 2021-06-04 ENCOUNTER — PATIENT OUTREACH (OUTPATIENT)
Dept: ADMINISTRATIVE | Facility: OTHER | Age: 71
End: 2021-06-04

## 2021-06-04 ENCOUNTER — LAB VISIT (OUTPATIENT)
Dept: LAB | Facility: HOSPITAL | Age: 71
End: 2021-06-04
Attending: UROLOGY
Payer: MEDICARE

## 2021-06-04 ENCOUNTER — TELEPHONE (OUTPATIENT)
Dept: FAMILY MEDICINE | Facility: CLINIC | Age: 71
End: 2021-06-04

## 2021-06-04 DIAGNOSIS — C61 PROSTATE CANCER: ICD-10-CM

## 2021-06-04 DIAGNOSIS — E11.9 TYPE 2 DIABETES MELLITUS WITHOUT COMPLICATION, UNSPECIFIED WHETHER LONG TERM INSULIN USE: ICD-10-CM

## 2021-06-04 DIAGNOSIS — I10 ESSENTIAL HYPERTENSION: ICD-10-CM

## 2021-06-04 LAB
ANION GAP SERPL CALC-SCNC: 8 MMOL/L (ref 8–16)
CALCIUM SERPL-MCNC: 10.4 MG/DL (ref 8.7–10.5)
CHLORIDE SERPL-SCNC: 97 MMOL/L (ref 95–110)
CO2 SERPL-SCNC: 31 MMOL/L (ref 23–29)
COMPLEXED PSA SERPL-MCNC: 21.5 NG/ML (ref 0–4)
CREAT SERPL-MCNC: 1.27 MG/DL (ref 0.5–1.4)
EST. GFR  (AFRICAN AMERICAN): >60 ML/MIN/1.73 M^2
EST. GFR  (NON AFRICAN AMERICAN): 56.5 ML/MIN/1.73 M^2
GLUCOSE SERPL-MCNC: 97 MG/DL (ref 70–110)
POTASSIUM SERPL-SCNC: 4.7 MMOL/L (ref 3.5–5.1)
SODIUM SERPL-SCNC: 136 MMOL/L (ref 136–145)
UUN UR-MCNC: 23 MG/DL (ref 2–20)

## 2021-06-04 PROCEDURE — 36415 COLL VENOUS BLD VENIPUNCTURE: CPT | Mod: PO | Performed by: UROLOGY

## 2021-06-04 PROCEDURE — 84153 ASSAY OF PSA TOTAL: CPT | Performed by: UROLOGY

## 2021-06-04 PROCEDURE — 80048 BASIC METABOLIC PNL TOTAL CA: CPT | Mod: PO | Performed by: FAMILY MEDICINE

## 2021-06-08 ENCOUNTER — OFFICE VISIT (OUTPATIENT)
Dept: UROLOGY | Facility: CLINIC | Age: 71
End: 2021-06-08
Payer: MEDICARE

## 2021-06-08 VITALS
SYSTOLIC BLOOD PRESSURE: 144 MMHG | BODY MASS INDEX: 25.11 KG/M2 | DIASTOLIC BLOOD PRESSURE: 94 MMHG | HEART RATE: 88 BPM | WEIGHT: 175 LBS

## 2021-06-08 DIAGNOSIS — N40.1 BENIGN NON-NODULAR PROSTATIC HYPERPLASIA WITH LOWER URINARY TRACT SYMPTOMS: ICD-10-CM

## 2021-06-08 DIAGNOSIS — C61 PROSTATE CANCER: Primary | ICD-10-CM

## 2021-06-08 DIAGNOSIS — R33.9 URINARY RETENTION: ICD-10-CM

## 2021-06-08 DIAGNOSIS — I10 ESSENTIAL HYPERTENSION: ICD-10-CM

## 2021-06-08 PROCEDURE — 1159F PR MEDICATION LIST DOCUMENTED IN MEDICAL RECORD: ICD-10-PCS | Mod: S$GLB,,, | Performed by: UROLOGY

## 2021-06-08 PROCEDURE — 3008F PR BODY MASS INDEX (BMI) DOCUMENTED: ICD-10-PCS | Mod: CPTII,S$GLB,, | Performed by: UROLOGY

## 2021-06-08 PROCEDURE — 99214 OFFICE O/P EST MOD 30 MIN: CPT | Mod: S$GLB,,, | Performed by: UROLOGY

## 2021-06-08 PROCEDURE — 1126F PR PAIN SEVERITY QUANTIFIED, NO PAIN PRESENT: ICD-10-PCS | Mod: S$GLB,,, | Performed by: UROLOGY

## 2021-06-08 PROCEDURE — 99499 RISK ADDL DX/OHS AUDIT: ICD-10-PCS | Mod: S$GLB,,, | Performed by: UROLOGY

## 2021-06-08 PROCEDURE — 99999 PR PBB SHADOW E&M-EST. PATIENT-LVL II: CPT | Mod: PBBFAC,,, | Performed by: UROLOGY

## 2021-06-08 PROCEDURE — 3288F FALL RISK ASSESSMENT DOCD: CPT | Mod: CPTII,S$GLB,, | Performed by: UROLOGY

## 2021-06-08 PROCEDURE — 1159F MED LIST DOCD IN RCRD: CPT | Mod: S$GLB,,, | Performed by: UROLOGY

## 2021-06-08 PROCEDURE — 1101F PR PT FALLS ASSESS DOC 0-1 FALLS W/OUT INJ PAST YR: ICD-10-PCS | Mod: CPTII,S$GLB,, | Performed by: UROLOGY

## 2021-06-08 PROCEDURE — 99214 PR OFFICE/OUTPT VISIT, EST, LEVL IV, 30-39 MIN: ICD-10-PCS | Mod: S$GLB,,, | Performed by: UROLOGY

## 2021-06-08 PROCEDURE — 3008F BODY MASS INDEX DOCD: CPT | Mod: CPTII,S$GLB,, | Performed by: UROLOGY

## 2021-06-08 PROCEDURE — 3288F PR FALLS RISK ASSESSMENT DOCUMENTED: ICD-10-PCS | Mod: CPTII,S$GLB,, | Performed by: UROLOGY

## 2021-06-08 PROCEDURE — 99499 UNLISTED E&M SERVICE: CPT | Mod: S$GLB,,, | Performed by: UROLOGY

## 2021-06-08 PROCEDURE — 1101F PT FALLS ASSESS-DOCD LE1/YR: CPT | Mod: CPTII,S$GLB,, | Performed by: UROLOGY

## 2021-06-08 PROCEDURE — 1126F AMNT PAIN NOTED NONE PRSNT: CPT | Mod: S$GLB,,, | Performed by: UROLOGY

## 2021-06-08 PROCEDURE — 99999 PR PBB SHADOW E&M-EST. PATIENT-LVL II: ICD-10-PCS | Mod: PBBFAC,,, | Performed by: UROLOGY

## 2021-07-20 ENCOUNTER — LAB VISIT (OUTPATIENT)
Dept: LAB | Facility: HOSPITAL | Age: 71
End: 2021-07-20
Attending: UROLOGY
Payer: MEDICARE

## 2021-07-20 DIAGNOSIS — C61 PROSTATE CANCER: ICD-10-CM

## 2021-07-20 LAB — COMPLEXED PSA SERPL-MCNC: 13.6 NG/ML (ref 0–4)

## 2021-07-20 PROCEDURE — 36415 COLL VENOUS BLD VENIPUNCTURE: CPT | Mod: PO | Performed by: UROLOGY

## 2021-07-20 PROCEDURE — 84153 ASSAY OF PSA TOTAL: CPT | Performed by: UROLOGY

## 2021-07-21 RX ORDER — ATORVASTATIN CALCIUM 10 MG/1
TABLET, FILM COATED ORAL
Qty: 45 TABLET | Refills: 0 | Status: SHIPPED | OUTPATIENT
Start: 2021-07-21 | End: 2021-10-26

## 2021-07-21 RX ORDER — METFORMIN HYDROCHLORIDE 500 MG/1
TABLET ORAL
Qty: 180 TABLET | Refills: 0 | Status: SHIPPED | OUTPATIENT
Start: 2021-07-21 | End: 2021-12-20

## 2021-07-23 DIAGNOSIS — C61 PROSTATE CANCER: Primary | ICD-10-CM

## 2021-07-23 RX ORDER — ENEMA 19; 7 G/133ML; G/133ML
1 ENEMA RECTAL ONCE
Qty: 1 BOTTLE | Refills: 0 | Status: SHIPPED | OUTPATIENT
Start: 2021-07-23 | End: 2021-07-23

## 2021-07-23 RX ORDER — CIPROFLOXACIN 500 MG/1
TABLET ORAL
Qty: 4 TABLET | Refills: 0 | Status: SHIPPED | OUTPATIENT
Start: 2021-07-23 | End: 2022-06-28

## 2021-08-18 ENCOUNTER — PROCEDURE VISIT (OUTPATIENT)
Dept: UROLOGY | Facility: CLINIC | Age: 71
End: 2021-08-18
Payer: MEDICARE

## 2021-08-18 VITALS
DIASTOLIC BLOOD PRESSURE: 80 MMHG | WEIGHT: 173.81 LBS | TEMPERATURE: 98 F | HEIGHT: 69 IN | BODY MASS INDEX: 25.74 KG/M2 | RESPIRATION RATE: 16 BRPM | SYSTOLIC BLOOD PRESSURE: 141 MMHG | HEART RATE: 92 BPM

## 2021-08-18 DIAGNOSIS — C61 PROSTATE CANCER: Primary | ICD-10-CM

## 2021-08-18 PROCEDURE — 88305 TISSUE EXAM BY PATHOLOGIST: CPT | Performed by: PATHOLOGY

## 2021-08-18 PROCEDURE — 88341 IMHCHEM/IMCYTCHM EA ADD ANTB: CPT | Performed by: PATHOLOGY

## 2021-08-18 PROCEDURE — 88342 IMHCHEM/IMCYTCHM 1ST ANTB: CPT | Mod: 26,,, | Performed by: PATHOLOGY

## 2021-08-18 PROCEDURE — 88342 CHG IMMUNOCYTOCHEMISTRY: ICD-10-PCS | Mod: 26,,, | Performed by: PATHOLOGY

## 2021-08-18 PROCEDURE — 76872 US TRANSRECTAL: CPT | Mod: 26,S$GLB,, | Performed by: UROLOGY

## 2021-08-18 PROCEDURE — 55700 PR BIOPSY OF PROSTATE,NEEDLE/PUNCH: ICD-10-PCS | Mod: S$GLB,,, | Performed by: UROLOGY

## 2021-08-18 PROCEDURE — 88341 IMHCHEM/IMCYTCHM EA ADD ANTB: CPT | Mod: 26,,, | Performed by: PATHOLOGY

## 2021-08-18 PROCEDURE — 88342 IMHCHEM/IMCYTCHM 1ST ANTB: CPT | Mod: 59 | Performed by: PATHOLOGY

## 2021-08-18 PROCEDURE — 88305 TISSUE EXAM BY PATHOLOGIST: ICD-10-PCS | Mod: 26,,, | Performed by: PATHOLOGY

## 2021-08-18 PROCEDURE — 88305 TISSUE EXAM BY PATHOLOGIST: CPT | Mod: 26,,, | Performed by: PATHOLOGY

## 2021-08-18 PROCEDURE — 88341 PR IHC OR ICC EACH ADD'L SINGLE ANTIBODY  STAINPR: ICD-10-PCS | Mod: 26,,, | Performed by: PATHOLOGY

## 2021-08-18 PROCEDURE — 76872 PR US TRANSRECTAL: ICD-10-PCS | Mod: 26,S$GLB,, | Performed by: UROLOGY

## 2021-08-18 PROCEDURE — 55700 PR BIOPSY OF PROSTATE,NEEDLE/PUNCH: CPT | Mod: S$GLB,,, | Performed by: UROLOGY

## 2021-08-18 RX ORDER — LIDOCAINE HYDROCHLORIDE 20 MG/ML
JELLY TOPICAL
Status: COMPLETED | OUTPATIENT
Start: 2021-08-18 | End: 2021-08-18

## 2021-08-18 RX ORDER — LIDOCAINE HYDROCHLORIDE 10 MG/ML
20 INJECTION INFILTRATION; PERINEURAL
Status: COMPLETED | OUTPATIENT
Start: 2021-08-18 | End: 2021-08-18

## 2021-08-18 RX ADMIN — LIDOCAINE HYDROCHLORIDE 20 ML: 10 INJECTION INFILTRATION; PERINEURAL at 03:08

## 2021-08-18 RX ADMIN — LIDOCAINE HYDROCHLORIDE: 20 JELLY TOPICAL at 03:08

## 2021-08-27 ENCOUNTER — TELEPHONE (OUTPATIENT)
Dept: UROLOGY | Facility: CLINIC | Age: 71
End: 2021-08-27

## 2021-09-14 ENCOUNTER — OFFICE VISIT (OUTPATIENT)
Dept: UROLOGY | Facility: CLINIC | Age: 71
End: 2021-09-14
Payer: MEDICARE

## 2021-09-14 DIAGNOSIS — C61 PROSTATE CANCER: Primary | ICD-10-CM

## 2021-09-14 DIAGNOSIS — I10 ESSENTIAL HYPERTENSION: ICD-10-CM

## 2021-09-14 DIAGNOSIS — N40.1 BENIGN NON-NODULAR PROSTATIC HYPERPLASIA WITH LOWER URINARY TRACT SYMPTOMS: ICD-10-CM

## 2021-09-14 DIAGNOSIS — N39.0 ACUTE UTI: ICD-10-CM

## 2021-09-14 PROCEDURE — 3066F PR DOCUMENTATION OF TREATMENT FOR NEPHROPATHY: ICD-10-PCS | Mod: CPTII,S$GLB,, | Performed by: UROLOGY

## 2021-09-14 PROCEDURE — 1101F PT FALLS ASSESS-DOCD LE1/YR: CPT | Mod: CPTII,S$GLB,, | Performed by: UROLOGY

## 2021-09-14 PROCEDURE — 99499 UNLISTED E&M SERVICE: CPT | Mod: S$GLB,,, | Performed by: UROLOGY

## 2021-09-14 PROCEDURE — 99215 OFFICE O/P EST HI 40 MIN: CPT | Mod: S$GLB,,, | Performed by: UROLOGY

## 2021-09-14 PROCEDURE — 99215 PR OFFICE/OUTPT VISIT, EST, LEVL V, 40-54 MIN: ICD-10-PCS | Mod: S$GLB,,, | Performed by: UROLOGY

## 2021-09-14 PROCEDURE — 99999 PR PBB SHADOW E&M-EST. PATIENT-LVL III: CPT | Mod: PBBFAC,,, | Performed by: UROLOGY

## 2021-09-14 PROCEDURE — 3066F NEPHROPATHY DOC TX: CPT | Mod: CPTII,S$GLB,, | Performed by: UROLOGY

## 2021-09-14 PROCEDURE — 1160F PR REVIEW ALL MEDS BY PRESCRIBER/CLIN PHARMACIST DOCUMENTED: ICD-10-PCS | Mod: CPTII,S$GLB,, | Performed by: UROLOGY

## 2021-09-14 PROCEDURE — 87086 URINE CULTURE/COLONY COUNT: CPT | Performed by: UROLOGY

## 2021-09-14 PROCEDURE — 3061F NEG MICROALBUMINURIA REV: CPT | Mod: CPTII,S$GLB,, | Performed by: UROLOGY

## 2021-09-14 PROCEDURE — 3288F PR FALLS RISK ASSESSMENT DOCUMENTED: ICD-10-PCS | Mod: CPTII,S$GLB,, | Performed by: UROLOGY

## 2021-09-14 PROCEDURE — 1159F MED LIST DOCD IN RCRD: CPT | Mod: CPTII,S$GLB,, | Performed by: UROLOGY

## 2021-09-14 PROCEDURE — 1101F PR PT FALLS ASSESS DOC 0-1 FALLS W/OUT INJ PAST YR: ICD-10-PCS | Mod: CPTII,S$GLB,, | Performed by: UROLOGY

## 2021-09-14 PROCEDURE — 1159F PR MEDICATION LIST DOCUMENTED IN MEDICAL RECORD: ICD-10-PCS | Mod: CPTII,S$GLB,, | Performed by: UROLOGY

## 2021-09-14 PROCEDURE — 1160F RVW MEDS BY RX/DR IN RCRD: CPT | Mod: CPTII,S$GLB,, | Performed by: UROLOGY

## 2021-09-14 PROCEDURE — 4010F ACE/ARB THERAPY RXD/TAKEN: CPT | Mod: CPTII,S$GLB,, | Performed by: UROLOGY

## 2021-09-14 PROCEDURE — 4010F PR ACE/ARB THEARPY RXD/TAKEN: ICD-10-PCS | Mod: CPTII,S$GLB,, | Performed by: UROLOGY

## 2021-09-14 PROCEDURE — 99499 RISK ADDL DX/OHS AUDIT: ICD-10-PCS | Mod: S$GLB,,, | Performed by: UROLOGY

## 2021-09-14 PROCEDURE — 3288F FALL RISK ASSESSMENT DOCD: CPT | Mod: CPTII,S$GLB,, | Performed by: UROLOGY

## 2021-09-14 PROCEDURE — 99999 PR PBB SHADOW E&M-EST. PATIENT-LVL III: ICD-10-PCS | Mod: PBBFAC,,, | Performed by: UROLOGY

## 2021-09-14 PROCEDURE — 3044F HG A1C LEVEL LT 7.0%: CPT | Mod: CPTII,S$GLB,, | Performed by: UROLOGY

## 2021-09-14 PROCEDURE — 3061F PR NEG MICROALBUMINURIA RESULT DOCUMENTED/REVIEW: ICD-10-PCS | Mod: CPTII,S$GLB,, | Performed by: UROLOGY

## 2021-09-14 PROCEDURE — 3044F PR MOST RECENT HEMOGLOBIN A1C LEVEL <7.0%: ICD-10-PCS | Mod: CPTII,S$GLB,, | Performed by: UROLOGY

## 2021-09-14 PROCEDURE — 81001 URINALYSIS AUTO W/SCOPE: CPT | Performed by: UROLOGY

## 2021-09-15 LAB
BACTERIA #/AREA URNS AUTO: NORMAL /HPF
BACTERIA UR CULT: NORMAL
BACTERIA UR CULT: NORMAL
BILIRUB UR QL STRIP: NEGATIVE
CLARITY UR REFRACT.AUTO: ABNORMAL
COLOR UR AUTO: YELLOW
GLUCOSE UR QL STRIP: NEGATIVE
HGB UR QL STRIP: NEGATIVE
KETONES UR QL STRIP: NEGATIVE
LEUKOCYTE ESTERASE UR QL STRIP: NEGATIVE
MICROSCOPIC COMMENT: NORMAL
NITRITE UR QL STRIP: NEGATIVE
PH UR STRIP: 8 [PH] (ref 5–8)
PROT UR QL STRIP: NEGATIVE
RBC #/AREA URNS AUTO: 3 /HPF (ref 0–4)
SP GR UR STRIP: 1.02 (ref 1–1.03)
URN SPEC COLLECT METH UR: ABNORMAL
WBC #/AREA URNS AUTO: 4 /HPF (ref 0–5)

## 2021-09-16 ENCOUNTER — TELEPHONE (OUTPATIENT)
Dept: UROLOGY | Facility: CLINIC | Age: 71
End: 2021-09-16

## 2021-09-17 ENCOUNTER — TELEPHONE (OUTPATIENT)
Dept: RADIATION ONCOLOGY | Facility: CLINIC | Age: 71
End: 2021-09-17

## 2021-09-23 ENCOUNTER — TELEPHONE (OUTPATIENT)
Dept: RADIATION ONCOLOGY | Facility: CLINIC | Age: 71
End: 2021-09-23

## 2021-10-04 ENCOUNTER — OFFICE VISIT (OUTPATIENT)
Dept: RADIATION ONCOLOGY | Facility: CLINIC | Age: 71
End: 2021-10-04
Payer: MEDICARE

## 2021-10-04 VITALS
HEIGHT: 71 IN | OXYGEN SATURATION: 98 % | HEART RATE: 76 BPM | SYSTOLIC BLOOD PRESSURE: 143 MMHG | TEMPERATURE: 98 F | DIASTOLIC BLOOD PRESSURE: 75 MMHG | RESPIRATION RATE: 15 BRPM | BODY MASS INDEX: 24.87 KG/M2 | WEIGHT: 177.63 LBS

## 2021-10-04 DIAGNOSIS — C61 PROSTATE CANCER: ICD-10-CM

## 2021-10-04 PROCEDURE — 3077F SYST BP >= 140 MM HG: CPT | Mod: CPTII,S$GLB,, | Performed by: STUDENT IN AN ORGANIZED HEALTH CARE EDUCATION/TRAINING PROGRAM

## 2021-10-04 PROCEDURE — 1101F PT FALLS ASSESS-DOCD LE1/YR: CPT | Mod: CPTII,S$GLB,, | Performed by: STUDENT IN AN ORGANIZED HEALTH CARE EDUCATION/TRAINING PROGRAM

## 2021-10-04 PROCEDURE — 1160F RVW MEDS BY RX/DR IN RCRD: CPT | Mod: CPTII,S$GLB,, | Performed by: STUDENT IN AN ORGANIZED HEALTH CARE EDUCATION/TRAINING PROGRAM

## 2021-10-04 PROCEDURE — 1159F MED LIST DOCD IN RCRD: CPT | Mod: CPTII,S$GLB,, | Performed by: STUDENT IN AN ORGANIZED HEALTH CARE EDUCATION/TRAINING PROGRAM

## 2021-10-04 PROCEDURE — 3288F FALL RISK ASSESSMENT DOCD: CPT | Mod: CPTII,S$GLB,, | Performed by: STUDENT IN AN ORGANIZED HEALTH CARE EDUCATION/TRAINING PROGRAM

## 2021-10-04 PROCEDURE — 3044F HG A1C LEVEL LT 7.0%: CPT | Mod: CPTII,S$GLB,, | Performed by: STUDENT IN AN ORGANIZED HEALTH CARE EDUCATION/TRAINING PROGRAM

## 2021-10-04 PROCEDURE — 1159F PR MEDICATION LIST DOCUMENTED IN MEDICAL RECORD: ICD-10-PCS | Mod: CPTII,S$GLB,, | Performed by: STUDENT IN AN ORGANIZED HEALTH CARE EDUCATION/TRAINING PROGRAM

## 2021-10-04 PROCEDURE — 3066F PR DOCUMENTATION OF TREATMENT FOR NEPHROPATHY: ICD-10-PCS | Mod: CPTII,S$GLB,, | Performed by: STUDENT IN AN ORGANIZED HEALTH CARE EDUCATION/TRAINING PROGRAM

## 2021-10-04 PROCEDURE — 99205 PR OFFICE/OUTPT VISIT, NEW, LEVL V, 60-74 MIN: ICD-10-PCS | Mod: S$GLB,,, | Performed by: STUDENT IN AN ORGANIZED HEALTH CARE EDUCATION/TRAINING PROGRAM

## 2021-10-04 PROCEDURE — 3044F PR MOST RECENT HEMOGLOBIN A1C LEVEL <7.0%: ICD-10-PCS | Mod: CPTII,S$GLB,, | Performed by: STUDENT IN AN ORGANIZED HEALTH CARE EDUCATION/TRAINING PROGRAM

## 2021-10-04 PROCEDURE — 99999 PR PBB SHADOW E&M-EST. PATIENT-LVL IV: ICD-10-PCS | Mod: PBBFAC,,, | Performed by: STUDENT IN AN ORGANIZED HEALTH CARE EDUCATION/TRAINING PROGRAM

## 2021-10-04 PROCEDURE — 1126F PR PAIN SEVERITY QUANTIFIED, NO PAIN PRESENT: ICD-10-PCS | Mod: CPTII,S$GLB,, | Performed by: STUDENT IN AN ORGANIZED HEALTH CARE EDUCATION/TRAINING PROGRAM

## 2021-10-04 PROCEDURE — 99205 OFFICE O/P NEW HI 60 MIN: CPT | Mod: S$GLB,,, | Performed by: STUDENT IN AN ORGANIZED HEALTH CARE EDUCATION/TRAINING PROGRAM

## 2021-10-04 PROCEDURE — 99999 PR PBB SHADOW E&M-EST. PATIENT-LVL IV: CPT | Mod: PBBFAC,,, | Performed by: STUDENT IN AN ORGANIZED HEALTH CARE EDUCATION/TRAINING PROGRAM

## 2021-10-04 PROCEDURE — 3008F PR BODY MASS INDEX (BMI) DOCUMENTED: ICD-10-PCS | Mod: CPTII,S$GLB,, | Performed by: STUDENT IN AN ORGANIZED HEALTH CARE EDUCATION/TRAINING PROGRAM

## 2021-10-04 PROCEDURE — 3008F BODY MASS INDEX DOCD: CPT | Mod: CPTII,S$GLB,, | Performed by: STUDENT IN AN ORGANIZED HEALTH CARE EDUCATION/TRAINING PROGRAM

## 2021-10-04 PROCEDURE — 1126F AMNT PAIN NOTED NONE PRSNT: CPT | Mod: CPTII,S$GLB,, | Performed by: STUDENT IN AN ORGANIZED HEALTH CARE EDUCATION/TRAINING PROGRAM

## 2021-10-04 PROCEDURE — 3066F NEPHROPATHY DOC TX: CPT | Mod: CPTII,S$GLB,, | Performed by: STUDENT IN AN ORGANIZED HEALTH CARE EDUCATION/TRAINING PROGRAM

## 2021-10-04 PROCEDURE — 1101F PR PT FALLS ASSESS DOC 0-1 FALLS W/OUT INJ PAST YR: ICD-10-PCS | Mod: CPTII,S$GLB,, | Performed by: STUDENT IN AN ORGANIZED HEALTH CARE EDUCATION/TRAINING PROGRAM

## 2021-10-04 PROCEDURE — 4010F PR ACE/ARB THEARPY RXD/TAKEN: ICD-10-PCS | Mod: CPTII,S$GLB,, | Performed by: STUDENT IN AN ORGANIZED HEALTH CARE EDUCATION/TRAINING PROGRAM

## 2021-10-04 PROCEDURE — 3061F PR NEG MICROALBUMINURIA RESULT DOCUMENTED/REVIEW: ICD-10-PCS | Mod: CPTII,S$GLB,, | Performed by: STUDENT IN AN ORGANIZED HEALTH CARE EDUCATION/TRAINING PROGRAM

## 2021-10-04 PROCEDURE — 3288F PR FALLS RISK ASSESSMENT DOCUMENTED: ICD-10-PCS | Mod: CPTII,S$GLB,, | Performed by: STUDENT IN AN ORGANIZED HEALTH CARE EDUCATION/TRAINING PROGRAM

## 2021-10-04 PROCEDURE — 4010F ACE/ARB THERAPY RXD/TAKEN: CPT | Mod: CPTII,S$GLB,, | Performed by: STUDENT IN AN ORGANIZED HEALTH CARE EDUCATION/TRAINING PROGRAM

## 2021-10-04 PROCEDURE — 3078F DIAST BP <80 MM HG: CPT | Mod: CPTII,S$GLB,, | Performed by: STUDENT IN AN ORGANIZED HEALTH CARE EDUCATION/TRAINING PROGRAM

## 2021-10-04 PROCEDURE — 3078F PR MOST RECENT DIASTOLIC BLOOD PRESSURE < 80 MM HG: ICD-10-PCS | Mod: CPTII,S$GLB,, | Performed by: STUDENT IN AN ORGANIZED HEALTH CARE EDUCATION/TRAINING PROGRAM

## 2021-10-04 PROCEDURE — 3061F NEG MICROALBUMINURIA REV: CPT | Mod: CPTII,S$GLB,, | Performed by: STUDENT IN AN ORGANIZED HEALTH CARE EDUCATION/TRAINING PROGRAM

## 2021-10-04 PROCEDURE — 1160F PR REVIEW ALL MEDS BY PRESCRIBER/CLIN PHARMACIST DOCUMENTED: ICD-10-PCS | Mod: CPTII,S$GLB,, | Performed by: STUDENT IN AN ORGANIZED HEALTH CARE EDUCATION/TRAINING PROGRAM

## 2021-10-04 PROCEDURE — 3077F PR MOST RECENT SYSTOLIC BLOOD PRESSURE >= 140 MM HG: ICD-10-PCS | Mod: CPTII,S$GLB,, | Performed by: STUDENT IN AN ORGANIZED HEALTH CARE EDUCATION/TRAINING PROGRAM

## 2021-10-08 ENCOUNTER — DOCUMENTATION ONLY (OUTPATIENT)
Dept: HEMATOLOGY/ONCOLOGY | Facility: CLINIC | Age: 71
End: 2021-10-08
Payer: MEDICARE

## 2021-10-26 RX ORDER — ATORVASTATIN CALCIUM 10 MG/1
TABLET, FILM COATED ORAL
Qty: 45 TABLET | Refills: 0 | Status: SHIPPED | OUTPATIENT
Start: 2021-10-26 | End: 2022-03-16

## 2021-10-28 ENCOUNTER — DOCUMENTATION ONLY (OUTPATIENT)
Dept: RADIATION ONCOLOGY | Facility: CLINIC | Age: 71
End: 2021-10-28
Payer: MEDICARE

## 2021-10-29 LAB
FINAL PATHOLOGIC DIAGNOSIS: NORMAL
GROSS: NORMAL
Lab: NORMAL
SUPPLEMENTAL DIAGNOSIS: NORMAL

## 2021-11-05 ENCOUNTER — HOSPITAL ENCOUNTER (OUTPATIENT)
Dept: RADIATION THERAPY | Facility: HOSPITAL | Age: 71
Discharge: HOME | End: 2021-11-05
Attending: STUDENT IN AN ORGANIZED HEALTH CARE EDUCATION/TRAINING PROGRAM
Payer: MEDICARE

## 2021-11-05 PROCEDURE — 77334 RADIATION TREATMENT AID(S): CPT | Mod: TC | Performed by: STUDENT IN AN ORGANIZED HEALTH CARE EDUCATION/TRAINING PROGRAM

## 2021-11-05 PROCEDURE — 77334 RADIATION TREATMENT AID(S): CPT | Mod: 26,,, | Performed by: STUDENT IN AN ORGANIZED HEALTH CARE EDUCATION/TRAINING PROGRAM

## 2021-11-05 PROCEDURE — 77014 PR  CT GUIDANCE PLACEMENT RAD THERAPY FIELDS: CPT | Mod: 26,,, | Performed by: STUDENT IN AN ORGANIZED HEALTH CARE EDUCATION/TRAINING PROGRAM

## 2021-11-05 PROCEDURE — 77263 PR  RADIATION THERAPY PLAN COMPLEX: ICD-10-PCS | Mod: ,,, | Performed by: STUDENT IN AN ORGANIZED HEALTH CARE EDUCATION/TRAINING PROGRAM

## 2021-11-05 PROCEDURE — 77014 HC CT GUIDANCE RADIATION THERAPY FLDS PLACEMENT: CPT | Mod: TC | Performed by: STUDENT IN AN ORGANIZED HEALTH CARE EDUCATION/TRAINING PROGRAM

## 2021-11-05 PROCEDURE — 77334 PR  RADN TREATMENT AID(S) COMPLX: ICD-10-PCS | Mod: 26,,, | Performed by: STUDENT IN AN ORGANIZED HEALTH CARE EDUCATION/TRAINING PROGRAM

## 2021-11-05 PROCEDURE — 77263 THER RADIOLOGY TX PLNG CPLX: CPT | Mod: ,,, | Performed by: STUDENT IN AN ORGANIZED HEALTH CARE EDUCATION/TRAINING PROGRAM

## 2021-11-05 PROCEDURE — 77014 PR  CT GUIDANCE PLACEMENT RAD THERAPY FIELDS: ICD-10-PCS | Mod: 26,,, | Performed by: STUDENT IN AN ORGANIZED HEALTH CARE EDUCATION/TRAINING PROGRAM

## 2021-11-18 PROCEDURE — 77301 RADIOTHERAPY DOSE PLAN IMRT: CPT | Mod: 26,,, | Performed by: STUDENT IN AN ORGANIZED HEALTH CARE EDUCATION/TRAINING PROGRAM

## 2021-11-18 PROCEDURE — 77301 RADIOTHERAPY DOSE PLAN IMRT: CPT | Mod: TC | Performed by: STUDENT IN AN ORGANIZED HEALTH CARE EDUCATION/TRAINING PROGRAM

## 2021-11-18 PROCEDURE — 77301 PR  INTEN MOD RADIOTHER PLAN W/DOSE VOL HIST: ICD-10-PCS | Mod: 26,,, | Performed by: STUDENT IN AN ORGANIZED HEALTH CARE EDUCATION/TRAINING PROGRAM

## 2021-11-19 PROCEDURE — 77300 RADIATION THERAPY DOSE PLAN: CPT | Mod: 26,,, | Performed by: STUDENT IN AN ORGANIZED HEALTH CARE EDUCATION/TRAINING PROGRAM

## 2021-11-19 PROCEDURE — 77300 RADIATION THERAPY DOSE PLAN: CPT | Mod: TC | Performed by: STUDENT IN AN ORGANIZED HEALTH CARE EDUCATION/TRAINING PROGRAM

## 2021-11-19 PROCEDURE — 77338 DESIGN MLC DEVICE FOR IMRT: CPT | Mod: 26,,, | Performed by: STUDENT IN AN ORGANIZED HEALTH CARE EDUCATION/TRAINING PROGRAM

## 2021-11-19 PROCEDURE — 77338 DESIGN MLC DEVICE FOR IMRT: CPT | Mod: TC | Performed by: STUDENT IN AN ORGANIZED HEALTH CARE EDUCATION/TRAINING PROGRAM

## 2021-11-19 PROCEDURE — 77300 PR RADIATION THERAPY,DOSIMETRY PLAN: ICD-10-PCS | Mod: 26,,, | Performed by: STUDENT IN AN ORGANIZED HEALTH CARE EDUCATION/TRAINING PROGRAM

## 2021-11-19 PROCEDURE — 77338 PR  MLC IMRT DESIGN & CONSTRUCTION PER IMRT PLAN: ICD-10-PCS | Mod: 26,,, | Performed by: STUDENT IN AN ORGANIZED HEALTH CARE EDUCATION/TRAINING PROGRAM

## 2021-11-22 PROCEDURE — 77014 PR  CT GUIDANCE PLACEMENT RAD THERAPY FIELDS: CPT | Mod: 26,,, | Performed by: STUDENT IN AN ORGANIZED HEALTH CARE EDUCATION/TRAINING PROGRAM

## 2021-11-22 PROCEDURE — 77014 HC CT GUIDANCE RADIATION THERAPY FLDS PLACEMENT: CPT | Mod: TC | Performed by: STUDENT IN AN ORGANIZED HEALTH CARE EDUCATION/TRAINING PROGRAM

## 2021-11-22 PROCEDURE — 77385 HC IMRT, SIMPLE: CPT | Performed by: STUDENT IN AN ORGANIZED HEALTH CARE EDUCATION/TRAINING PROGRAM

## 2021-11-22 PROCEDURE — 77014 PR  CT GUIDANCE PLACEMENT RAD THERAPY FIELDS: ICD-10-PCS | Mod: 26,,, | Performed by: STUDENT IN AN ORGANIZED HEALTH CARE EDUCATION/TRAINING PROGRAM

## 2021-11-23 PROCEDURE — 77014 PR  CT GUIDANCE PLACEMENT RAD THERAPY FIELDS: ICD-10-PCS | Mod: 26,,, | Performed by: STUDENT IN AN ORGANIZED HEALTH CARE EDUCATION/TRAINING PROGRAM

## 2021-11-23 PROCEDURE — 77014 PR  CT GUIDANCE PLACEMENT RAD THERAPY FIELDS: CPT | Mod: 26,,, | Performed by: STUDENT IN AN ORGANIZED HEALTH CARE EDUCATION/TRAINING PROGRAM

## 2021-11-23 PROCEDURE — 77014 HC CT GUIDANCE RADIATION THERAPY FLDS PLACEMENT: CPT | Mod: TC | Performed by: STUDENT IN AN ORGANIZED HEALTH CARE EDUCATION/TRAINING PROGRAM

## 2021-11-23 PROCEDURE — 77385 HC IMRT, SIMPLE: CPT | Performed by: STUDENT IN AN ORGANIZED HEALTH CARE EDUCATION/TRAINING PROGRAM

## 2021-11-24 ENCOUNTER — DOCUMENTATION ONLY (OUTPATIENT)
Dept: RADIATION ONCOLOGY | Facility: CLINIC | Age: 71
End: 2021-11-24
Payer: MEDICARE

## 2021-11-24 PROCEDURE — 77014 PR  CT GUIDANCE PLACEMENT RAD THERAPY FIELDS: ICD-10-PCS | Mod: 26,,, | Performed by: STUDENT IN AN ORGANIZED HEALTH CARE EDUCATION/TRAINING PROGRAM

## 2021-11-24 PROCEDURE — 77385 HC IMRT, SIMPLE: CPT | Performed by: STUDENT IN AN ORGANIZED HEALTH CARE EDUCATION/TRAINING PROGRAM

## 2021-11-24 PROCEDURE — 77014 PR  CT GUIDANCE PLACEMENT RAD THERAPY FIELDS: CPT | Mod: 26,,, | Performed by: STUDENT IN AN ORGANIZED HEALTH CARE EDUCATION/TRAINING PROGRAM

## 2021-11-24 PROCEDURE — 77014 HC CT GUIDANCE RADIATION THERAPY FLDS PLACEMENT: CPT | Mod: TC | Performed by: STUDENT IN AN ORGANIZED HEALTH CARE EDUCATION/TRAINING PROGRAM

## 2021-11-29 PROCEDURE — 77014 HC CT GUIDANCE RADIATION THERAPY FLDS PLACEMENT: CPT | Mod: TC | Performed by: STUDENT IN AN ORGANIZED HEALTH CARE EDUCATION/TRAINING PROGRAM

## 2021-11-29 PROCEDURE — 77014 PR  CT GUIDANCE PLACEMENT RAD THERAPY FIELDS: ICD-10-PCS | Mod: 26,,, | Performed by: STUDENT IN AN ORGANIZED HEALTH CARE EDUCATION/TRAINING PROGRAM

## 2021-11-29 PROCEDURE — 77385 HC IMRT, SIMPLE: CPT | Performed by: STUDENT IN AN ORGANIZED HEALTH CARE EDUCATION/TRAINING PROGRAM

## 2021-11-29 PROCEDURE — 77014 PR  CT GUIDANCE PLACEMENT RAD THERAPY FIELDS: CPT | Mod: 26,,, | Performed by: STUDENT IN AN ORGANIZED HEALTH CARE EDUCATION/TRAINING PROGRAM

## 2021-11-30 PROCEDURE — 77014 PR  CT GUIDANCE PLACEMENT RAD THERAPY FIELDS: CPT | Mod: 26,,, | Performed by: STUDENT IN AN ORGANIZED HEALTH CARE EDUCATION/TRAINING PROGRAM

## 2021-11-30 PROCEDURE — 77336 RADIATION PHYSICS CONSULT: CPT | Performed by: STUDENT IN AN ORGANIZED HEALTH CARE EDUCATION/TRAINING PROGRAM

## 2021-11-30 PROCEDURE — 77014 PR  CT GUIDANCE PLACEMENT RAD THERAPY FIELDS: ICD-10-PCS | Mod: 26,,, | Performed by: STUDENT IN AN ORGANIZED HEALTH CARE EDUCATION/TRAINING PROGRAM

## 2021-11-30 PROCEDURE — 77385 HC IMRT, SIMPLE: CPT | Performed by: STUDENT IN AN ORGANIZED HEALTH CARE EDUCATION/TRAINING PROGRAM

## 2021-11-30 PROCEDURE — 77014 HC CT GUIDANCE RADIATION THERAPY FLDS PLACEMENT: CPT | Mod: TC | Performed by: STUDENT IN AN ORGANIZED HEALTH CARE EDUCATION/TRAINING PROGRAM

## 2021-12-01 ENCOUNTER — HOSPITAL ENCOUNTER (OUTPATIENT)
Dept: RADIATION THERAPY | Facility: HOSPITAL | Age: 71
Discharge: HOME | End: 2021-12-01
Attending: STUDENT IN AN ORGANIZED HEALTH CARE EDUCATION/TRAINING PROGRAM
Payer: MEDICARE

## 2021-12-01 PROCEDURE — 77014 PR  CT GUIDANCE PLACEMENT RAD THERAPY FIELDS: CPT | Mod: 26,,, | Performed by: STUDENT IN AN ORGANIZED HEALTH CARE EDUCATION/TRAINING PROGRAM

## 2021-12-01 PROCEDURE — 77014 PR  CT GUIDANCE PLACEMENT RAD THERAPY FIELDS: ICD-10-PCS | Mod: 26,,, | Performed by: STUDENT IN AN ORGANIZED HEALTH CARE EDUCATION/TRAINING PROGRAM

## 2021-12-01 PROCEDURE — 77385 HC IMRT, SIMPLE: CPT | Performed by: STUDENT IN AN ORGANIZED HEALTH CARE EDUCATION/TRAINING PROGRAM

## 2021-12-01 PROCEDURE — 77014 HC CT GUIDANCE RADIATION THERAPY FLDS PLACEMENT: CPT | Mod: TC | Performed by: STUDENT IN AN ORGANIZED HEALTH CARE EDUCATION/TRAINING PROGRAM

## 2021-12-02 PROCEDURE — 77385 HC IMRT, SIMPLE: CPT | Performed by: STUDENT IN AN ORGANIZED HEALTH CARE EDUCATION/TRAINING PROGRAM

## 2021-12-02 PROCEDURE — 77014 PR  CT GUIDANCE PLACEMENT RAD THERAPY FIELDS: CPT | Mod: 26,,, | Performed by: STUDENT IN AN ORGANIZED HEALTH CARE EDUCATION/TRAINING PROGRAM

## 2021-12-02 PROCEDURE — 77014 HC CT GUIDANCE RADIATION THERAPY FLDS PLACEMENT: CPT | Mod: TC | Performed by: STUDENT IN AN ORGANIZED HEALTH CARE EDUCATION/TRAINING PROGRAM

## 2021-12-02 PROCEDURE — 77014 PR  CT GUIDANCE PLACEMENT RAD THERAPY FIELDS: ICD-10-PCS | Mod: 26,,, | Performed by: STUDENT IN AN ORGANIZED HEALTH CARE EDUCATION/TRAINING PROGRAM

## 2021-12-03 ENCOUNTER — DOCUMENTATION ONLY (OUTPATIENT)
Dept: RADIATION ONCOLOGY | Facility: CLINIC | Age: 71
End: 2021-12-03
Payer: MEDICARE

## 2021-12-03 PROCEDURE — 77014 PR  CT GUIDANCE PLACEMENT RAD THERAPY FIELDS: CPT | Mod: 26,,, | Performed by: STUDENT IN AN ORGANIZED HEALTH CARE EDUCATION/TRAINING PROGRAM

## 2021-12-03 PROCEDURE — 77014 HC CT GUIDANCE RADIATION THERAPY FLDS PLACEMENT: CPT | Mod: TC | Performed by: STUDENT IN AN ORGANIZED HEALTH CARE EDUCATION/TRAINING PROGRAM

## 2021-12-03 PROCEDURE — 77014 PR  CT GUIDANCE PLACEMENT RAD THERAPY FIELDS: ICD-10-PCS | Mod: 26,,, | Performed by: STUDENT IN AN ORGANIZED HEALTH CARE EDUCATION/TRAINING PROGRAM

## 2021-12-03 PROCEDURE — 77385 HC IMRT, SIMPLE: CPT | Performed by: STUDENT IN AN ORGANIZED HEALTH CARE EDUCATION/TRAINING PROGRAM

## 2021-12-06 PROCEDURE — 77014 PR  CT GUIDANCE PLACEMENT RAD THERAPY FIELDS: CPT | Mod: 26,,, | Performed by: STUDENT IN AN ORGANIZED HEALTH CARE EDUCATION/TRAINING PROGRAM

## 2021-12-06 PROCEDURE — 77385 HC IMRT, SIMPLE: CPT | Performed by: STUDENT IN AN ORGANIZED HEALTH CARE EDUCATION/TRAINING PROGRAM

## 2021-12-06 PROCEDURE — 77014 PR  CT GUIDANCE PLACEMENT RAD THERAPY FIELDS: ICD-10-PCS | Mod: 26,,, | Performed by: STUDENT IN AN ORGANIZED HEALTH CARE EDUCATION/TRAINING PROGRAM

## 2021-12-06 PROCEDURE — 77014 HC CT GUIDANCE RADIATION THERAPY FLDS PLACEMENT: CPT | Mod: TC | Performed by: STUDENT IN AN ORGANIZED HEALTH CARE EDUCATION/TRAINING PROGRAM

## 2021-12-07 PROCEDURE — 77014 HC CT GUIDANCE RADIATION THERAPY FLDS PLACEMENT: CPT | Mod: TC | Performed by: STUDENT IN AN ORGANIZED HEALTH CARE EDUCATION/TRAINING PROGRAM

## 2021-12-07 PROCEDURE — 77014 PR  CT GUIDANCE PLACEMENT RAD THERAPY FIELDS: ICD-10-PCS | Mod: 26,,, | Performed by: STUDENT IN AN ORGANIZED HEALTH CARE EDUCATION/TRAINING PROGRAM

## 2021-12-07 PROCEDURE — 77336 RADIATION PHYSICS CONSULT: CPT | Performed by: STUDENT IN AN ORGANIZED HEALTH CARE EDUCATION/TRAINING PROGRAM

## 2021-12-07 PROCEDURE — 77014 PR  CT GUIDANCE PLACEMENT RAD THERAPY FIELDS: CPT | Mod: 26,,, | Performed by: STUDENT IN AN ORGANIZED HEALTH CARE EDUCATION/TRAINING PROGRAM

## 2021-12-07 PROCEDURE — 77385 HC IMRT, SIMPLE: CPT | Performed by: STUDENT IN AN ORGANIZED HEALTH CARE EDUCATION/TRAINING PROGRAM

## 2021-12-08 PROCEDURE — 77014 PR  CT GUIDANCE PLACEMENT RAD THERAPY FIELDS: CPT | Mod: 26,,, | Performed by: STUDENT IN AN ORGANIZED HEALTH CARE EDUCATION/TRAINING PROGRAM

## 2021-12-08 PROCEDURE — 77014 HC CT GUIDANCE RADIATION THERAPY FLDS PLACEMENT: CPT | Mod: TC | Performed by: STUDENT IN AN ORGANIZED HEALTH CARE EDUCATION/TRAINING PROGRAM

## 2021-12-08 PROCEDURE — 77385 HC IMRT, SIMPLE: CPT | Performed by: STUDENT IN AN ORGANIZED HEALTH CARE EDUCATION/TRAINING PROGRAM

## 2021-12-08 PROCEDURE — 77014 PR  CT GUIDANCE PLACEMENT RAD THERAPY FIELDS: ICD-10-PCS | Mod: 26,,, | Performed by: STUDENT IN AN ORGANIZED HEALTH CARE EDUCATION/TRAINING PROGRAM

## 2021-12-09 PROCEDURE — 77014 PR  CT GUIDANCE PLACEMENT RAD THERAPY FIELDS: ICD-10-PCS | Mod: 26,,, | Performed by: STUDENT IN AN ORGANIZED HEALTH CARE EDUCATION/TRAINING PROGRAM

## 2021-12-09 PROCEDURE — 77014 PR  CT GUIDANCE PLACEMENT RAD THERAPY FIELDS: CPT | Mod: 26,,, | Performed by: STUDENT IN AN ORGANIZED HEALTH CARE EDUCATION/TRAINING PROGRAM

## 2021-12-09 PROCEDURE — 77385 HC IMRT, SIMPLE: CPT | Performed by: STUDENT IN AN ORGANIZED HEALTH CARE EDUCATION/TRAINING PROGRAM

## 2021-12-09 PROCEDURE — 77014 HC CT GUIDANCE RADIATION THERAPY FLDS PLACEMENT: CPT | Mod: TC | Performed by: STUDENT IN AN ORGANIZED HEALTH CARE EDUCATION/TRAINING PROGRAM

## 2021-12-10 ENCOUNTER — DOCUMENTATION ONLY (OUTPATIENT)
Dept: RADIATION ONCOLOGY | Facility: CLINIC | Age: 71
End: 2021-12-10
Payer: MEDICARE

## 2021-12-10 DIAGNOSIS — C61 PROSTATE CANCER: Primary | ICD-10-CM

## 2021-12-10 PROCEDURE — 77014 PR  CT GUIDANCE PLACEMENT RAD THERAPY FIELDS: CPT | Mod: 26,,, | Performed by: STUDENT IN AN ORGANIZED HEALTH CARE EDUCATION/TRAINING PROGRAM

## 2021-12-10 PROCEDURE — 77014 HC CT GUIDANCE RADIATION THERAPY FLDS PLACEMENT: CPT | Mod: TC | Performed by: STUDENT IN AN ORGANIZED HEALTH CARE EDUCATION/TRAINING PROGRAM

## 2021-12-10 PROCEDURE — 77014 PR  CT GUIDANCE PLACEMENT RAD THERAPY FIELDS: ICD-10-PCS | Mod: 26,,, | Performed by: STUDENT IN AN ORGANIZED HEALTH CARE EDUCATION/TRAINING PROGRAM

## 2021-12-10 PROCEDURE — 77385 HC IMRT, SIMPLE: CPT | Performed by: STUDENT IN AN ORGANIZED HEALTH CARE EDUCATION/TRAINING PROGRAM

## 2021-12-10 RX ORDER — IBUPROFEN 600 MG/1
600 TABLET ORAL DAILY PRN
Qty: 30 TABLET | Refills: 0 | Status: SHIPPED | OUTPATIENT
Start: 2021-12-10 | End: 2022-01-09

## 2021-12-13 PROCEDURE — 77385 HC IMRT, SIMPLE: CPT | Performed by: STUDENT IN AN ORGANIZED HEALTH CARE EDUCATION/TRAINING PROGRAM

## 2021-12-13 PROCEDURE — 77014 HC CT GUIDANCE RADIATION THERAPY FLDS PLACEMENT: CPT | Mod: TC | Performed by: STUDENT IN AN ORGANIZED HEALTH CARE EDUCATION/TRAINING PROGRAM

## 2021-12-13 PROCEDURE — 77014 PR  CT GUIDANCE PLACEMENT RAD THERAPY FIELDS: ICD-10-PCS | Mod: 26,,, | Performed by: STUDENT IN AN ORGANIZED HEALTH CARE EDUCATION/TRAINING PROGRAM

## 2021-12-13 PROCEDURE — 77014 PR  CT GUIDANCE PLACEMENT RAD THERAPY FIELDS: CPT | Mod: 26,,, | Performed by: STUDENT IN AN ORGANIZED HEALTH CARE EDUCATION/TRAINING PROGRAM

## 2021-12-14 PROCEDURE — 77385 HC IMRT, SIMPLE: CPT | Performed by: STUDENT IN AN ORGANIZED HEALTH CARE EDUCATION/TRAINING PROGRAM

## 2021-12-14 PROCEDURE — 77336 RADIATION PHYSICS CONSULT: CPT | Performed by: STUDENT IN AN ORGANIZED HEALTH CARE EDUCATION/TRAINING PROGRAM

## 2021-12-14 PROCEDURE — 77014 HC CT GUIDANCE RADIATION THERAPY FLDS PLACEMENT: CPT | Mod: TC | Performed by: STUDENT IN AN ORGANIZED HEALTH CARE EDUCATION/TRAINING PROGRAM

## 2021-12-14 PROCEDURE — 77014 PR  CT GUIDANCE PLACEMENT RAD THERAPY FIELDS: CPT | Mod: 26,,, | Performed by: STUDENT IN AN ORGANIZED HEALTH CARE EDUCATION/TRAINING PROGRAM

## 2021-12-14 PROCEDURE — 77014 PR  CT GUIDANCE PLACEMENT RAD THERAPY FIELDS: ICD-10-PCS | Mod: 26,,, | Performed by: STUDENT IN AN ORGANIZED HEALTH CARE EDUCATION/TRAINING PROGRAM

## 2021-12-15 ENCOUNTER — TELEPHONE (OUTPATIENT)
Dept: RADIATION ONCOLOGY | Facility: CLINIC | Age: 71
End: 2021-12-15
Payer: MEDICARE

## 2021-12-15 PROCEDURE — 77014 HC CT GUIDANCE RADIATION THERAPY FLDS PLACEMENT: CPT | Mod: TC | Performed by: STUDENT IN AN ORGANIZED HEALTH CARE EDUCATION/TRAINING PROGRAM

## 2021-12-15 PROCEDURE — 77014 PR  CT GUIDANCE PLACEMENT RAD THERAPY FIELDS: CPT | Mod: 26,,, | Performed by: STUDENT IN AN ORGANIZED HEALTH CARE EDUCATION/TRAINING PROGRAM

## 2021-12-15 PROCEDURE — 77385 HC IMRT, SIMPLE: CPT | Performed by: STUDENT IN AN ORGANIZED HEALTH CARE EDUCATION/TRAINING PROGRAM

## 2021-12-15 PROCEDURE — 77014 PR  CT GUIDANCE PLACEMENT RAD THERAPY FIELDS: ICD-10-PCS | Mod: 26,,, | Performed by: STUDENT IN AN ORGANIZED HEALTH CARE EDUCATION/TRAINING PROGRAM

## 2021-12-16 PROCEDURE — 77014 PR  CT GUIDANCE PLACEMENT RAD THERAPY FIELDS: ICD-10-PCS | Mod: 26,,, | Performed by: STUDENT IN AN ORGANIZED HEALTH CARE EDUCATION/TRAINING PROGRAM

## 2021-12-16 PROCEDURE — 77014 HC CT GUIDANCE RADIATION THERAPY FLDS PLACEMENT: CPT | Mod: TC | Performed by: STUDENT IN AN ORGANIZED HEALTH CARE EDUCATION/TRAINING PROGRAM

## 2021-12-16 PROCEDURE — 77385 HC IMRT, SIMPLE: CPT | Performed by: STUDENT IN AN ORGANIZED HEALTH CARE EDUCATION/TRAINING PROGRAM

## 2021-12-16 PROCEDURE — 77014 PR  CT GUIDANCE PLACEMENT RAD THERAPY FIELDS: CPT | Mod: 26,,, | Performed by: STUDENT IN AN ORGANIZED HEALTH CARE EDUCATION/TRAINING PROGRAM

## 2021-12-17 ENCOUNTER — DOCUMENTATION ONLY (OUTPATIENT)
Dept: RADIATION ONCOLOGY | Facility: CLINIC | Age: 71
End: 2021-12-17
Payer: MEDICARE

## 2021-12-17 PROCEDURE — 77014 HC CT GUIDANCE RADIATION THERAPY FLDS PLACEMENT: CPT | Mod: TC | Performed by: STUDENT IN AN ORGANIZED HEALTH CARE EDUCATION/TRAINING PROGRAM

## 2021-12-17 PROCEDURE — 77014 PR  CT GUIDANCE PLACEMENT RAD THERAPY FIELDS: ICD-10-PCS | Mod: 26,,, | Performed by: STUDENT IN AN ORGANIZED HEALTH CARE EDUCATION/TRAINING PROGRAM

## 2021-12-17 PROCEDURE — 77385 HC IMRT, SIMPLE: CPT | Performed by: STUDENT IN AN ORGANIZED HEALTH CARE EDUCATION/TRAINING PROGRAM

## 2021-12-17 PROCEDURE — 77014 PR  CT GUIDANCE PLACEMENT RAD THERAPY FIELDS: CPT | Mod: 26,,, | Performed by: STUDENT IN AN ORGANIZED HEALTH CARE EDUCATION/TRAINING PROGRAM

## 2021-12-19 DIAGNOSIS — E11.9 TYPE 2 DIABETES MELLITUS WITHOUT COMPLICATION, UNSPECIFIED WHETHER LONG TERM INSULIN USE: Primary | ICD-10-CM

## 2021-12-20 PROCEDURE — 77014 HC CT GUIDANCE RADIATION THERAPY FLDS PLACEMENT: CPT | Mod: TC | Performed by: STUDENT IN AN ORGANIZED HEALTH CARE EDUCATION/TRAINING PROGRAM

## 2021-12-20 PROCEDURE — 77385 HC IMRT, SIMPLE: CPT | Performed by: STUDENT IN AN ORGANIZED HEALTH CARE EDUCATION/TRAINING PROGRAM

## 2021-12-20 PROCEDURE — 77014 PR  CT GUIDANCE PLACEMENT RAD THERAPY FIELDS: CPT | Mod: 26,,, | Performed by: STUDENT IN AN ORGANIZED HEALTH CARE EDUCATION/TRAINING PROGRAM

## 2021-12-20 PROCEDURE — 77014 PR  CT GUIDANCE PLACEMENT RAD THERAPY FIELDS: ICD-10-PCS | Mod: 26,,, | Performed by: STUDENT IN AN ORGANIZED HEALTH CARE EDUCATION/TRAINING PROGRAM

## 2021-12-20 RX ORDER — METFORMIN HYDROCHLORIDE 500 MG/1
TABLET ORAL
Qty: 180 TABLET | Refills: 0 | Status: SHIPPED | OUTPATIENT
Start: 2021-12-20 | End: 2022-06-28 | Stop reason: SDUPTHER

## 2021-12-21 PROCEDURE — 77014 HC CT GUIDANCE RADIATION THERAPY FLDS PLACEMENT: CPT | Mod: TC | Performed by: STUDENT IN AN ORGANIZED HEALTH CARE EDUCATION/TRAINING PROGRAM

## 2021-12-21 PROCEDURE — 77014 PR  CT GUIDANCE PLACEMENT RAD THERAPY FIELDS: ICD-10-PCS | Mod: 26,,, | Performed by: STUDENT IN AN ORGANIZED HEALTH CARE EDUCATION/TRAINING PROGRAM

## 2021-12-21 PROCEDURE — 77336 RADIATION PHYSICS CONSULT: CPT | Performed by: STUDENT IN AN ORGANIZED HEALTH CARE EDUCATION/TRAINING PROGRAM

## 2021-12-21 PROCEDURE — 77385 HC IMRT, SIMPLE: CPT | Performed by: STUDENT IN AN ORGANIZED HEALTH CARE EDUCATION/TRAINING PROGRAM

## 2021-12-21 PROCEDURE — 77014 PR  CT GUIDANCE PLACEMENT RAD THERAPY FIELDS: CPT | Mod: 26,,, | Performed by: STUDENT IN AN ORGANIZED HEALTH CARE EDUCATION/TRAINING PROGRAM

## 2021-12-22 ENCOUNTER — DOCUMENTATION ONLY (OUTPATIENT)
Dept: RADIATION ONCOLOGY | Facility: CLINIC | Age: 71
End: 2021-12-22
Payer: MEDICARE

## 2021-12-22 PROCEDURE — 77385 HC IMRT, SIMPLE: CPT | Performed by: STUDENT IN AN ORGANIZED HEALTH CARE EDUCATION/TRAINING PROGRAM

## 2021-12-22 PROCEDURE — 77014 PR  CT GUIDANCE PLACEMENT RAD THERAPY FIELDS: CPT | Mod: 26,,, | Performed by: STUDENT IN AN ORGANIZED HEALTH CARE EDUCATION/TRAINING PROGRAM

## 2021-12-22 PROCEDURE — 77014 HC CT GUIDANCE RADIATION THERAPY FLDS PLACEMENT: CPT | Mod: TC | Performed by: STUDENT IN AN ORGANIZED HEALTH CARE EDUCATION/TRAINING PROGRAM

## 2021-12-22 PROCEDURE — 77014 PR  CT GUIDANCE PLACEMENT RAD THERAPY FIELDS: ICD-10-PCS | Mod: 26,,, | Performed by: STUDENT IN AN ORGANIZED HEALTH CARE EDUCATION/TRAINING PROGRAM

## 2021-12-23 PROCEDURE — 77385 HC IMRT, SIMPLE: CPT | Performed by: STUDENT IN AN ORGANIZED HEALTH CARE EDUCATION/TRAINING PROGRAM

## 2021-12-23 PROCEDURE — 77014 HC CT GUIDANCE RADIATION THERAPY FLDS PLACEMENT: CPT | Mod: TC | Performed by: STUDENT IN AN ORGANIZED HEALTH CARE EDUCATION/TRAINING PROGRAM

## 2021-12-23 PROCEDURE — 77014 PR  CT GUIDANCE PLACEMENT RAD THERAPY FIELDS: ICD-10-PCS | Mod: 26,,, | Performed by: STUDENT IN AN ORGANIZED HEALTH CARE EDUCATION/TRAINING PROGRAM

## 2021-12-23 PROCEDURE — 77014 PR  CT GUIDANCE PLACEMENT RAD THERAPY FIELDS: CPT | Mod: 26,,, | Performed by: STUDENT IN AN ORGANIZED HEALTH CARE EDUCATION/TRAINING PROGRAM

## 2021-12-27 PROCEDURE — 77014 PR  CT GUIDANCE PLACEMENT RAD THERAPY FIELDS: ICD-10-PCS | Mod: 26,,, | Performed by: STUDENT IN AN ORGANIZED HEALTH CARE EDUCATION/TRAINING PROGRAM

## 2021-12-27 PROCEDURE — 77014 HC CT GUIDANCE RADIATION THERAPY FLDS PLACEMENT: CPT | Mod: TC | Performed by: STUDENT IN AN ORGANIZED HEALTH CARE EDUCATION/TRAINING PROGRAM

## 2021-12-27 PROCEDURE — 77385 HC IMRT, SIMPLE: CPT | Performed by: STUDENT IN AN ORGANIZED HEALTH CARE EDUCATION/TRAINING PROGRAM

## 2021-12-27 PROCEDURE — 77014 PR  CT GUIDANCE PLACEMENT RAD THERAPY FIELDS: CPT | Mod: 26,,, | Performed by: STUDENT IN AN ORGANIZED HEALTH CARE EDUCATION/TRAINING PROGRAM

## 2021-12-28 PROCEDURE — 77014 PR  CT GUIDANCE PLACEMENT RAD THERAPY FIELDS: CPT | Mod: 26,,, | Performed by: STUDENT IN AN ORGANIZED HEALTH CARE EDUCATION/TRAINING PROGRAM

## 2021-12-28 PROCEDURE — 77385 HC IMRT, SIMPLE: CPT | Performed by: STUDENT IN AN ORGANIZED HEALTH CARE EDUCATION/TRAINING PROGRAM

## 2021-12-28 PROCEDURE — 77014 HC CT GUIDANCE RADIATION THERAPY FLDS PLACEMENT: CPT | Mod: TC | Performed by: STUDENT IN AN ORGANIZED HEALTH CARE EDUCATION/TRAINING PROGRAM

## 2021-12-28 PROCEDURE — 77014 PR  CT GUIDANCE PLACEMENT RAD THERAPY FIELDS: ICD-10-PCS | Mod: 26,,, | Performed by: STUDENT IN AN ORGANIZED HEALTH CARE EDUCATION/TRAINING PROGRAM

## 2021-12-29 ENCOUNTER — DOCUMENTATION ONLY (OUTPATIENT)
Dept: RADIATION ONCOLOGY | Facility: CLINIC | Age: 71
End: 2021-12-29
Payer: MEDICARE

## 2021-12-29 PROCEDURE — 77014 HC CT GUIDANCE RADIATION THERAPY FLDS PLACEMENT: CPT | Mod: TC | Performed by: STUDENT IN AN ORGANIZED HEALTH CARE EDUCATION/TRAINING PROGRAM

## 2021-12-29 PROCEDURE — 77385 HC IMRT, SIMPLE: CPT | Performed by: STUDENT IN AN ORGANIZED HEALTH CARE EDUCATION/TRAINING PROGRAM

## 2021-12-29 PROCEDURE — 77014 PR  CT GUIDANCE PLACEMENT RAD THERAPY FIELDS: CPT | Mod: 26,,, | Performed by: STUDENT IN AN ORGANIZED HEALTH CARE EDUCATION/TRAINING PROGRAM

## 2021-12-29 PROCEDURE — 77014 PR  CT GUIDANCE PLACEMENT RAD THERAPY FIELDS: ICD-10-PCS | Mod: 26,,, | Performed by: STUDENT IN AN ORGANIZED HEALTH CARE EDUCATION/TRAINING PROGRAM

## 2021-12-29 PROCEDURE — 77336 RADIATION PHYSICS CONSULT: CPT | Performed by: STUDENT IN AN ORGANIZED HEALTH CARE EDUCATION/TRAINING PROGRAM

## 2021-12-30 PROCEDURE — 77385 HC IMRT, SIMPLE: CPT | Performed by: STUDENT IN AN ORGANIZED HEALTH CARE EDUCATION/TRAINING PROGRAM

## 2021-12-30 PROCEDURE — 77014 PR  CT GUIDANCE PLACEMENT RAD THERAPY FIELDS: ICD-10-PCS | Mod: 26,,, | Performed by: STUDENT IN AN ORGANIZED HEALTH CARE EDUCATION/TRAINING PROGRAM

## 2021-12-30 PROCEDURE — 77014 HC CT GUIDANCE RADIATION THERAPY FLDS PLACEMENT: CPT | Mod: TC | Performed by: STUDENT IN AN ORGANIZED HEALTH CARE EDUCATION/TRAINING PROGRAM

## 2021-12-30 PROCEDURE — 77014 PR  CT GUIDANCE PLACEMENT RAD THERAPY FIELDS: CPT | Mod: 26,,, | Performed by: STUDENT IN AN ORGANIZED HEALTH CARE EDUCATION/TRAINING PROGRAM

## 2022-01-03 ENCOUNTER — HOSPITAL ENCOUNTER (OUTPATIENT)
Dept: RADIATION THERAPY | Facility: HOSPITAL | Age: 72
Discharge: HOME | End: 2022-01-03
Attending: STUDENT IN AN ORGANIZED HEALTH CARE EDUCATION/TRAINING PROGRAM
Payer: MEDICARE

## 2022-01-03 PROCEDURE — 77385 HC IMRT, SIMPLE: CPT | Performed by: STUDENT IN AN ORGANIZED HEALTH CARE EDUCATION/TRAINING PROGRAM

## 2022-01-03 PROCEDURE — 77014 PR  CT GUIDANCE PLACEMENT RAD THERAPY FIELDS: CPT | Mod: 26,,, | Performed by: STUDENT IN AN ORGANIZED HEALTH CARE EDUCATION/TRAINING PROGRAM

## 2022-01-03 PROCEDURE — 77014 PR  CT GUIDANCE PLACEMENT RAD THERAPY FIELDS: ICD-10-PCS | Mod: 26,,, | Performed by: STUDENT IN AN ORGANIZED HEALTH CARE EDUCATION/TRAINING PROGRAM

## 2022-01-03 PROCEDURE — 77014 HC CT GUIDANCE RADIATION THERAPY FLDS PLACEMENT: CPT | Mod: TC | Performed by: STUDENT IN AN ORGANIZED HEALTH CARE EDUCATION/TRAINING PROGRAM

## 2022-01-04 ENCOUNTER — DOCUMENTATION ONLY (OUTPATIENT)
Dept: RADIATION ONCOLOGY | Facility: CLINIC | Age: 72
End: 2022-01-04
Payer: MEDICARE

## 2022-01-04 PROCEDURE — 77014 HC CT GUIDANCE RADIATION THERAPY FLDS PLACEMENT: CPT | Mod: TC | Performed by: STUDENT IN AN ORGANIZED HEALTH CARE EDUCATION/TRAINING PROGRAM

## 2022-01-04 PROCEDURE — 77385 HC IMRT, SIMPLE: CPT | Performed by: STUDENT IN AN ORGANIZED HEALTH CARE EDUCATION/TRAINING PROGRAM

## 2022-01-04 PROCEDURE — 77014 PR  CT GUIDANCE PLACEMENT RAD THERAPY FIELDS: ICD-10-PCS | Mod: 26,,, | Performed by: STUDENT IN AN ORGANIZED HEALTH CARE EDUCATION/TRAINING PROGRAM

## 2022-01-04 PROCEDURE — 77014 PR  CT GUIDANCE PLACEMENT RAD THERAPY FIELDS: CPT | Mod: 26,,, | Performed by: STUDENT IN AN ORGANIZED HEALTH CARE EDUCATION/TRAINING PROGRAM

## 2022-01-04 PROCEDURE — 77336 RADIATION PHYSICS CONSULT: CPT | Performed by: STUDENT IN AN ORGANIZED HEALTH CARE EDUCATION/TRAINING PROGRAM

## 2022-01-04 NOTE — PLAN OF CARE
Outpatient radiation to the prostate completed 1/4/22. Pt to f/u with Dr mccartney in the near future.

## 2022-01-18 ENCOUNTER — TELEPHONE (OUTPATIENT)
Dept: RADIATION ONCOLOGY | Facility: CLINIC | Age: 72
End: 2022-01-18
Payer: MEDICARE

## 2022-01-18 NOTE — TELEPHONE ENCOUNTER
Call again to pt to inform him of f/u appt with Dr Thomas on 1/24/22. Pt informed of appt date, time and location. Appt reminder mailed.

## 2022-01-18 NOTE — TELEPHONE ENCOUNTER
Call to pt to schedule f/u with Dr Thomas after completing prostate radiation 1/3/22. Call went to VM. Message left for pt to call back regarding f/u appt scheduled 1/24/22 at 2:30 PM. Will await call back from pt.

## 2022-01-24 ENCOUNTER — OFFICE VISIT (OUTPATIENT)
Dept: RADIATION ONCOLOGY | Facility: CLINIC | Age: 72
End: 2022-01-24
Payer: MEDICARE

## 2022-01-24 VITALS
BODY MASS INDEX: 25.05 KG/M2 | TEMPERATURE: 98 F | RESPIRATION RATE: 17 BRPM | HEART RATE: 74 BPM | OXYGEN SATURATION: 98 % | WEIGHT: 175 LBS | DIASTOLIC BLOOD PRESSURE: 75 MMHG | SYSTOLIC BLOOD PRESSURE: 143 MMHG | HEIGHT: 70 IN

## 2022-01-24 DIAGNOSIS — C61 PROSTATE CANCER: Primary | ICD-10-CM

## 2022-01-24 PROCEDURE — 3288F FALL RISK ASSESSMENT DOCD: CPT | Mod: CPTII,S$GLB,, | Performed by: STUDENT IN AN ORGANIZED HEALTH CARE EDUCATION/TRAINING PROGRAM

## 2022-01-24 PROCEDURE — 1160F PR REVIEW ALL MEDS BY PRESCRIBER/CLIN PHARMACIST DOCUMENTED: ICD-10-PCS | Mod: CPTII,S$GLB,, | Performed by: STUDENT IN AN ORGANIZED HEALTH CARE EDUCATION/TRAINING PROGRAM

## 2022-01-24 PROCEDURE — 99214 OFFICE O/P EST MOD 30 MIN: CPT | Mod: S$GLB,,, | Performed by: STUDENT IN AN ORGANIZED HEALTH CARE EDUCATION/TRAINING PROGRAM

## 2022-01-24 PROCEDURE — 1126F PR PAIN SEVERITY QUANTIFIED, NO PAIN PRESENT: ICD-10-PCS | Mod: CPTII,S$GLB,, | Performed by: STUDENT IN AN ORGANIZED HEALTH CARE EDUCATION/TRAINING PROGRAM

## 2022-01-24 PROCEDURE — 3077F SYST BP >= 140 MM HG: CPT | Mod: CPTII,S$GLB,, | Performed by: STUDENT IN AN ORGANIZED HEALTH CARE EDUCATION/TRAINING PROGRAM

## 2022-01-24 PROCEDURE — 1101F PR PT FALLS ASSESS DOC 0-1 FALLS W/OUT INJ PAST YR: ICD-10-PCS | Mod: CPTII,S$GLB,, | Performed by: STUDENT IN AN ORGANIZED HEALTH CARE EDUCATION/TRAINING PROGRAM

## 2022-01-24 PROCEDURE — 3008F BODY MASS INDEX DOCD: CPT | Mod: CPTII,S$GLB,, | Performed by: STUDENT IN AN ORGANIZED HEALTH CARE EDUCATION/TRAINING PROGRAM

## 2022-01-24 PROCEDURE — 3078F PR MOST RECENT DIASTOLIC BLOOD PRESSURE < 80 MM HG: ICD-10-PCS | Mod: CPTII,S$GLB,, | Performed by: STUDENT IN AN ORGANIZED HEALTH CARE EDUCATION/TRAINING PROGRAM

## 2022-01-24 PROCEDURE — 99999 PR PBB SHADOW E&M-EST. PATIENT-LVL III: CPT | Mod: PBBFAC,,, | Performed by: STUDENT IN AN ORGANIZED HEALTH CARE EDUCATION/TRAINING PROGRAM

## 2022-01-24 PROCEDURE — 3078F DIAST BP <80 MM HG: CPT | Mod: CPTII,S$GLB,, | Performed by: STUDENT IN AN ORGANIZED HEALTH CARE EDUCATION/TRAINING PROGRAM

## 2022-01-24 PROCEDURE — 1159F MED LIST DOCD IN RCRD: CPT | Mod: CPTII,S$GLB,, | Performed by: STUDENT IN AN ORGANIZED HEALTH CARE EDUCATION/TRAINING PROGRAM

## 2022-01-24 PROCEDURE — 1101F PT FALLS ASSESS-DOCD LE1/YR: CPT | Mod: CPTII,S$GLB,, | Performed by: STUDENT IN AN ORGANIZED HEALTH CARE EDUCATION/TRAINING PROGRAM

## 2022-01-24 PROCEDURE — 1126F AMNT PAIN NOTED NONE PRSNT: CPT | Mod: CPTII,S$GLB,, | Performed by: STUDENT IN AN ORGANIZED HEALTH CARE EDUCATION/TRAINING PROGRAM

## 2022-01-24 PROCEDURE — 3008F PR BODY MASS INDEX (BMI) DOCUMENTED: ICD-10-PCS | Mod: CPTII,S$GLB,, | Performed by: STUDENT IN AN ORGANIZED HEALTH CARE EDUCATION/TRAINING PROGRAM

## 2022-01-24 PROCEDURE — 1160F RVW MEDS BY RX/DR IN RCRD: CPT | Mod: CPTII,S$GLB,, | Performed by: STUDENT IN AN ORGANIZED HEALTH CARE EDUCATION/TRAINING PROGRAM

## 2022-01-24 PROCEDURE — 1159F PR MEDICATION LIST DOCUMENTED IN MEDICAL RECORD: ICD-10-PCS | Mod: CPTII,S$GLB,, | Performed by: STUDENT IN AN ORGANIZED HEALTH CARE EDUCATION/TRAINING PROGRAM

## 2022-01-24 PROCEDURE — 3077F PR MOST RECENT SYSTOLIC BLOOD PRESSURE >= 140 MM HG: ICD-10-PCS | Mod: CPTII,S$GLB,, | Performed by: STUDENT IN AN ORGANIZED HEALTH CARE EDUCATION/TRAINING PROGRAM

## 2022-01-24 PROCEDURE — 99214 PR OFFICE/OUTPT VISIT, EST, LEVL IV, 30-39 MIN: ICD-10-PCS | Mod: S$GLB,,, | Performed by: STUDENT IN AN ORGANIZED HEALTH CARE EDUCATION/TRAINING PROGRAM

## 2022-01-24 PROCEDURE — 99999 PR PBB SHADOW E&M-EST. PATIENT-LVL III: ICD-10-PCS | Mod: PBBFAC,,, | Performed by: STUDENT IN AN ORGANIZED HEALTH CARE EDUCATION/TRAINING PROGRAM

## 2022-01-24 PROCEDURE — 3288F PR FALLS RISK ASSESSMENT DOCUMENTED: ICD-10-PCS | Mod: CPTII,S$GLB,, | Performed by: STUDENT IN AN ORGANIZED HEALTH CARE EDUCATION/TRAINING PROGRAM

## 2022-01-24 NOTE — PROGRESS NOTES
Radiation Oncology Follow-up Note        Date of Service: 01/24/2022     Chief Complaint:  unfavorable IR prostate cancer     Reason for visit: post RT toxicity check     Referring Physician: Dr. Warner (urology)     Identification/Diagnosis:   Odilon Wilder is a 71 y.o. man with unfavorable IR GG2 prostate adenocarcinoma (cT1c, PSA 13.6, GS 3+4=7) s/p URONAV prostate biopsy 08/18/2021 with 3/13 cores positive in right middle + base and right apex (target) and MRI showing 171cc gland; no EPE, NVBI, SVI, or LNI - MSK nomogram risk: 48% EPE, 4% SVI, 5% LNI  --  BPH (on Flomax) with LUTS, T2DM and HTN.   His very large prostate causes high PSA with large fluctuations and he could actually be favorable IR. Prolaris came back as 1.6% risk of 10-yrDM using active treatment, placing him in the category of patients benefiting best of single-modality treatment. Patient glad as he had mentioned he preferred no ADT during initial evaluation.  Now s/p 70Gy/28fx to prostate gland completed 01/04/2022.     Therapy to Date:  Course: C1 PELVIS 2021    Treatment Site Ref. ID Energy Dose/Fx (Gy) #Fx Dose Correction (Gy) Total Dose (Gy) Start Date End Date Elapsed Days   IM Prostate [id number] 6X 2.5 28 / 28 0 70 11/22/2021 1/4/2022 43     Tolerated radiation therapy well with expected G1 urinary toxicity, without treatment breaks. Continue ibuprofen 400mg BID PRN      Subjective:  In clinic he reports feeling very well. He no longer takes buprofen 400mg BID PRN.  He no longer has urinary frequency, nocturia or dysuria.  He has been having postnasal drip and cough.    Compared to prior to RT, he reports that he feels 100% better and is very happy. No nocturia or frequency  He denies any bowel symptoms or any other major complaints.    Review of Systems   Constitutional: Negative for fatigue.   HENT: Positive for sinus pressure/congestion. Negative for hearing loss.    Eyes: Negative for visual disturbance.   Respiratory: Positive for  cough. Negative for shortness of breath and wheezing.    Cardiovascular: Negative for chest pain, palpitations and leg swelling.   Gastrointestinal: Negative for abdominal pain.   Genitourinary: Negative for bladder incontinence, decreased urine volume, difficulty urinating, dysuria, frequency, hematuria and urgency.   Musculoskeletal: Negative for arthralgias, back pain and neck pain.   Integumentary:  Negative for rash.   Neurological: Negative for weakness and headaches.   Psychiatric/Behavioral: Negative for dysphoric mood. The patient is not nervous/anxious.          Objective:      Physical Exam  Vitals reviewed.   Constitutional:       Appearance: Normal appearance.   HENT:      Head: Atraumatic.      Mouth/Throat:      Mouth: Mucous membranes are moist.   Eyes:      Conjunctiva/sclera: Conjunctivae normal.   Cardiovascular:      Comments: Extremities well perfused  Pulmonary:      Effort: Pulmonary effort is normal.   Abdominal:      General: There is no distension.   Genitourinary:     Comments: ANN deferred s/p EBRT  Musculoskeletal:         General: Normal range of motion.      Cervical back: Normal range of motion.   Skin:     General: Skin is warm and dry.   Neurological:      Mental Status: He is alert and oriented to person, place, and time.   Psychiatric:         Mood and Affect: Mood normal.         Behavior: Behavior normal.       no new labs, path or imaging    Assessment:       Odilon Wilder is a 71 y.o. man with unfavorable IR GG2 prostate adenocarcinoma (cT1c, PSA 13.6, GS 3+4=7) s/p URONAV prostate biopsy 08/18/2021 with 3/13 cores positive in right middle + base and right apex (target) and MRI showing 171cc gland; no EPE, NVBI, SVI, or LNI - MSK nomogram risk: 48% EPE, 4% SVI, 5% LNI  --  BPH (on Flomax) with LUTS, T2DM and HTN.   His very large prostate causes high PSA with large fluctuations and he could actually be favorable IR. Prolaris came back as 1.6% risk of 10-yrDM using active  treatment, placing him in the category of patients benefiting best of single-modality treatment. Patient glad as he had mentioned he preferred no ADT during initial evaluation.  Now s/p 70Gy/28fx to prostate gland completed 01/04/2022.    ECOG 0    His RT related G1 urinary toxicity has completely resolved.  Also he reports that RT resolved his frequency , nocturia and urinary obstruction symptoms.           Plan:           We will see patient in 6 months for treatment surveillance after PSA draw         I spent approximately 30 minutes reviewing the available records and evaluating the patient, out of which over 50% of the time was spent face to face with the patient in counseling and coordinating this patient's care.     Thank you for the opportunity to care for this patient. Please do not hesitate to contact me with any questions.     Ben Thomas MD/PhD

## 2022-03-04 ENCOUNTER — PES CALL (OUTPATIENT)
Dept: ADMINISTRATIVE | Facility: CLINIC | Age: 72
End: 2022-03-04
Payer: MEDICARE

## 2022-03-07 ENCOUNTER — LAB VISIT (OUTPATIENT)
Dept: LAB | Facility: HOSPITAL | Age: 72
End: 2022-03-07
Attending: UROLOGY
Payer: MEDICARE

## 2022-03-07 DIAGNOSIS — C61 PROSTATE CANCER: ICD-10-CM

## 2022-03-07 LAB — COMPLEXED PSA SERPL-MCNC: 3 NG/ML (ref 0–4)

## 2022-03-07 PROCEDURE — 36415 COLL VENOUS BLD VENIPUNCTURE: CPT | Mod: PO | Performed by: UROLOGY

## 2022-03-07 PROCEDURE — 84153 ASSAY OF PSA TOTAL: CPT | Performed by: UROLOGY

## 2022-03-12 NOTE — TELEPHONE ENCOUNTER
Care Due:                  Date            Visit Type   Department     Provider  --------------------------------------------------------------------------------                                SAME DAY -                              ESTABLISHED   [facility]  Last Visit: 05-      PATIENT      MEDICINE       Ryley Porter  Next Visit: None Scheduled  None         None Found                                                            Last  Test          Frequency    Reason                     Performed    Due Date  --------------------------------------------------------------------------------    Office Visit  12 months..  atorvastatin,              05- 05-                             glimepiride, lisinopriL,                             metFORMIN................    CMP.........  12 months..  atorvastatin,              05- 05-                             glimepiride, lisinopriL,                             metFORMIN................    HBA1C.......  6 months...  glimepiride, metFORMIN...  05- 11-    Lipid Panel.  12 months..  atorvastatin.............  05- 05-    Powered by [employer]. Reference number: [id number].   3/12/2022 8:00:13 AM CST

## 2022-03-16 RX ORDER — ATORVASTATIN CALCIUM 10 MG/1
TABLET, FILM COATED ORAL
Qty: 45 TABLET | Refills: 0 | Status: SHIPPED | OUTPATIENT
Start: 2022-03-16 | End: 2022-06-28 | Stop reason: SDUPTHER

## 2022-03-16 NOTE — TELEPHONE ENCOUNTER
Refill Authorization Note   Odilon Wilder  is requesting a refill authorization.  Brief Assessment and Rationale for Refill:  Approve    -Medication-Related Problems Identified: Requires labs  Medication Therapy Plan:       Medication Reconciliation Completed: No   Comments:   --->Care Gap information included below if applicable.       Requested Prescriptions   Pending Prescriptions Disp Refills    atorvastatin (LIPITOR) 10 MG tablet [Pharmacy Med Name: Atorvastatin Calcium 10 MG Oral Tablet] 45 tablet 0     Sig: TAKE 1 TABLET BY MOUTH EVERY OTHER DAY       Cardiovascular:  Antilipid - Statins Passed - 3/12/2022  7:59 AM        Passed - Patient is at least 18 years old        Passed - Valid encounter within last 15 months     Recent Visits  Date Type Provider Dept   05/19/21 Office Visit Ryley Porter MD [facility]   10/12/20 Office Visit Ryley Porter MD [facility]   05/18/20 Office Visit Ryley Porter MD [facility]   Showing recent visits within past 720 days and meeting all other requirements  Future Appointments  No visits were found meeting these conditions.  Showing future appointments within next 150 days and meeting all other requirements      Future Appointments              In 4 months LAB, [facility] - Lab, [facility]    In 4 months Ben Thomas MD [place] - Radiation Oncology 2nd Floor, [facility]                Passed - ALT is 131 or below and within 360 days     ALT   Date Value Ref Range Status   05/24/2021 13 10 - 44 U/L Final   05/26/2020 12 9 - 46 U/L Final   08/12/2019 15 10 - 44 U/L Final              Passed - AST is 119 or below and within 360 days     AST   Date Value Ref Range Status   05/24/2021 24 15 - 46 U/L Final   05/26/2020 14 10 - 35 U/L Final   08/12/2019 18 15 - 46 U/L Final              Passed - Total Cholesterol within 360 days     Lab Results   Component Value Date    CHOL 141 05/24/2021    CHOL 122 05/26/2020     CHOL 147 08/12/2019              Passed - LDL within 360 days     LDL Cholesterol   Date Value Ref Range Status   05/24/2021 73.0 63.0 - 159.0 mg/dL Final     Comment:     The National Cholesterol Education Program (NCEP) has set the  following guidelines (reference values) for LDL Cholesterol:  Optimal.......................<130 mg/dL  Borderline High...............130-159 mg/dL  High..........................160-189 mg/dL  Very High.....................>190 mg/dL              Passed - HDL within 360 days     HDL   Date Value Ref Range Status   05/24/2021 37 (L) 40 - 75 mg/dL Final     Comment:     The National Cholesterol Education Program (NCEP) has set the  following guidelines (reference values) for HDL Cholesterol:  Low...............<40 mg/dL  Optimal...........>60 mg/dL              Passed - Triglycerides within 360 days     Lab Results   Component Value Date    TRIG 155 (H) 05/24/2021    TRIG 219 (H) 05/26/2020    TRIG 113 08/12/2019                  Appointments  past 12m or future 3m with PCP    Date Provider   Last Visit   5/19/2021 Ryley Porter MD   Next Visit   Visit date not found Ryley Porter MD   ED visits in past 90 days: 0     Note composed:10:29 AM 03/16/2022

## 2022-05-04 ENCOUNTER — TELEPHONE (OUTPATIENT)
Dept: FAMILY MEDICINE | Facility: CLINIC | Age: 72
End: 2022-05-04
Payer: MEDICARE

## 2022-05-04 DIAGNOSIS — E11.9 TYPE 2 DIABETES MELLITUS WITHOUT COMPLICATION, UNSPECIFIED WHETHER LONG TERM INSULIN USE: Primary | ICD-10-CM

## 2022-05-04 NOTE — TELEPHONE ENCOUNTER
Pt scheduled to see you June 6  Last appt may 11    a1c and micro ordered    Do you need any other lab work done?  Please rtn to nurse to link all labs to this appt

## 2022-05-10 ENCOUNTER — PATIENT OUTREACH (OUTPATIENT)
Dept: ADMINISTRATIVE | Facility: HOSPITAL | Age: 72
End: 2022-05-10
Payer: MEDICARE

## 2022-05-10 ENCOUNTER — LAB VISIT (OUTPATIENT)
Dept: LAB | Facility: HOSPITAL | Age: 72
End: 2022-05-10
Attending: FAMILY MEDICINE
Payer: MEDICARE

## 2022-05-10 DIAGNOSIS — E11.9 TYPE 2 DIABETES MELLITUS WITHOUT COMPLICATION, UNSPECIFIED WHETHER LONG TERM INSULIN USE: ICD-10-CM

## 2022-05-10 LAB
ALBUMIN/CREAT UR: 15.8 UG/MG (ref 0–30)
CHOLEST SERPL-MCNC: 148 MG/DL (ref 120–199)
CHOLEST/HDLC SERPL: 3.8 {RATIO} (ref 2–5)
CREAT UR-MCNC: 101 MG/DL (ref 23–375)
ESTIMATED AVG GLUCOSE: 97 MG/DL (ref 68–131)
HBA1C MFR BLD: 5 % (ref 4–5.6)
HDLC SERPL-MCNC: 39 MG/DL (ref 40–75)
HDLC SERPL: 26.4 % (ref 20–50)
LDLC SERPL CALC-MCNC: 87 MG/DL (ref 63–159)
MICROALBUMIN UR DL<=1MG/L-MCNC: 16 UG/ML
NONHDLC SERPL-MCNC: 109 MG/DL
TRIGL SERPL-MCNC: 110 MG/DL (ref 30–150)

## 2022-05-10 PROCEDURE — 82570 ASSAY OF URINE CREATININE: CPT | Mod: PO | Performed by: FAMILY MEDICINE

## 2022-05-10 PROCEDURE — 36415 COLL VENOUS BLD VENIPUNCTURE: CPT | Mod: PO | Performed by: FAMILY MEDICINE

## 2022-05-10 PROCEDURE — 80061 LIPID PANEL: CPT | Performed by: FAMILY MEDICINE

## 2022-05-10 PROCEDURE — 82043 UR ALBUMIN QUANTITATIVE: CPT | Mod: PO | Performed by: FAMILY MEDICINE

## 2022-05-10 PROCEDURE — 83036 HEMOGLOBIN GLYCOSYLATED A1C: CPT | Performed by: FAMILY MEDICINE

## 2022-05-11 ENCOUNTER — TELEPHONE (OUTPATIENT)
Dept: FAMILY MEDICINE | Facility: CLINIC | Age: 72
End: 2022-05-11
Payer: MEDICARE

## 2022-05-11 NOTE — LETTER
May 13, 2022    Odilon Wilder  [address]  [place] [address]             [facility]  [address] [facility] [address]  Phone: [phone]  Fax: [phone] Dear Mr. Odilon Wilder:    Below are the results from your recent visit:    Resulted Orders   Microalbumin/Creatinine Ratio, Urine   Result Value Ref Range    Microalbumin, Urine 16.0 ug/mL    Creatinine, Urine 101.0 23.0 - 375.0 mg/dL    Microalb/Creat Ratio 15.8 0.0 - 30.0 ug/mg    Narrative    Specimen Source->Urine   Hemoglobin A1C   Result Value Ref Range    Hemoglobin A1C 5.0 4.0 - 5.6 %      Comment:      ADA Screening Guidelines:  5.7-6.4%  Consistent with prediabetes  >or=6.5%  Consistent with diabetes    High levels of fetal hemoglobin interfere with the HbA1C  assay. Heterozygous hemoglobin variants (HbS, HgC, etc)do  not significantly interfere with this assay.   However, presence of multiple variants may affect accuracy.      Estimated Avg Glucose 97 68 - 131 mg/dL   Lipid Panel   Result Value Ref Range    Cholesterol 148 120 - 199 mg/dL      Comment:      The National Cholesterol Education Program (NCEP) has set the  following guidelines (reference ranges) for Cholesterol:  Optimal.....................<200 mg/dL  Borderline High.............200-239 mg/dL  High........................> or = 240 mg/dL      Triglycerides 110 30 - 150 mg/dL      Comment:      The National Cholesterol Education Program (NCEP) has set the  following guidelines (reference values) for triglycerides:  Normal......................<150 mg/dL  Borderline High.............150-199 mg/dL  High........................200-499 mg/dL      HDL 39 (L) 40 - 75 mg/dL      Comment:      The National Cholesterol Education Program (NCEP) has set the  following guidelines (reference values) for HDL Cholesterol:  Low...............<40 mg/dL  Optimal...........>60 mg/dL      LDL Cholesterol 87.0 63.0 - 159.0 mg/dL      Comment:      The National  Cholesterol Education Program (NCEP) has set the  following guidelines (reference values) for LDL Cholesterol:  Optimal.......................<130 mg/dL  Borderline High...............130-159 mg/dL  High..........................160-189 mg/dL  Very High.....................>190 mg/dL      HDL/Cholesterol Ratio 26.4 20.0 - 50.0 %    Total Cholesterol/HDL Ratio 3.8 2.0 - 5.0    Non-HDL Cholesterol 109 mg/dL      Comment:      Risk category and Non-HDL cholesterol goals:  Coronary heart disease (CHD)or equivalent (10-year risk of CHD >20%):  Non-HDL cholesterol goal     <130 mg/dL  Two or more CHD risk factors and 10-year risk of CHD <= 20%:  Non-HDL cholesterol goal     <160 mg/dL  0 to 1 CHD risk factor:  Non-HDL cholesterol goal     <190 mg/dL       Your results are normal.  Please don't hesitate to call our office if you have any questions or concerns.      Sincerely,        Ryley Porter MD

## 2022-05-13 NOTE — TELEPHONE ENCOUNTER
Attempted patient. LVM to call the office back in regards to test results.    As this is the second attempt to reach patient I have mailed the patient a letter with their resutls.

## 2022-05-23 ENCOUNTER — PATIENT OUTREACH (OUTPATIENT)
Dept: ADMINISTRATIVE | Facility: HOSPITAL | Age: 72
End: 2022-05-23
Payer: MEDICARE

## 2022-05-23 NOTE — LETTER
May 23, 2022    Odilon Wilder  [address]  [place] [address]             [facility]  [address]  [place] [address]  Phone: [phone] Dear Bennie [facility] is committed to your overall health.  To help you get the most out of each of your visits, we will review your information to make sure you are up to date on all of your recommended tests and/or procedures.      Ryley Porter MD  has found that your chart shows you may be due for:    Colon cancer screening  Routine Dilated Eye Exam  (Diabetic Retinopathy screening)     If you have had any of the above done at another facility, please bring the records or information with you so that your record at [facility] will be complete.  If you would like to schedule any of these, please contact me.    If you are currently taking medication, please bring it with you to your appointment for review.        Thank you for letting us care for you,      Lucretia Archer, Care Coordinator  [facility]  Phone:  [phone]  Fax: [phone]

## 2022-05-23 NOTE — PROGRESS NOTES
2022 Care Everywhere updates requested and reviewed.  Immunizations reconciled. Media reports reviewed.  Duplicate HM overrides and  orders removed.  Overdue HM topic chart audit and/or requested.  Overdue lab testing linked to upcoming lab appointments if applies.    Letter mailed.        Health Maintenance Due   Topic Date Due    COVID-19 Vaccine (1) Never done    Shingles Vaccine (1 of 2) Never done    Pneumococcal Vaccines (Age 65+) (2 - PPSV23 or PCV20) 2019    Eye Exam  2020    Colorectal Cancer Screening  2021    Foot Exam  10/12/2021

## 2022-06-07 ENCOUNTER — PES CALL (OUTPATIENT)
Dept: ADMINISTRATIVE | Facility: CLINIC | Age: 72
End: 2022-06-07
Payer: MEDICARE

## 2022-06-14 ENCOUNTER — PATIENT OUTREACH (OUTPATIENT)
Dept: ADMINISTRATIVE | Facility: HOSPITAL | Age: 72
End: 2022-06-14
Payer: MEDICARE

## 2022-06-24 ENCOUNTER — PATIENT OUTREACH (OUTPATIENT)
Dept: ADMINISTRATIVE | Facility: HOSPITAL | Age: 72
End: 2022-06-24
Payer: MEDICARE

## 2022-06-28 ENCOUNTER — OFFICE VISIT (OUTPATIENT)
Dept: FAMILY MEDICINE | Facility: CLINIC | Age: 72
End: 2022-06-28
Payer: MEDICARE

## 2022-06-28 VITALS
HEART RATE: 98 BPM | SYSTOLIC BLOOD PRESSURE: 106 MMHG | TEMPERATURE: 98 F | WEIGHT: 176.25 LBS | OXYGEN SATURATION: 96 % | BODY MASS INDEX: 24.68 KG/M2 | HEIGHT: 71 IN | DIASTOLIC BLOOD PRESSURE: 54 MMHG

## 2022-06-28 DIAGNOSIS — Z46.6 ENCOUNTER FOR FOLEY CATHETER REMOVAL: ICD-10-CM

## 2022-06-28 DIAGNOSIS — N40.1 BPH WITH URINARY OBSTRUCTION: ICD-10-CM

## 2022-06-28 DIAGNOSIS — N13.8 BPH WITH URINARY OBSTRUCTION: ICD-10-CM

## 2022-06-28 DIAGNOSIS — Z12.11 SCREEN FOR COLON CANCER: Primary | ICD-10-CM

## 2022-06-28 DIAGNOSIS — R33.9 URINARY RETENTION: ICD-10-CM

## 2022-06-28 DIAGNOSIS — I10 ESSENTIAL HYPERTENSION: ICD-10-CM

## 2022-06-28 DIAGNOSIS — E11.9 TYPE 2 DIABETES MELLITUS WITHOUT COMPLICATION, UNSPECIFIED WHETHER LONG TERM INSULIN USE: ICD-10-CM

## 2022-06-28 PROCEDURE — 3288F FALL RISK ASSESSMENT DOCD: CPT | Mod: CPTII,S$GLB,, | Performed by: FAMILY MEDICINE

## 2022-06-28 PROCEDURE — 3288F PR FALLS RISK ASSESSMENT DOCUMENTED: ICD-10-PCS | Mod: CPTII,S$GLB,, | Performed by: FAMILY MEDICINE

## 2022-06-28 PROCEDURE — 3044F PR MOST RECENT HEMOGLOBIN A1C LEVEL <7.0%: ICD-10-PCS | Mod: CPTII,S$GLB,, | Performed by: FAMILY MEDICINE

## 2022-06-28 PROCEDURE — 3044F HG A1C LEVEL LT 7.0%: CPT | Mod: CPTII,S$GLB,, | Performed by: FAMILY MEDICINE

## 2022-06-28 PROCEDURE — 3078F DIAST BP <80 MM HG: CPT | Mod: CPTII,S$GLB,, | Performed by: FAMILY MEDICINE

## 2022-06-28 PROCEDURE — 1101F PT FALLS ASSESS-DOCD LE1/YR: CPT | Mod: CPTII,S$GLB,, | Performed by: FAMILY MEDICINE

## 2022-06-28 PROCEDURE — 3074F SYST BP LT 130 MM HG: CPT | Mod: CPTII,S$GLB,, | Performed by: FAMILY MEDICINE

## 2022-06-28 PROCEDURE — 3008F BODY MASS INDEX DOCD: CPT | Mod: CPTII,S$GLB,, | Performed by: FAMILY MEDICINE

## 2022-06-28 PROCEDURE — 99397 PER PM REEVAL EST PAT 65+ YR: CPT | Mod: GZ,S$GLB,, | Performed by: FAMILY MEDICINE

## 2022-06-28 PROCEDURE — 1101F PR PT FALLS ASSESS DOC 0-1 FALLS W/OUT INJ PAST YR: ICD-10-PCS | Mod: CPTII,S$GLB,, | Performed by: FAMILY MEDICINE

## 2022-06-28 PROCEDURE — 1126F AMNT PAIN NOTED NONE PRSNT: CPT | Mod: CPTII,S$GLB,, | Performed by: FAMILY MEDICINE

## 2022-06-28 PROCEDURE — 3074F PR MOST RECENT SYSTOLIC BLOOD PRESSURE < 130 MM HG: ICD-10-PCS | Mod: CPTII,S$GLB,, | Performed by: FAMILY MEDICINE

## 2022-06-28 PROCEDURE — 3061F PR NEG MICROALBUMINURIA RESULT DOCUMENTED/REVIEW: ICD-10-PCS | Mod: CPTII,S$GLB,, | Performed by: FAMILY MEDICINE

## 2022-06-28 PROCEDURE — 3061F NEG MICROALBUMINURIA REV: CPT | Mod: CPTII,S$GLB,, | Performed by: FAMILY MEDICINE

## 2022-06-28 PROCEDURE — 99499 RISK ADDL DX/OHS AUDIT: ICD-10-PCS | Mod: S$GLB,,, | Performed by: FAMILY MEDICINE

## 2022-06-28 PROCEDURE — 3066F NEPHROPATHY DOC TX: CPT | Mod: CPTII,S$GLB,, | Performed by: FAMILY MEDICINE

## 2022-06-28 PROCEDURE — 3078F PR MOST RECENT DIASTOLIC BLOOD PRESSURE < 80 MM HG: ICD-10-PCS | Mod: CPTII,S$GLB,, | Performed by: FAMILY MEDICINE

## 2022-06-28 PROCEDURE — 99397 PR PREVENTIVE VISIT,EST,65 & OVER: ICD-10-PCS | Mod: GZ,S$GLB,, | Performed by: FAMILY MEDICINE

## 2022-06-28 PROCEDURE — 4010F ACE/ARB THERAPY RXD/TAKEN: CPT | Mod: CPTII,S$GLB,, | Performed by: FAMILY MEDICINE

## 2022-06-28 PROCEDURE — 99499 UNLISTED E&M SERVICE: CPT | Mod: S$GLB,,, | Performed by: FAMILY MEDICINE

## 2022-06-28 PROCEDURE — 1126F PR PAIN SEVERITY QUANTIFIED, NO PAIN PRESENT: ICD-10-PCS | Mod: CPTII,S$GLB,, | Performed by: FAMILY MEDICINE

## 2022-06-28 PROCEDURE — 4010F PR ACE/ARB THEARPY RXD/TAKEN: ICD-10-PCS | Mod: CPTII,S$GLB,, | Performed by: FAMILY MEDICINE

## 2022-06-28 PROCEDURE — 3008F PR BODY MASS INDEX (BMI) DOCUMENTED: ICD-10-PCS | Mod: CPTII,S$GLB,, | Performed by: FAMILY MEDICINE

## 2022-06-28 PROCEDURE — 3066F PR DOCUMENTATION OF TREATMENT FOR NEPHROPATHY: ICD-10-PCS | Mod: CPTII,S$GLB,, | Performed by: FAMILY MEDICINE

## 2022-06-28 RX ORDER — METFORMIN HYDROCHLORIDE 500 MG/1
500 TABLET ORAL 2 TIMES DAILY WITH MEALS
Qty: 180 TABLET | Refills: 3 | Status: SHIPPED | OUTPATIENT
Start: 2022-06-28

## 2022-06-28 RX ORDER — GLIMEPIRIDE 1 MG/1
1 TABLET ORAL
Qty: 90 TABLET | Refills: 3 | Status: SHIPPED | OUTPATIENT
Start: 2022-06-28 | End: 2023-09-27

## 2022-06-28 RX ORDER — ATORVASTATIN CALCIUM 10 MG/1
10 TABLET, FILM COATED ORAL EVERY OTHER DAY
Qty: 45 TABLET | Refills: 3 | Status: SHIPPED | OUTPATIENT
Start: 2022-06-28 | End: 2023-07-05

## 2022-06-28 RX ORDER — LISINOPRIL 2.5 MG/1
TABLET ORAL
Qty: 90 TABLET | Refills: 3 | Status: SHIPPED | OUTPATIENT
Start: 2022-06-28 | End: 2023-09-27

## 2022-06-28 RX ORDER — TAMSULOSIN HYDROCHLORIDE 0.4 MG/1
CAPSULE ORAL
Qty: 90 CAPSULE | Refills: 3 | Status: SHIPPED | OUTPATIENT
Start: 2022-06-28

## 2022-06-28 NOTE — PROGRESS NOTES
" Patient ID: Odilon Wilder is a 72 y.o. male.    Chief Complaint: Annual Exam    HPI      Odilon Wilder is a 72 y.o. male. here for annual exam.   No current complaints.        Review of Symptoms    Constitutional: Negative.    HENT: Negative.    Eyes: Negative.    Respiratory: Negative.    Cardiovascular: Negative.    Gastrointestinal: Negative.    Endocrine: Negative.    Genitourinary: Negative.    Musculoskeletal: Negative.    Skin: Negative.    Allergic/Immunologic: Negative.    Neurological: Negative.    Hematological: Negative.    Psychiatric/Behavioral: Negative.      Except as above in HPI      Vitals:    06/28/22 0808   BP: (!) 106/54   Pulse: 98   Temp: 98.1 °F (36.7 °C)   SpO2: 96%   Weight: 79.9 kg (176 lb 4.1 oz)   Height: 5' 10.5" (1.791 m)        Physical  Exam      Constitutional:  Oriented to person, place, and time. Appears well-developed and well-nourished.     HENT:   Head: Normocephalic and atraumatic.     Right Ear: Tympanic membrane, ear canal and External ear normal     Left Ear: Tympanic membrane, ear canal and External ear normal     Nose: Nose normal. No rhinorrhea or nasal deformity.     Mouth/Throat: Uvula is midline, oropharynx is clear and moist and mucous membranes are normal.      Eyes: Conjunctivae are normal. Right eye exhibits no discharge. Left eye exhibits no discharge. No scleral icterus.     Neck:  No JVD present. No tracheal deviation  []  Neck supple.   []  No Carotid bruit    Cardiovascular:  Regular rate and rhythm with normal S1 and S2     Pulmonary/Chest:   Clear to auscultation bilaterally without wheezes, rhonchi or rales    Musculoskeletal: Normal range of motion. No edema or tenderness.   No deformity     Lymphadenopathy:  No cervical adenopathy.     Neurological:  Alert and oriented to person, place, and time. Coordination normal.     Skin: Skin is warm and dry. No rash noted.     Psychiatric: Normal mood and affect. Speech is normal and behavior is normal. " Judgment and thought content normal.     Complete Blood Count  Lab Results   Component Value Date    RBC 4.96 06/01/2021    HGB 16.0 06/01/2021    HCT 48.0 06/01/2021    MCV 97 06/01/2021    MCH 32.3 (H) 06/01/2021    MCHC 33.3 06/01/2021    RDW 10.9 (L) 06/01/2021     06/01/2021    MPV 9.5 06/01/2021    GRAN 5.2 06/01/2021    GRAN 65.7 06/01/2021    LYMPH 1.9 06/01/2021    LYMPH 23.9 06/01/2021    MONO 0.5 06/01/2021    MONO 6.4 06/01/2021    EOS 0.3 06/01/2021    BASO 0.03 06/01/2021    EOSINOPHIL 3.3 06/01/2021    BASOPHIL 0.4 06/01/2021    DIFFMETHOD Automated 06/01/2021       Comprehensive Metabolic Panel  No results found for: GLU, BUN, CREATININE, NA, K, CL, PROT, ALBUMIN, BILITOT, AST, ALKPHOS, CO2, ALT, ANIONGAP, EGFRNONAA, ESTGFRAFRICA    TSH  No results found for: TSH    Assessment / Plan:      ICD-10-CM ICD-9-CM   1. Screen for colon cancer  Z12.11 V76.51   2. Type 2 diabetes mellitus without complication, unspecified whether long term insulin use  E11.9 250.00   3. BPH with urinary obstruction  N40.1 600.01    N13.8 599.69   4. Urinary retention  R33.9 788.20   5. Encounter for Aviles catheter removal  Z46.6 V53.6   6. Essential hypertension  I10 401.9     Screen for colon cancer  -     Case Request Endoscopy: COLONOSCOPY    Type 2 diabetes mellitus without complication, unspecified whether long term insulin use  -     Ambulatory referral/consult to Ophthalmology; Future; Expected date: 07/05/2022  -     Hemoglobin A1C; Standing  -     Comprehensive Metabolic Panel; Future  -     CBC Auto Differential; Future  -     Lipid Panel; Future  -     TSH; Future    BPH with urinary obstruction  -     tamsulosin (FLOMAX) 0.4 mg Cap; TAKE 1 CAPSULE BY MOUTH ONCE DAILY. DO NOT CRUSH OR CHEW. SWALLOW WHOLE  Dispense: 90 capsule; Refill: 3    Urinary retention  -     tamsulosin (FLOMAX) 0.4 mg Cap; TAKE 1 CAPSULE BY MOUTH ONCE DAILY. DO NOT CRUSH OR CHEW. SWALLOW WHOLE  Dispense: 90 capsule; Refill:  3    Encounter for Aviles catheter removal  -     tamsulosin (FLOMAX) 0.4 mg Cap; TAKE 1 CAPSULE BY MOUTH ONCE DAILY. DO NOT CRUSH OR CHEW. SWALLOW WHOLE  Dispense: 90 capsule; Refill: 3    Essential hypertension  -     Hemoglobin A1C; Standing  -     Comprehensive Metabolic Panel; Future  -     CBC Auto Differential; Future  -     Lipid Panel; Future  -     TSH; Future    Other orders  -     atorvastatin (LIPITOR) 10 MG tablet; Take 1 tablet (10 mg total) by mouth every other day.  Dispense: 45 tablet; Refill: 3  -     glimepiride (AMARYL) 1 MG tablet; Take 1 tablet (1 mg total) by mouth before breakfast.  Dispense: 90 tablet; Refill: 3  -     metFORMIN (GLUCOPHAGE) 500 MG tablet; Take 1 tablet (500 mg total) by mouth 2 (two) times daily with meals.  Dispense: 180 tablet; Refill: 3  -     lisinopriL (PRINIVIL,ZESTRIL) 2.5 MG tablet; TAKE 1 TABLET BY MOUTH ONCE DAILY FOR PROTECTION OF KIDNEY  Dispense: 90 tablet; Refill: 3          Discussed how to stay healthy including: diet, and exercise.

## 2022-07-01 PROBLEM — E11.69 TYPE 2 DIABETES MELLITUS WITH HYPERLIPIDEMIA: Status: ACTIVE | Noted: 2022-07-01

## 2022-07-01 PROBLEM — E11.59 HYPERTENSION ASSOCIATED WITH TYPE 2 DIABETES MELLITUS: Status: ACTIVE | Noted: 2022-07-01

## 2022-07-01 PROBLEM — N40.0 BPH (BENIGN PROSTATIC HYPERPLASIA): Status: ACTIVE | Noted: 2022-07-01

## 2022-07-01 PROBLEM — Z87.891 PERSONAL HISTORY OF TOBACCO USE: Status: ACTIVE | Noted: 2022-07-01

## 2022-07-01 PROBLEM — E78.5 TYPE 2 DIABETES MELLITUS WITH HYPERLIPIDEMIA: Status: ACTIVE | Noted: 2022-07-01

## 2022-07-01 PROBLEM — I15.2 HYPERTENSION ASSOCIATED WITH TYPE 2 DIABETES MELLITUS: Status: ACTIVE | Noted: 2022-07-01

## 2022-07-01 NOTE — PROGRESS NOTES
"  Odilon Wilder presented for a  Medicare AWV and comprehensive Health Risk Assessment today. The following components were reviewed and updated:    · Medical history  · Family History  · Social history  · Allergies and Current Medications  · Health Risk Assessment  · Health Maintenance  · Care Team         ** See Completed Assessments for Annual Wellness Visit within the encounter summary.**         The following assessments were completed:  · Living Situation  · CAGE  · Depression Screening  · Timed Get Up and Go  · Whisper Test  · Cognitive Function Screening  · Nutrition Screening  · ADL Screening  · PAQ Screening        Vitals:    07/08/22 0946   BP: 116/64   BP Location: Left arm   Patient Position: Sitting   BP Method: Medium (Manual)   Pulse: 76   SpO2: 97%   Weight: 79.9 kg (176 lb 2.4 oz)   Height: 5' 10.5" (1.791 m)     Body mass index is 24.92 kg/m².  Physical Exam  Vitals and nursing note reviewed.   Constitutional:       General: He is not in acute distress.     Appearance: Normal appearance. He is not ill-appearing.   HENT:      Head: Normocephalic and atraumatic.   Eyes:      General: No scleral icterus.        Right eye: No discharge.         Left eye: No discharge.      Extraocular Movements: Extraocular movements intact.      Conjunctiva/sclera: Conjunctivae normal.   Cardiovascular:      Rate and Rhythm: Normal rate and regular rhythm.      Heart sounds: Normal heart sounds. No murmur heard.  Pulmonary:      Effort: Pulmonary effort is normal. No respiratory distress.      Breath sounds: Normal breath sounds. No wheezing, rhonchi or rales.   Musculoskeletal:      Cervical back: Normal range of motion.      Right lower leg: No edema.      Left lower leg: No edema.   Skin:     General: Skin is warm and dry.      Findings: No rash.   Neurological:      Mental Status: He is alert and oriented to person, place, and time.   Psychiatric:         Mood and Affect: Mood normal.         Behavior: Behavior " normal. Behavior is cooperative.         Cognition and Memory: Cognition and memory normal.               Diagnoses and health risks identified today and associated recommendations/orders:    1. Encounter for preventive health examination  - Chart reviewed. Problem list updated. Discussed current medical diagnosis, current medications, medical/surgical/family/social history; updated provider list; documented vital signs; identified any cognitive impairment; and updated risk factor list. Addressed any outstanding health maintenance. Provided patient with personalized health advice. Continue to follow up with PCP and any specialists.       2. Prostate cancer  Chronic; stable on current treatment plan; follow up with PCP  - follows with Oncology     3. Type 2 diabetes mellitus without complication, without long-term current use of insulin  Chronic; stable on current treatment plan; follow up with PCP  - recommend diabetic diet restrictions     4. Hypertension associated with type 2 diabetes mellitus  Chronic; stable on current treatment plan; follow up with PCP  - recommend low sodium diet     5. Type 2 diabetes mellitus with hyperlipidemia  Chronic; stable on current treatment plan; follow up with PCP  - on statin     6. Benign prostatic hyperplasia, unspecified whether lower urinary tract symptoms present  Chronic; stable on current treatment plan; follow up with PCP  - taking flomax     7. Personal history of tobacco use  Chronic; stable on current treatment plan; follow up with PCP  - no longer smoking     8. BMI 24.0-24.9, adult  - Recommendation for healthy diet and increasing exercise as tolerated with goal of 150min/week         Provided Odilon with a 5-10 year written screening schedule and personal prevention plan. Recommendations were developed using the USPSTF age appropriate recommendations. Education, counseling, and referrals were provided as needed. After Visit Summary printed and given to patient which  includes a list of additional screenings\tests needed.    Follow up in about 1 year (around 7/8/2023) for your next annual wellness visit.    Masha Tirado, FNP-C   Advance Care Planning         I offered to discuss advanced care planning, including how to pick a person who would make decisions for you if you were unable to make them for yourself, called a health care power of , and what kind of decisions you might make such as use of life sustaining treatments such as ventilators and tube feeding when faced with a life limiting illness recorded on a living will that they will need to know. (How you want to be cared for as you near the end of your natural life)     X  Patient is unwilling to engage in a discussion regarding advance directives at this time.

## 2022-07-08 ENCOUNTER — OFFICE VISIT (OUTPATIENT)
Dept: INTERNAL MEDICINE | Facility: CLINIC | Age: 72
End: 2022-07-08
Payer: MEDICARE

## 2022-07-08 VITALS
DIASTOLIC BLOOD PRESSURE: 64 MMHG | HEIGHT: 71 IN | HEART RATE: 76 BPM | OXYGEN SATURATION: 97 % | BODY MASS INDEX: 24.66 KG/M2 | SYSTOLIC BLOOD PRESSURE: 116 MMHG | WEIGHT: 176.13 LBS

## 2022-07-08 DIAGNOSIS — E11.9 TYPE 2 DIABETES MELLITUS WITHOUT COMPLICATION, WITHOUT LONG-TERM CURRENT USE OF INSULIN: ICD-10-CM

## 2022-07-08 DIAGNOSIS — Z87.891 PERSONAL HISTORY OF TOBACCO USE: ICD-10-CM

## 2022-07-08 DIAGNOSIS — Z00.00 ENCOUNTER FOR PREVENTIVE HEALTH EXAMINATION: Primary | ICD-10-CM

## 2022-07-08 DIAGNOSIS — E11.69 TYPE 2 DIABETES MELLITUS WITH HYPERLIPIDEMIA: ICD-10-CM

## 2022-07-08 DIAGNOSIS — C61 PROSTATE CANCER: ICD-10-CM

## 2022-07-08 DIAGNOSIS — I15.2 HYPERTENSION ASSOCIATED WITH TYPE 2 DIABETES MELLITUS: ICD-10-CM

## 2022-07-08 DIAGNOSIS — E78.5 TYPE 2 DIABETES MELLITUS WITH HYPERLIPIDEMIA: ICD-10-CM

## 2022-07-08 DIAGNOSIS — N40.0 BENIGN PROSTATIC HYPERPLASIA, UNSPECIFIED WHETHER LOWER URINARY TRACT SYMPTOMS PRESENT: ICD-10-CM

## 2022-07-08 DIAGNOSIS — E11.59 HYPERTENSION ASSOCIATED WITH TYPE 2 DIABETES MELLITUS: ICD-10-CM

## 2022-07-08 PROCEDURE — G0439 PPPS, SUBSEQ VISIT: HCPCS | Mod: S$GLB,,, | Performed by: NURSE PRACTITIONER

## 2022-07-08 PROCEDURE — 1160F RVW MEDS BY RX/DR IN RCRD: CPT | Mod: CPTII,S$GLB,, | Performed by: NURSE PRACTITIONER

## 2022-07-08 PROCEDURE — 1101F PR PT FALLS ASSESS DOC 0-1 FALLS W/OUT INJ PAST YR: ICD-10-PCS | Mod: CPTII,S$GLB,, | Performed by: NURSE PRACTITIONER

## 2022-07-08 PROCEDURE — 4010F PR ACE/ARB THEARPY RXD/TAKEN: ICD-10-PCS | Mod: CPTII,S$GLB,, | Performed by: NURSE PRACTITIONER

## 2022-07-08 PROCEDURE — 1159F MED LIST DOCD IN RCRD: CPT | Mod: CPTII,S$GLB,, | Performed by: NURSE PRACTITIONER

## 2022-07-08 PROCEDURE — 99499 UNLISTED E&M SERVICE: CPT | Mod: S$GLB,,, | Performed by: NURSE PRACTITIONER

## 2022-07-08 PROCEDURE — 3008F BODY MASS INDEX DOCD: CPT | Mod: CPTII,S$GLB,, | Performed by: NURSE PRACTITIONER

## 2022-07-08 PROCEDURE — 99999 PR PBB SHADOW E&M-EST. PATIENT-LVL III: CPT | Mod: PBBFAC,,, | Performed by: NURSE PRACTITIONER

## 2022-07-08 PROCEDURE — 3044F HG A1C LEVEL LT 7.0%: CPT | Mod: CPTII,S$GLB,, | Performed by: NURSE PRACTITIONER

## 2022-07-08 PROCEDURE — 1170F FXNL STATUS ASSESSED: CPT | Mod: CPTII,S$GLB,, | Performed by: NURSE PRACTITIONER

## 2022-07-08 PROCEDURE — 1126F AMNT PAIN NOTED NONE PRSNT: CPT | Mod: CPTII,S$GLB,, | Performed by: NURSE PRACTITIONER

## 2022-07-08 PROCEDURE — 3044F PR MOST RECENT HEMOGLOBIN A1C LEVEL <7.0%: ICD-10-PCS | Mod: CPTII,S$GLB,, | Performed by: NURSE PRACTITIONER

## 2022-07-08 PROCEDURE — 3061F PR NEG MICROALBUMINURIA RESULT DOCUMENTED/REVIEW: ICD-10-PCS | Mod: CPTII,S$GLB,, | Performed by: NURSE PRACTITIONER

## 2022-07-08 PROCEDURE — 4010F ACE/ARB THERAPY RXD/TAKEN: CPT | Mod: CPTII,S$GLB,, | Performed by: NURSE PRACTITIONER

## 2022-07-08 PROCEDURE — 1170F PR FUNCTIONAL STATUS ASSESSED: ICD-10-PCS | Mod: CPTII,S$GLB,, | Performed by: NURSE PRACTITIONER

## 2022-07-08 PROCEDURE — 1160F PR REVIEW ALL MEDS BY PRESCRIBER/CLIN PHARMACIST DOCUMENTED: ICD-10-PCS | Mod: CPTII,S$GLB,, | Performed by: NURSE PRACTITIONER

## 2022-07-08 PROCEDURE — G0439 PR MEDICARE ANNUAL WELLNESS SUBSEQUENT VISIT: ICD-10-PCS | Mod: S$GLB,,, | Performed by: NURSE PRACTITIONER

## 2022-07-08 PROCEDURE — 1126F PR PAIN SEVERITY QUANTIFIED, NO PAIN PRESENT: ICD-10-PCS | Mod: CPTII,S$GLB,, | Performed by: NURSE PRACTITIONER

## 2022-07-08 PROCEDURE — 3078F PR MOST RECENT DIASTOLIC BLOOD PRESSURE < 80 MM HG: ICD-10-PCS | Mod: CPTII,S$GLB,, | Performed by: NURSE PRACTITIONER

## 2022-07-08 PROCEDURE — 99499 RISK ADDL DX/OHS AUDIT: ICD-10-PCS | Mod: S$GLB,,, | Performed by: NURSE PRACTITIONER

## 2022-07-08 PROCEDURE — 3061F NEG MICROALBUMINURIA REV: CPT | Mod: CPTII,S$GLB,, | Performed by: NURSE PRACTITIONER

## 2022-07-08 PROCEDURE — 3288F FALL RISK ASSESSMENT DOCD: CPT | Mod: CPTII,S$GLB,, | Performed by: NURSE PRACTITIONER

## 2022-07-08 PROCEDURE — 3078F DIAST BP <80 MM HG: CPT | Mod: CPTII,S$GLB,, | Performed by: NURSE PRACTITIONER

## 2022-07-08 PROCEDURE — 3074F SYST BP LT 130 MM HG: CPT | Mod: CPTII,S$GLB,, | Performed by: NURSE PRACTITIONER

## 2022-07-08 PROCEDURE — 3066F PR DOCUMENTATION OF TREATMENT FOR NEPHROPATHY: ICD-10-PCS | Mod: CPTII,S$GLB,, | Performed by: NURSE PRACTITIONER

## 2022-07-08 PROCEDURE — 3074F PR MOST RECENT SYSTOLIC BLOOD PRESSURE < 130 MM HG: ICD-10-PCS | Mod: CPTII,S$GLB,, | Performed by: NURSE PRACTITIONER

## 2022-07-08 PROCEDURE — 3066F NEPHROPATHY DOC TX: CPT | Mod: CPTII,S$GLB,, | Performed by: NURSE PRACTITIONER

## 2022-07-08 PROCEDURE — 3288F PR FALLS RISK ASSESSMENT DOCUMENTED: ICD-10-PCS | Mod: CPTII,S$GLB,, | Performed by: NURSE PRACTITIONER

## 2022-07-08 PROCEDURE — 3008F PR BODY MASS INDEX (BMI) DOCUMENTED: ICD-10-PCS | Mod: CPTII,S$GLB,, | Performed by: NURSE PRACTITIONER

## 2022-07-08 PROCEDURE — 1101F PT FALLS ASSESS-DOCD LE1/YR: CPT | Mod: CPTII,S$GLB,, | Performed by: NURSE PRACTITIONER

## 2022-07-08 PROCEDURE — 99999 PR PBB SHADOW E&M-EST. PATIENT-LVL III: ICD-10-PCS | Mod: PBBFAC,,, | Performed by: NURSE PRACTITIONER

## 2022-07-08 PROCEDURE — 1159F PR MEDICATION LIST DOCUMENTED IN MEDICAL RECORD: ICD-10-PCS | Mod: CPTII,S$GLB,, | Performed by: NURSE PRACTITIONER

## 2022-07-08 NOTE — PATIENT INSTRUCTIONS
Counseling and Referral of Other Preventative  (Italic type indicates deductible and co-insurance are waived)    Patient Name: Odilon Wilder  Today's Date: 7/8/2022    Health Maintenance       Date Due Completion Date    Eye Exam 09/19/2020 9/19/2019    Override on 6/6/2018: Done    Colorectal Cancer Screening 07/28/2022 (Originally 1950) 8/24/2020    Pneumococcal Vaccines (Age 65+) (2 - PPSV23 or PCV20) 06/28/2023 (Originally 5/23/2019) 5/23/2018    Shingles Vaccine (1 of 2) 07/08/2023 (Originally 1/10/1969) ---    Influenza Vaccine (1) 09/01/2022 10/12/2020    Hemoglobin A1c 11/10/2022 5/10/2022    Diabetes Urine Screening 05/10/2023 5/10/2022    Lipid Panel 05/10/2023 5/10/2022    Low Dose Statin 06/28/2023 6/28/2022    Foot Exam 06/28/2023 6/28/2022 (Done)    Override on 6/28/2022: Done    Override on 10/12/2020: Done    Override on 5/29/2019: Done    Override on 5/23/2018: Done    TETANUS VACCINE 05/23/2028 5/23/2018 (Declined)    Override on 5/23/2018: Declined        No orders of the defined types were placed in this encounter.    The following information is provided to all patients.  This information is to help you find resources for any of the problems found today that may be affecting your health:                Living healthy guide: www.[facility].[place].gov      Understanding Diabetes: www.diabetes.org      Eating healthy: www.cdc.gov/healthyweight      CDC home safety checklist: www.cdc.gov/steadi/patient.html      Agency on Aging: www.goea.[place].gov      Alcoholics anonymous (AA): www.aa.org      Physical Activity: www.william.nih.gov/tm2elwh      Tobacco use: www.quitwithusla.org

## 2022-07-18 ENCOUNTER — LAB VISIT (OUTPATIENT)
Dept: LAB | Facility: HOSPITAL | Age: 72
End: 2022-07-18
Attending: STUDENT IN AN ORGANIZED HEALTH CARE EDUCATION/TRAINING PROGRAM
Payer: MEDICARE

## 2022-07-18 DIAGNOSIS — C61 PROSTATE CANCER: ICD-10-CM

## 2022-07-18 LAB — COMPLEXED PSA SERPL-MCNC: 1.2 NG/ML (ref 0–4)

## 2022-07-18 PROCEDURE — 84153 ASSAY OF PSA TOTAL: CPT | Performed by: STUDENT IN AN ORGANIZED HEALTH CARE EDUCATION/TRAINING PROGRAM

## 2022-07-18 PROCEDURE — 36415 COLL VENOUS BLD VENIPUNCTURE: CPT | Mod: PO | Performed by: STUDENT IN AN ORGANIZED HEALTH CARE EDUCATION/TRAINING PROGRAM

## 2022-07-25 NOTE — PROGRESS NOTES
Radiation Oncology Follow-up Note        Date of Service: 07/26/2022     Chief Complaint:  unfavorable IR prostate cancer s/p EBRT     Reason for visit: routine post EBRT PSA follow-up     Referring Physician: Dr. Warner (urology)     Implantable devices: denies    Therapy to Date:  Course: C1 PELVIS 2021     Treatment Site Ref. ID Energy Dose/Fx (Gy) #Fx Dose Correction (Gy) Total Dose (Gy) Start Date End Date Elapsed Days   IM Prostate PTV70 6X 2.5 28 / 28 0 70 11/22/2021 1/4/2022 43      Diagnosis/Assessment:   Odilon Wilder is a 71 y.o. man with unfavorable IR GG2 prostate adenocarcinoma (cT1c, PSA 13.6, GS 3+4=7) s/p URONAV prostate biopsy 08/18/2021 with 3/13 cores positive in right middle + base and right apex (target) and MRI showing 171cc gland; no EPE, NVBI, SVI, or LNI - MSK nomogram risk: 48% EPE, 4% SVI, 5% LNI  --  BPH (on Flomax) with LUTS, T2DM and HTN.   His very large prostate causes high PSA with large fluctuations and he could actually be favorable IR. Prolaris came back as 1.6% risk of 10-yrDM using active treatment, placing him in the category of patients benefiting best of single-modality treatment. Patient glad as he had mentioned he preferred no ADT during initial evaluation.  Now s/p 70Gy/28fx to prostate gland completed 01/04/2022.    ECOG 0    PSA continues to trend now, responding well to EBRT, no RT toxicities    Plan   Supposed to repeat colonoscopy soon (poor prep last time)  Check PSA in 6 months with [facility] follow-up        Interval history:       1/24/2022:  post RT toxicity check   RT related G1 urinary toxicity completely resolved.   PSA in 6 months    3/7/2022 PSA 3.0    7/8/2022 PCP visit (PERICO Cai )    7/18/2022 PSA 1.2    Subjective:   He is accompanied by his wife Jacqui .  He has been doing very well.  He denies any urinary issues. In fact his stream has improved compared to prior radiation.  He denies any BM issues. Constipated once in a while. His PCP  believes it's from T2DM.  He still manges properties and remains very active.  His main issues is allergies and takes Flonase.  He is supposed to repeat his colonoscopy soon (poor prep last time)    He denies other main issues.    Review of patient's allergies indicates:   Allergen Reactions    Iodine and iodide containing products     Shellfish containing products        Current Outpatient Medications on File Prior to Visit   Medication Sig Dispense Refill    atorvastatin (LIPITOR) 10 MG tablet Take 1 tablet (10 mg total) by mouth every other day. 45 tablet 3    fluticasone propionate (FLONASE) 50 mcg/actuation nasal spray 1 spray (50 mcg total) by Each Nare route once daily. 1 Bottle 2    glimepiride (AMARYL) 1 MG tablet Take 1 tablet (1 mg total) by mouth before breakfast. 90 tablet 3    lisinopriL (PRINIVIL,ZESTRIL) 2.5 MG tablet TAKE 1 TABLET BY MOUTH ONCE DAILY FOR PROTECTION OF KIDNEY 90 tablet 3    metFORMIN (GLUCOPHAGE) 500 MG tablet Take 1 tablet (500 mg total) by mouth 2 (two) times daily with meals. 180 tablet 3    tamsulosin (FLOMAX) 0.4 mg Cap TAKE 1 CAPSULE BY MOUTH ONCE DAILY. DO NOT CRUSH OR CHEW. SWALLOW WHOLE 90 capsule 3     No current facility-administered medications on file prior to visit.         Review of Systems   Constitutional: Negative for fatigue, fever and unexpected weight change.   HENT: Positive for sinus pressure/congestion. Negative for hearing loss and tinnitus.    Eyes: Negative for visual disturbance.   Respiratory: Positive for cough. Negative for shortness of breath and wheezing.    Cardiovascular: Negative for chest pain, palpitations and leg swelling.   Gastrointestinal: Positive for constipation. Negative for abdominal pain, blood in stool, diarrhea, rectal pain, vomiting and reflux.   Genitourinary: Negative for difficulty urinating, frequency, hematuria and urgency.   Musculoskeletal: Positive for arthralgias. Negative for back pain and neck pain.    Integumentary:  Negative for rash.   Neurological: Positive for numbness. Negative for dizziness, seizures, speech difficulty, weakness, headaches and memory loss.   Psychiatric/Behavioral: Negative for dysphoric mood. The patient is not nervous/anxious.      Objective:   Physical Exam  Vitals reviewed.   Constitutional:       Appearance: Normal appearance.   HENT:      Head: Atraumatic.      Mouth/Throat:      Mouth: Mucous membranes are moist.   Eyes:      Conjunctiva/sclera: Conjunctivae normal.   Cardiovascular:      Comments: Extremities well perfused  Pulmonary:      Effort: Pulmonary effort is normal.   Abdominal:      General: There is no distension.   Genitourinary:     Comments: ANN deferred s/p EBRT  Musculoskeletal:         General: Normal range of motion.      Cervical back: Normal range of motion.   Skin:     General: Skin is warm and dry.   Neurological:      Mental Status: He is alert and oriented to person, place, and time.   Psychiatric:         Mood and Affect: Mood normal.         Behavior: Behavior normal.       Laboratory: I have personally reviewed the patient's available laboratory values and summarized pertinent findings above in HPI.        I spent approximately 30 minutes reviewing the available records and evaluating the patient, out of which over 50% of the time was spent face to face with the patient in counseling and coordinating this patient's care.     Thank you for the opportunity to care for this patient. Please do not hesitate to contact me with any questions.     Ben Thomas MD/PhD

## 2022-07-26 ENCOUNTER — OFFICE VISIT (OUTPATIENT)
Dept: RADIATION ONCOLOGY | Facility: CLINIC | Age: 72
End: 2022-07-26
Payer: MEDICARE

## 2022-07-26 VITALS
HEIGHT: 70 IN | OXYGEN SATURATION: 98 % | WEIGHT: 174 LBS | SYSTOLIC BLOOD PRESSURE: 160 MMHG | BODY MASS INDEX: 24.91 KG/M2 | DIASTOLIC BLOOD PRESSURE: 83 MMHG | HEART RATE: 81 BPM | TEMPERATURE: 98 F | RESPIRATION RATE: 17 BRPM

## 2022-07-26 DIAGNOSIS — C61 PROSTATE CANCER: Primary | ICD-10-CM

## 2022-07-26 PROCEDURE — 3077F SYST BP >= 140 MM HG: CPT | Mod: CPTII,S$GLB,, | Performed by: STUDENT IN AN ORGANIZED HEALTH CARE EDUCATION/TRAINING PROGRAM

## 2022-07-26 PROCEDURE — 3288F PR FALLS RISK ASSESSMENT DOCUMENTED: ICD-10-PCS | Mod: CPTII,S$GLB,, | Performed by: STUDENT IN AN ORGANIZED HEALTH CARE EDUCATION/TRAINING PROGRAM

## 2022-07-26 PROCEDURE — 1126F AMNT PAIN NOTED NONE PRSNT: CPT | Mod: CPTII,S$GLB,, | Performed by: STUDENT IN AN ORGANIZED HEALTH CARE EDUCATION/TRAINING PROGRAM

## 2022-07-26 PROCEDURE — 1160F RVW MEDS BY RX/DR IN RCRD: CPT | Mod: CPTII,S$GLB,, | Performed by: STUDENT IN AN ORGANIZED HEALTH CARE EDUCATION/TRAINING PROGRAM

## 2022-07-26 PROCEDURE — 1159F MED LIST DOCD IN RCRD: CPT | Mod: CPTII,S$GLB,, | Performed by: STUDENT IN AN ORGANIZED HEALTH CARE EDUCATION/TRAINING PROGRAM

## 2022-07-26 PROCEDURE — 3044F PR MOST RECENT HEMOGLOBIN A1C LEVEL <7.0%: ICD-10-PCS | Mod: CPTII,S$GLB,, | Performed by: STUDENT IN AN ORGANIZED HEALTH CARE EDUCATION/TRAINING PROGRAM

## 2022-07-26 PROCEDURE — 99499 UNLISTED E&M SERVICE: CPT | Mod: S$GLB,,, | Performed by: STUDENT IN AN ORGANIZED HEALTH CARE EDUCATION/TRAINING PROGRAM

## 2022-07-26 PROCEDURE — 3008F PR BODY MASS INDEX (BMI) DOCUMENTED: ICD-10-PCS | Mod: CPTII,S$GLB,, | Performed by: STUDENT IN AN ORGANIZED HEALTH CARE EDUCATION/TRAINING PROGRAM

## 2022-07-26 PROCEDURE — 3079F DIAST BP 80-89 MM HG: CPT | Mod: CPTII,S$GLB,, | Performed by: STUDENT IN AN ORGANIZED HEALTH CARE EDUCATION/TRAINING PROGRAM

## 2022-07-26 PROCEDURE — 1126F PR PAIN SEVERITY QUANTIFIED, NO PAIN PRESENT: ICD-10-PCS | Mod: CPTII,S$GLB,, | Performed by: STUDENT IN AN ORGANIZED HEALTH CARE EDUCATION/TRAINING PROGRAM

## 2022-07-26 PROCEDURE — 3044F HG A1C LEVEL LT 7.0%: CPT | Mod: CPTII,S$GLB,, | Performed by: STUDENT IN AN ORGANIZED HEALTH CARE EDUCATION/TRAINING PROGRAM

## 2022-07-26 PROCEDURE — 3066F NEPHROPATHY DOC TX: CPT | Mod: CPTII,S$GLB,, | Performed by: STUDENT IN AN ORGANIZED HEALTH CARE EDUCATION/TRAINING PROGRAM

## 2022-07-26 PROCEDURE — 3061F NEG MICROALBUMINURIA REV: CPT | Mod: CPTII,S$GLB,, | Performed by: STUDENT IN AN ORGANIZED HEALTH CARE EDUCATION/TRAINING PROGRAM

## 2022-07-26 PROCEDURE — 1101F PT FALLS ASSESS-DOCD LE1/YR: CPT | Mod: CPTII,S$GLB,, | Performed by: STUDENT IN AN ORGANIZED HEALTH CARE EDUCATION/TRAINING PROGRAM

## 2022-07-26 PROCEDURE — 99999 PR PBB SHADOW E&M-EST. PATIENT-LVL III: CPT | Mod: PBBFAC,,, | Performed by: STUDENT IN AN ORGANIZED HEALTH CARE EDUCATION/TRAINING PROGRAM

## 2022-07-26 PROCEDURE — 1159F PR MEDICATION LIST DOCUMENTED IN MEDICAL RECORD: ICD-10-PCS | Mod: CPTII,S$GLB,, | Performed by: STUDENT IN AN ORGANIZED HEALTH CARE EDUCATION/TRAINING PROGRAM

## 2022-07-26 PROCEDURE — 1101F PR PT FALLS ASSESS DOC 0-1 FALLS W/OUT INJ PAST YR: ICD-10-PCS | Mod: CPTII,S$GLB,, | Performed by: STUDENT IN AN ORGANIZED HEALTH CARE EDUCATION/TRAINING PROGRAM

## 2022-07-26 PROCEDURE — 3288F FALL RISK ASSESSMENT DOCD: CPT | Mod: CPTII,S$GLB,, | Performed by: STUDENT IN AN ORGANIZED HEALTH CARE EDUCATION/TRAINING PROGRAM

## 2022-07-26 PROCEDURE — 3079F PR MOST RECENT DIASTOLIC BLOOD PRESSURE 80-89 MM HG: ICD-10-PCS | Mod: CPTII,S$GLB,, | Performed by: STUDENT IN AN ORGANIZED HEALTH CARE EDUCATION/TRAINING PROGRAM

## 2022-07-26 PROCEDURE — 3008F BODY MASS INDEX DOCD: CPT | Mod: CPTII,S$GLB,, | Performed by: STUDENT IN AN ORGANIZED HEALTH CARE EDUCATION/TRAINING PROGRAM

## 2022-07-26 PROCEDURE — 99214 PR OFFICE/OUTPT VISIT, EST, LEVL IV, 30-39 MIN: ICD-10-PCS | Mod: S$GLB,,, | Performed by: STUDENT IN AN ORGANIZED HEALTH CARE EDUCATION/TRAINING PROGRAM

## 2022-07-26 PROCEDURE — 3066F PR DOCUMENTATION OF TREATMENT FOR NEPHROPATHY: ICD-10-PCS | Mod: CPTII,S$GLB,, | Performed by: STUDENT IN AN ORGANIZED HEALTH CARE EDUCATION/TRAINING PROGRAM

## 2022-07-26 PROCEDURE — 3077F PR MOST RECENT SYSTOLIC BLOOD PRESSURE >= 140 MM HG: ICD-10-PCS | Mod: CPTII,S$GLB,, | Performed by: STUDENT IN AN ORGANIZED HEALTH CARE EDUCATION/TRAINING PROGRAM

## 2022-07-26 PROCEDURE — 99214 OFFICE O/P EST MOD 30 MIN: CPT | Mod: S$GLB,,, | Performed by: STUDENT IN AN ORGANIZED HEALTH CARE EDUCATION/TRAINING PROGRAM

## 2022-07-26 PROCEDURE — 4010F ACE/ARB THERAPY RXD/TAKEN: CPT | Mod: CPTII,S$GLB,, | Performed by: STUDENT IN AN ORGANIZED HEALTH CARE EDUCATION/TRAINING PROGRAM

## 2022-07-26 PROCEDURE — 3061F PR NEG MICROALBUMINURIA RESULT DOCUMENTED/REVIEW: ICD-10-PCS | Mod: CPTII,S$GLB,, | Performed by: STUDENT IN AN ORGANIZED HEALTH CARE EDUCATION/TRAINING PROGRAM

## 2022-07-26 PROCEDURE — 4010F PR ACE/ARB THEARPY RXD/TAKEN: ICD-10-PCS | Mod: CPTII,S$GLB,, | Performed by: STUDENT IN AN ORGANIZED HEALTH CARE EDUCATION/TRAINING PROGRAM

## 2022-07-26 PROCEDURE — 1160F PR REVIEW ALL MEDS BY PRESCRIBER/CLIN PHARMACIST DOCUMENTED: ICD-10-PCS | Mod: CPTII,S$GLB,, | Performed by: STUDENT IN AN ORGANIZED HEALTH CARE EDUCATION/TRAINING PROGRAM

## 2022-07-26 PROCEDURE — 99999 PR PBB SHADOW E&M-EST. PATIENT-LVL III: ICD-10-PCS | Mod: PBBFAC,,, | Performed by: STUDENT IN AN ORGANIZED HEALTH CARE EDUCATION/TRAINING PROGRAM

## 2022-07-26 PROCEDURE — 99499 RISK ADDL DX/OHS AUDIT: ICD-10-PCS | Mod: S$GLB,,, | Performed by: STUDENT IN AN ORGANIZED HEALTH CARE EDUCATION/TRAINING PROGRAM

## 2022-07-29 ENCOUNTER — PATIENT OUTREACH (OUTPATIENT)
Dept: ADMINISTRATIVE | Facility: HOSPITAL | Age: 72
End: 2022-07-29
Payer: MEDICARE

## 2022-09-12 ENCOUNTER — PATIENT OUTREACH (OUTPATIENT)
Dept: ADMINISTRATIVE | Facility: HOSPITAL | Age: 72
End: 2022-09-12
Payer: MEDICARE

## 2022-11-11 ENCOUNTER — PATIENT OUTREACH (OUTPATIENT)
Dept: ADMINISTRATIVE | Facility: HOSPITAL | Age: 72
End: 2022-11-11
Payer: MEDICARE

## 2022-11-28 ENCOUNTER — LAB VISIT (OUTPATIENT)
Dept: LAB | Facility: HOSPITAL | Age: 72
End: 2022-11-28
Attending: FAMILY MEDICINE
Payer: MEDICARE

## 2022-11-28 DIAGNOSIS — I10 ESSENTIAL HYPERTENSION: ICD-10-CM

## 2022-11-28 DIAGNOSIS — E11.9 TYPE 2 DIABETES MELLITUS WITHOUT COMPLICATION, UNSPECIFIED WHETHER LONG TERM INSULIN USE: ICD-10-CM

## 2022-11-28 LAB
ESTIMATED AVG GLUCOSE: 94 MG/DL (ref 68–131)
HBA1C MFR BLD: 4.9 % (ref 4–5.6)

## 2022-11-28 PROCEDURE — 36415 COLL VENOUS BLD VENIPUNCTURE: CPT | Mod: PO | Performed by: FAMILY MEDICINE

## 2022-11-28 PROCEDURE — 83036 HEMOGLOBIN GLYCOSYLATED A1C: CPT | Performed by: FAMILY MEDICINE

## 2022-11-29 ENCOUNTER — TELEPHONE (OUTPATIENT)
Dept: FAMILY MEDICINE | Facility: CLINIC | Age: 72
End: 2022-11-29
Payer: MEDICARE

## 2022-11-29 NOTE — TELEPHONE ENCOUNTER
----- Message from Ryley Porter MD sent at 11/29/2022 12:13 AM CST -----  Letter to you has been generated

## 2023-01-26 ENCOUNTER — LAB VISIT (OUTPATIENT)
Dept: LAB | Facility: HOSPITAL | Age: 73
End: 2023-01-26
Attending: STUDENT IN AN ORGANIZED HEALTH CARE EDUCATION/TRAINING PROGRAM
Payer: MEDICARE

## 2023-01-26 DIAGNOSIS — C61 PROSTATE CANCER: ICD-10-CM

## 2023-01-26 LAB — COMPLEXED PSA SERPL-MCNC: 0.89 NG/ML (ref 0–4)

## 2023-01-26 PROCEDURE — 84153 ASSAY OF PSA TOTAL: CPT | Performed by: STUDENT IN AN ORGANIZED HEALTH CARE EDUCATION/TRAINING PROGRAM

## 2023-01-26 PROCEDURE — 36415 COLL VENOUS BLD VENIPUNCTURE: CPT | Mod: PO | Performed by: STUDENT IN AN ORGANIZED HEALTH CARE EDUCATION/TRAINING PROGRAM

## 2023-03-02 ENCOUNTER — PES CALL (OUTPATIENT)
Dept: ADMINISTRATIVE | Facility: CLINIC | Age: 73
End: 2023-03-02
Payer: MEDICARE

## 2023-04-04 LAB
LEFT EYE DM RETINOPATHY: POSITIVE
RIGHT EYE DM RETINOPATHY: POSITIVE

## 2023-04-17 ENCOUNTER — PATIENT OUTREACH (OUTPATIENT)
Dept: ADMINISTRATIVE | Facility: HOSPITAL | Age: 73
End: 2023-04-17
Payer: MEDICARE

## 2023-05-09 ENCOUNTER — PES CALL (OUTPATIENT)
Dept: ADMINISTRATIVE | Facility: CLINIC | Age: 73
End: 2023-05-09
Payer: MEDICARE

## 2023-05-26 ENCOUNTER — LAB VISIT (OUTPATIENT)
Dept: LAB | Facility: HOSPITAL | Age: 73
End: 2023-05-26
Attending: FAMILY MEDICINE
Payer: MEDICARE

## 2023-05-26 DIAGNOSIS — E11.9 TYPE 2 DIABETES MELLITUS WITHOUT COMPLICATION, UNSPECIFIED WHETHER LONG TERM INSULIN USE: ICD-10-CM

## 2023-05-26 DIAGNOSIS — I10 ESSENTIAL HYPERTENSION: ICD-10-CM

## 2023-05-26 LAB
ALBUMIN SERPL BCP-MCNC: 4.6 G/DL (ref 3.5–5.2)
ALP SERPL-CCNC: 49 U/L (ref 38–126)
ALT SERPL W/O P-5'-P-CCNC: 17 U/L (ref 10–44)
ANION GAP SERPL CALC-SCNC: 7 MMOL/L (ref 8–16)
AST SERPL-CCNC: 24 U/L (ref 15–46)
BASOPHILS # BLD AUTO: 0.03 K/UL (ref 0–0.2)
BASOPHILS NFR BLD: 0.6 % (ref 0–1.9)
BILIRUB SERPL-MCNC: 1 MG/DL (ref 0.1–1)
CALCIUM SERPL-MCNC: 10.4 MG/DL (ref 8.7–10.5)
CHLORIDE SERPL-SCNC: 101 MMOL/L (ref 95–110)
CHOLEST SERPL-MCNC: 130 MG/DL (ref 120–199)
CHOLEST/HDLC SERPL: 3.6 {RATIO} (ref 2–5)
CO2 SERPL-SCNC: 33 MMOL/L (ref 23–29)
CREAT SERPL-MCNC: 1.33 MG/DL (ref 0.5–1.4)
DIFFERENTIAL METHOD: ABNORMAL
EOSINOPHIL # BLD AUTO: 0.3 K/UL (ref 0–0.5)
EOSINOPHIL NFR BLD: 6.3 % (ref 0–8)
ERYTHROCYTE [DISTWIDTH] IN BLOOD BY AUTOMATED COUNT: 11.3 % (ref 11.5–14.5)
EST. GFR  (NO RACE VARIABLE): 56.4 ML/MIN/1.73 M^2
ESTIMATED AVG GLUCOSE: 91 MG/DL (ref 68–131)
GLUCOSE SERPL-MCNC: 82 MG/DL (ref 70–110)
HBA1C MFR BLD: 4.8 % (ref 4–5.6)
HCT VFR BLD AUTO: 45.4 % (ref 40–54)
HDLC SERPL-MCNC: 36 MG/DL (ref 40–75)
HDLC SERPL: 27.7 % (ref 20–50)
HGB BLD-MCNC: 14.8 G/DL (ref 14–18)
IMM GRANULOCYTES # BLD AUTO: 0.02 K/UL (ref 0–0.04)
IMM GRANULOCYTES NFR BLD AUTO: 0.4 % (ref 0–0.5)
LDLC SERPL CALC-MCNC: 73.4 MG/DL (ref 63–159)
LYMPHOCYTES # BLD AUTO: 1.2 K/UL (ref 1–4.8)
LYMPHOCYTES NFR BLD: 24.4 % (ref 18–48)
MCH RBC QN AUTO: 31.6 PG (ref 27–31)
MCHC RBC AUTO-ENTMCNC: 32.6 G/DL (ref 32–36)
MCV RBC AUTO: 97 FL (ref 82–98)
MONOCYTES # BLD AUTO: 0.4 K/UL (ref 0.3–1)
MONOCYTES NFR BLD: 9.3 % (ref 4–15)
NEUTROPHILS # BLD AUTO: 2.8 K/UL (ref 1.8–7.7)
NEUTROPHILS NFR BLD: 59 % (ref 38–73)
NONHDLC SERPL-MCNC: 94 MG/DL
NRBC BLD-RTO: 0 /100 WBC
PLATELET # BLD AUTO: 226 K/UL (ref 150–450)
PMV BLD AUTO: 9.8 FL (ref 9.2–12.9)
POTASSIUM SERPL-SCNC: 4.5 MMOL/L (ref 3.5–5.1)
PROT SERPL-MCNC: 7.6 G/DL (ref 6–8.4)
RBC # BLD AUTO: 4.69 M/UL (ref 4.6–6.2)
SODIUM SERPL-SCNC: 141 MMOL/L (ref 136–145)
TRIGL SERPL-MCNC: 103 MG/DL (ref 30–150)
TSH SERPL DL<=0.005 MIU/L-ACNC: 1.01 UIU/ML (ref 0.4–4)
UUN UR-MCNC: 26 MG/DL (ref 2–20)
WBC # BLD AUTO: 4.75 K/UL (ref 3.9–12.7)

## 2023-05-26 PROCEDURE — 85025 COMPLETE CBC W/AUTO DIFF WBC: CPT | Mod: PO | Performed by: FAMILY MEDICINE

## 2023-05-26 PROCEDURE — 80053 COMPREHEN METABOLIC PANEL: CPT | Mod: PO | Performed by: FAMILY MEDICINE

## 2023-05-26 PROCEDURE — 80061 LIPID PANEL: CPT | Performed by: FAMILY MEDICINE

## 2023-05-26 PROCEDURE — 36415 COLL VENOUS BLD VENIPUNCTURE: CPT | Mod: PO | Performed by: FAMILY MEDICINE

## 2023-05-26 PROCEDURE — 84443 ASSAY THYROID STIM HORMONE: CPT | Mod: PO | Performed by: FAMILY MEDICINE

## 2023-05-26 PROCEDURE — 83036 HEMOGLOBIN GLYCOSYLATED A1C: CPT | Performed by: FAMILY MEDICINE

## 2023-05-29 ENCOUNTER — OFFICE VISIT (OUTPATIENT)
Dept: FAMILY MEDICINE | Facility: CLINIC | Age: 73
End: 2023-05-29
Payer: MEDICARE

## 2023-05-29 VITALS
WEIGHT: 170.94 LBS | HEIGHT: 70 IN | BODY MASS INDEX: 24.47 KG/M2 | OXYGEN SATURATION: 95 % | SYSTOLIC BLOOD PRESSURE: 122 MMHG | DIASTOLIC BLOOD PRESSURE: 62 MMHG | TEMPERATURE: 98 F | HEART RATE: 94 BPM

## 2023-05-29 DIAGNOSIS — E78.5 TYPE 2 DIABETES MELLITUS WITH HYPERLIPIDEMIA: ICD-10-CM

## 2023-05-29 DIAGNOSIS — H61.20 IMPACTED CERUMEN, UNSPECIFIED LATERALITY: ICD-10-CM

## 2023-05-29 DIAGNOSIS — Z12.11 ENCOUNTER FOR SCREENING FOR MALIGNANT NEOPLASM OF COLON: ICD-10-CM

## 2023-05-29 DIAGNOSIS — E11.69 TYPE 2 DIABETES MELLITUS WITH HYPERLIPIDEMIA: ICD-10-CM

## 2023-05-29 DIAGNOSIS — E11.59 HYPERTENSION ASSOCIATED WITH TYPE 2 DIABETES MELLITUS: Primary | ICD-10-CM

## 2023-05-29 DIAGNOSIS — N18.31 STAGE 3A CHRONIC KIDNEY DISEASE: ICD-10-CM

## 2023-05-29 DIAGNOSIS — R05.8 ALLERGIC COUGH: ICD-10-CM

## 2023-05-29 DIAGNOSIS — I15.2 HYPERTENSION ASSOCIATED WITH TYPE 2 DIABETES MELLITUS: Primary | ICD-10-CM

## 2023-05-29 DIAGNOSIS — C61 PROSTATE CANCER: ICD-10-CM

## 2023-05-29 DIAGNOSIS — J30.1 ALLERGIC RHINITIS DUE TO POLLEN, UNSPECIFIED SEASONALITY: ICD-10-CM

## 2023-05-29 PROCEDURE — 3074F SYST BP LT 130 MM HG: CPT | Mod: CPTII,S$GLB,, | Performed by: FAMILY MEDICINE

## 2023-05-29 PROCEDURE — 3288F PR FALLS RISK ASSESSMENT DOCUMENTED: ICD-10-PCS | Mod: CPTII,S$GLB,, | Performed by: FAMILY MEDICINE

## 2023-05-29 PROCEDURE — 3078F PR MOST RECENT DIASTOLIC BLOOD PRESSURE < 80 MM HG: ICD-10-PCS | Mod: CPTII,S$GLB,, | Performed by: FAMILY MEDICINE

## 2023-05-29 PROCEDURE — 1126F PR PAIN SEVERITY QUANTIFIED, NO PAIN PRESENT: ICD-10-PCS | Mod: CPTII,S$GLB,, | Performed by: FAMILY MEDICINE

## 2023-05-29 PROCEDURE — 3078F DIAST BP <80 MM HG: CPT | Mod: CPTII,S$GLB,, | Performed by: FAMILY MEDICINE

## 2023-05-29 PROCEDURE — 3288F FALL RISK ASSESSMENT DOCD: CPT | Mod: CPTII,S$GLB,, | Performed by: FAMILY MEDICINE

## 2023-05-29 PROCEDURE — 99214 PR OFFICE/OUTPT VISIT, EST, LEVL IV, 30-39 MIN: ICD-10-PCS | Mod: S$GLB,,, | Performed by: FAMILY MEDICINE

## 2023-05-29 PROCEDURE — 4010F ACE/ARB THERAPY RXD/TAKEN: CPT | Mod: CPTII,S$GLB,, | Performed by: FAMILY MEDICINE

## 2023-05-29 PROCEDURE — 4010F PR ACE/ARB THEARPY RXD/TAKEN: ICD-10-PCS | Mod: CPTII,S$GLB,, | Performed by: FAMILY MEDICINE

## 2023-05-29 PROCEDURE — 3044F PR MOST RECENT HEMOGLOBIN A1C LEVEL <7.0%: ICD-10-PCS | Mod: CPTII,S$GLB,, | Performed by: FAMILY MEDICINE

## 2023-05-29 PROCEDURE — 3008F BODY MASS INDEX DOCD: CPT | Mod: CPTII,S$GLB,, | Performed by: FAMILY MEDICINE

## 2023-05-29 PROCEDURE — 1160F PR REVIEW ALL MEDS BY PRESCRIBER/CLIN PHARMACIST DOCUMENTED: ICD-10-PCS | Mod: CPTII,S$GLB,, | Performed by: FAMILY MEDICINE

## 2023-05-29 PROCEDURE — 3044F HG A1C LEVEL LT 7.0%: CPT | Mod: CPTII,S$GLB,, | Performed by: FAMILY MEDICINE

## 2023-05-29 PROCEDURE — 3008F PR BODY MASS INDEX (BMI) DOCUMENTED: ICD-10-PCS | Mod: CPTII,S$GLB,, | Performed by: FAMILY MEDICINE

## 2023-05-29 PROCEDURE — 1101F PR PT FALLS ASSESS DOC 0-1 FALLS W/OUT INJ PAST YR: ICD-10-PCS | Mod: CPTII,S$GLB,, | Performed by: FAMILY MEDICINE

## 2023-05-29 PROCEDURE — 99214 OFFICE O/P EST MOD 30 MIN: CPT | Mod: S$GLB,,, | Performed by: FAMILY MEDICINE

## 2023-05-29 PROCEDURE — 1160F RVW MEDS BY RX/DR IN RCRD: CPT | Mod: CPTII,S$GLB,, | Performed by: FAMILY MEDICINE

## 2023-05-29 PROCEDURE — 3074F PR MOST RECENT SYSTOLIC BLOOD PRESSURE < 130 MM HG: ICD-10-PCS | Mod: CPTII,S$GLB,, | Performed by: FAMILY MEDICINE

## 2023-05-29 PROCEDURE — 1159F PR MEDICATION LIST DOCUMENTED IN MEDICAL RECORD: ICD-10-PCS | Mod: CPTII,S$GLB,, | Performed by: FAMILY MEDICINE

## 2023-05-29 PROCEDURE — 1101F PT FALLS ASSESS-DOCD LE1/YR: CPT | Mod: CPTII,S$GLB,, | Performed by: FAMILY MEDICINE

## 2023-05-29 PROCEDURE — 1126F AMNT PAIN NOTED NONE PRSNT: CPT | Mod: CPTII,S$GLB,, | Performed by: FAMILY MEDICINE

## 2023-05-29 PROCEDURE — 1159F MED LIST DOCD IN RCRD: CPT | Mod: CPTII,S$GLB,, | Performed by: FAMILY MEDICINE

## 2023-05-29 RX ORDER — MONTELUKAST SODIUM 10 MG/1
10 TABLET ORAL NIGHTLY
Qty: 90 TABLET | Refills: 0 | Status: SHIPPED | OUTPATIENT
Start: 2023-05-29 | End: 2023-06-28

## 2023-05-29 NOTE — PROGRESS NOTES
" Patient ID: Odilon Wilder is a 73 y.o. male.    Chief Complaint: Annual Exam    HPI       Odilon Wilder is a 73 y.o. male here for annual exam followed for lipidemia allergic rhinitis diabetes mellitus prostate cancer.  All stable this time except for cough.  Complains of a cough in the morning that is mostly nonproductive.  Aggravating a lot of times especially early morning when trying to cough up some of the postnasal drip.  Some sinus congestion using Flonase on a daily basis is not helping.      Review of Symptoms        Physical Exam    Vitals:    05/29/23 0848   BP: 122/62   BP Location: Left arm   Patient Position: Sitting   Pulse: 94   Temp: 98 °F (36.7 °C)   TempSrc: Oral   SpO2: 95%   Weight: 77.6 kg (170 lb 15.5 oz)   Height: 5' 10" (1.778 m)        Constitutional:   Oriented to person, place, and time.appears well-developed and well-nourished.   No distress.     HENT:   Head: Normocephalic and atraumatic.     Right Ear: Tympanic membrane, external ear and ear canal cerumen     Left Ear: Tympanic membrane, external ear and ear canal cerumen    Nose: External Normal. Normal turbinates, No rhinorrhea     Mouth/Throat: Uvula is midline, oropharynx is clear and moist and mucous membranes are normal.     Neck: supple no anterior cervical adenopathy      Eyes:   Conjunctivae are normal. Right eye exhibits no discharge. Left eye exhibits no discharge. No scleral icterus. No periorbital edema     Cardiovascular:  Regular rate and rhythm with normal S1 and S2     Pulmonary/Chest:   Clear to auscultation bilaterally without wheezes, rhonchi or rales    Musculoskeletal:  No edema. No obvious deformity No wasting     Neurological:   Alert and oriented to person, place, and time. Coordination normal.     Skin:   Skin is warm and dry.   No diaphoresis.   No rash noted.    Psychiatric: Normal mood and affect. Behavior is normal. Judgment and thought content normal.       Assessment / Plan:      ICD-10-CM ICD-9-CM "   1. Hypertension associated with type 2 diabetes mellitus  E11.59 250.80    I15.2 401.9   2. Type 2 diabetes mellitus with hyperlipidemia  E11.69 250.80    E78.5 272.4   3. Encounter for screening for malignant neoplasm of colon  Z12.11 V76.51   4. Stage 3a chronic kidney disease  N18.31 585.3   5. Allergic rhinitis due to pollen, unspecified seasonality  J30.1 477.0   6. Allergic cough  R05.8 786.2   7. Impacted cerumen, unspecified laterality  H61.20 380.4   8. Prostate cancer  C61 185     Hypertension associated with type 2 diabetes mellitus  -     Comprehensive Metabolic Panel; Future  -     CBC Auto Differential; Future  -     Lipid Panel; Future  -     TSH; Future    Type 2 diabetes mellitus with hyperlipidemia  -     Comprehensive Metabolic Panel; Future  -     CBC Auto Differential; Future  -     Lipid Panel; Future  -     TSH; Future  -     Hemoglobin A1C; Future; Expected date: 05/29/2023  -     Hemoglobin A1C; Future; Expected date: 05/29/2023  -     Microalbumin/Creatinine Ratio, Urine; Future; Expected date: 05/29/2023    Encounter for screening for malignant neoplasm of colon  -     Case Request Endoscopy: COLONOSCOPY    Stage 3a chronic kidney disease  -     Basic Metabolic Panel; Future; Expected date: 05/29/2023  -     Comprehensive Metabolic Panel; Future  -     CBC Auto Differential; Future  -     Lipid Panel; Future  -     TSH; Future    Allergic rhinitis due to pollen, unspecified seasonality    Allergic cough    Impacted cerumen, unspecified laterality    Prostate cancer    Other orders  -     montelukast (SINGULAIR) 10 mg tablet; Take 1 tablet (10 mg total) by mouth every evening. For allergies and cough  Dispense: 90 tablet; Refill: 0    Bilateral cerumen impaction-suggested ear wax cleaning kit-return to clinic in two weeks for evaluation make sure it is gone.      Also return to clinic because of cough you Singulair daily and change allergic rhinitis Flonase two evening.  Prostate cancer  followed by others

## 2023-06-13 LAB
LEFT EYE DM RETINOPATHY: POSITIVE
RIGHT EYE DM RETINOPATHY: POSITIVE

## 2023-06-20 ENCOUNTER — PATIENT OUTREACH (OUTPATIENT)
Dept: ADMINISTRATIVE | Facility: HOSPITAL | Age: 73
End: 2023-06-20
Payer: MEDICARE

## 2023-06-21 ENCOUNTER — PES CALL (OUTPATIENT)
Dept: ADMINISTRATIVE | Facility: CLINIC | Age: 73
End: 2023-06-21
Payer: MEDICARE

## 2023-06-30 LAB
LEFT EYE DM RETINOPATHY: POSITIVE
RIGHT EYE DM RETINOPATHY: POSITIVE

## 2023-07-05 RX ORDER — ATORVASTATIN CALCIUM 10 MG/1
TABLET, FILM COATED ORAL
Qty: 45 TABLET | Refills: 3 | Status: SHIPPED | OUTPATIENT
Start: 2023-07-05

## 2023-07-05 NOTE — TELEPHONE ENCOUNTER
Refill Decision Note   Odilon Wilder  is requesting a refill authorization.  Brief Assessment and Rationale for Refill:  Approve     Medication Therapy Plan:         Comments:     No Care Gaps recommended.     Note composed:6:29 PM 07/05/2023

## 2023-07-05 NOTE — TELEPHONE ENCOUNTER
No care due was identified.  [facility] Embedded Care Due Messages. Reference number: [id number].   7/05/2023 2:45:24 PM CDT

## 2023-07-07 ENCOUNTER — TELEPHONE (OUTPATIENT)
Dept: GASTROENTEROLOGY | Facility: CLINIC | Age: 73
End: 2023-07-07
Payer: MEDICARE

## 2023-07-07 RX ORDER — POLYETHYLENE GLYCOL 3350, SODIUM SULFATE ANHYDROUS, SODIUM BICARBONATE, SODIUM CHLORIDE, POTASSIUM CHLORIDE 236; 22.74; 6.74; 5.86; 2.97 G/4L; G/4L; G/4L; G/4L; G/4L
4 POWDER, FOR SOLUTION ORAL ONCE
Qty: 1 EACH | Refills: 0 | Status: CANCELLED | OUTPATIENT
Start: 2023-07-07 | End: 2023-07-07

## 2023-07-07 NOTE — TELEPHONE ENCOUNTER
Contacted patient to schedule colonoscopy. Patient does not want to come to [place]. Case transferred to [place].

## 2023-07-13 ENCOUNTER — PES CALL (OUTPATIENT)
Dept: ADMINISTRATIVE | Facility: CLINIC | Age: 73
End: 2023-07-13
Payer: MEDICARE

## 2023-07-18 ENCOUNTER — PATIENT OUTREACH (OUTPATIENT)
Dept: ADMINISTRATIVE | Facility: HOSPITAL | Age: 73
End: 2023-07-18
Payer: MEDICARE

## 2023-07-27 ENCOUNTER — TELEPHONE (OUTPATIENT)
Dept: ADMINISTRATIVE | Facility: CLINIC | Age: 73
End: 2023-07-27
Payer: MEDICARE

## 2023-07-27 NOTE — PROGRESS NOTES
Left detailed information on canceling eAWV since it was already done with [facility] on 03/08/23 with [facility] with Pauline Toussaint NP. josie.

## 2023-08-29 LAB
LEFT EYE DM RETINOPATHY: POSITIVE
RIGHT EYE DM RETINOPATHY: POSITIVE

## 2023-09-05 ENCOUNTER — PATIENT OUTREACH (OUTPATIENT)
Dept: ADMINISTRATIVE | Facility: HOSPITAL | Age: 73
End: 2023-09-05
Payer: MEDICARE

## 2023-09-27 RX ORDER — GLIMEPIRIDE 1 MG/1
1 TABLET ORAL
Qty: 90 TABLET | Refills: 0 | Status: SHIPPED | OUTPATIENT
Start: 2023-09-27 | End: 2024-01-17

## 2023-09-27 RX ORDER — LISINOPRIL 2.5 MG/1
TABLET ORAL
Qty: 90 TABLET | Refills: 2 | Status: SHIPPED | OUTPATIENT
Start: 2023-09-27

## 2023-09-27 NOTE — TELEPHONE ENCOUNTER
Provider Staff:  Action required for this patient     Please see care gap opportunities below in Care Due Message.    Thanks!  [facility]     Appointments      Date Provider   Last Visit   5/29/2023 Ryley Porter MD   Next Visit   Visit date not found Ryley Porter MD     Refill Decision Note   Odilon Wilder  is requesting a refill authorization.  Brief Assessment and Rationale for Refill:  Approve     Medication Therapy Plan:  eGFR reviewed. Dose has been low and the same >12 mos and seems to be tolerated.  Renal dosing not needed      Pharmacist review requested: Yes   Comments:     Note composed:3:47 PM 09/27/2023

## 2023-09-27 NOTE — TELEPHONE ENCOUNTER
Care Due:                  Date            Visit Type   Department     Provider  --------------------------------------------------------------------------------                                EP -                              PRIMARY      [facility] FAMILY  Last Visit: 05-      CARE ([facility])   MEDICINE       Ryley Porter                              EP -                              PRIMARY      [facility] FAMILY  Next Visit: 05-      CARE ([facility])   MEDICINE       Ryley Porter                                                            Last  Test          Frequency    Reason                     Performed    Due Date  --------------------------------------------------------------------------------    HBA1C.......  6 months...  glimepiride, metFORMIN...  05- 11-    [facility] Embedded Care Due Messages. Reference number: [id number].   9/27/2023 10:34:18 AM CDT

## 2023-09-27 NOTE — TELEPHONE ENCOUNTER
Refill Routing Note   Medication(s) are not appropriate for processing by [facility] for the following reason(s):      Required labs abnormal    [facility] action(s):  Defer  Approve Care Due:  Labs due        Pharmacist review requested: Yes     Appointments  past 12m or future 3m with PCP    Date Provider   Last Visit   5/29/2023 Ryley Porter MD   Next Visit   Visit date not found Ryley Porter MD   ED visits in past 90 days: 0        Note composed:3:25 PM 09/27/2023

## 2023-10-06 ENCOUNTER — PATIENT OUTREACH (OUTPATIENT)
Dept: ADMINISTRATIVE | Facility: HOSPITAL | Age: 73
End: 2023-10-06
Payer: MEDICARE

## 2023-11-28 ENCOUNTER — LAB VISIT (OUTPATIENT)
Dept: LAB | Facility: HOSPITAL | Age: 73
End: 2023-11-28
Attending: FAMILY MEDICINE
Payer: MEDICARE

## 2023-11-28 DIAGNOSIS — N18.31 STAGE 3A CHRONIC KIDNEY DISEASE: ICD-10-CM

## 2023-11-28 DIAGNOSIS — E11.69 TYPE 2 DIABETES MELLITUS WITH HYPERLIPIDEMIA: ICD-10-CM

## 2023-11-28 DIAGNOSIS — E78.5 TYPE 2 DIABETES MELLITUS WITH HYPERLIPIDEMIA: ICD-10-CM

## 2023-11-28 LAB
ANION GAP SERPL CALC-SCNC: 8 MMOL/L (ref 8–16)
CALCIUM SERPL-MCNC: 10.2 MG/DL (ref 8.7–10.5)
CHLORIDE SERPL-SCNC: 101 MMOL/L (ref 95–110)
CO2 SERPL-SCNC: 32 MMOL/L (ref 23–29)
CREAT SERPL-MCNC: 1.17 MG/DL (ref 0.5–1.4)
EST. GFR  (NO RACE VARIABLE): >60 ML/MIN/1.73 M^2
ESTIMATED AVG GLUCOSE: 91 MG/DL (ref 68–131)
GLUCOSE SERPL-MCNC: 108 MG/DL (ref 70–110)
HBA1C MFR BLD: 4.8 % (ref 4–5.6)
POTASSIUM SERPL-SCNC: 4.4 MMOL/L (ref 3.5–5.1)
SODIUM SERPL-SCNC: 141 MMOL/L (ref 136–145)
UUN UR-MCNC: 19 MG/DL (ref 2–20)

## 2023-11-28 PROCEDURE — 36415 COLL VENOUS BLD VENIPUNCTURE: CPT | Mod: PN | Performed by: FAMILY MEDICINE

## 2023-11-28 PROCEDURE — 83036 HEMOGLOBIN GLYCOSYLATED A1C: CPT | Performed by: FAMILY MEDICINE

## 2023-11-28 PROCEDURE — 80048 BASIC METABOLIC PNL TOTAL CA: CPT | Mod: PN | Performed by: FAMILY MEDICINE

## 2024-01-17 RX ORDER — GLIMEPIRIDE 1 MG/1
1 TABLET ORAL
Qty: 90 TABLET | Refills: 0 | Status: SHIPPED | OUTPATIENT
Start: 2024-01-17 | End: 2024-04-13

## 2024-01-17 NOTE — TELEPHONE ENCOUNTER
No care due was identified.  [facility] Embedded Care Due Messages. Reference number: [id number].   1/17/2024 8:17:40 AM CST

## 2024-01-18 NOTE — TELEPHONE ENCOUNTER
Refill Decision Note   Odilon Wilder  is requesting a refill authorization.  Brief Assessment and Rationale for Refill:  Approve     Medication Therapy Plan:         Comments:     Note composed:8:40 PM 01/17/2024

## 2024-04-12 NOTE — TELEPHONE ENCOUNTER
Care Due:                  Date            Visit Type   Department     Provider  --------------------------------------------------------------------------------                                EP -                              PRIMARY      [facility] FAMILY  Last Visit: 05-      CARE ([facility])   MEDICINE       Ryley Porter                               -                              PRIMARY      [facility] FAMILY  Next Visit: 05-      CARE ([facility])   MEDICINE       Ryley Porter                                                            Last  Test          Frequency    Reason                     Performed    Due Date  --------------------------------------------------------------------------------    CMP.........  12 months..  atorvastatin.............  05- 05-    HBA1C.......  6 months...  glimepiride, metFORMIN...  11- 05-    Lipid Panel.  12 months..  atorvastatin.............  05- 05-    [facility] Embedded Care Due Messages. Reference number: [id number].   4/12/2024 12:45:05 PM CDT

## 2024-04-13 RX ORDER — GLIMEPIRIDE 1 MG/1
1 TABLET ORAL
Qty: 90 TABLET | Refills: 0 | Status: SHIPPED | OUTPATIENT
Start: 2024-04-13 | End: 2024-06-18

## 2024-04-13 NOTE — TELEPHONE ENCOUNTER
Provider Staff:  Action required for this patient    Requires labs      Please see care gap opportunities below in Care Due Message.    Thanks!  [facility]     Appointments      Date Provider   Last Visit   5/29/2023 Ryley Porter MD   Next Visit   5/30/2024 Ryley Porter MD     Refill Decision Note   Odilon Wilder  is requesting a refill authorization.  Brief Assessment and Rationale for Refill:  Approve     Medication Therapy Plan:        Comments:     Note composed:6:29 PM 04/13/2024

## 2024-05-24 ENCOUNTER — LAB VISIT (OUTPATIENT)
Dept: LAB | Facility: HOSPITAL | Age: 74
End: 2024-05-24
Attending: FAMILY MEDICINE
Payer: MEDICARE

## 2024-05-24 DIAGNOSIS — E11.59 HYPERTENSION ASSOCIATED WITH TYPE 2 DIABETES MELLITUS: ICD-10-CM

## 2024-05-24 DIAGNOSIS — E11.69 TYPE 2 DIABETES MELLITUS WITH HYPERLIPIDEMIA: ICD-10-CM

## 2024-05-24 DIAGNOSIS — N18.31 STAGE 3A CHRONIC KIDNEY DISEASE: ICD-10-CM

## 2024-05-24 DIAGNOSIS — I15.2 HYPERTENSION ASSOCIATED WITH TYPE 2 DIABETES MELLITUS: ICD-10-CM

## 2024-05-24 DIAGNOSIS — E78.5 TYPE 2 DIABETES MELLITUS WITH HYPERLIPIDEMIA: ICD-10-CM

## 2024-05-24 LAB
ALBUMIN SERPL BCP-MCNC: 4.4 G/DL (ref 3.5–5.2)
ALP SERPL-CCNC: 61 U/L (ref 38–126)
ALT SERPL W/O P-5'-P-CCNC: 21 U/L (ref 10–44)
ANION GAP SERPL CALC-SCNC: 9 MMOL/L (ref 8–16)
AST SERPL-CCNC: 22 U/L (ref 15–46)
BASOPHILS # BLD AUTO: 0.02 K/UL (ref 0–0.2)
BASOPHILS NFR BLD: 0.4 % (ref 0–1.9)
BILIRUB SERPL-MCNC: 0.7 MG/DL (ref 0.1–1)
CALCIUM SERPL-MCNC: 10.5 MG/DL (ref 8.7–10.5)
CHLORIDE SERPL-SCNC: 101 MMOL/L (ref 95–110)
CHOLEST SERPL-MCNC: 119 MG/DL (ref 120–199)
CHOLEST/HDLC SERPL: 3.4 {RATIO} (ref 2–5)
CO2 SERPL-SCNC: 29 MMOL/L (ref 23–29)
CREAT SERPL-MCNC: 1.23 MG/DL (ref 0.5–1.4)
DIFFERENTIAL METHOD BLD: ABNORMAL
EOSINOPHIL # BLD AUTO: 0.3 K/UL (ref 0–0.5)
EOSINOPHIL NFR BLD: 5.3 % (ref 0–8)
ERYTHROCYTE [DISTWIDTH] IN BLOOD BY AUTOMATED COUNT: 11.3 % (ref 11.5–14.5)
EST. GFR  (NO RACE VARIABLE): >60 ML/MIN/1.73 M^2
ESTIMATED AVG GLUCOSE: 88 MG/DL (ref 68–131)
GLUCOSE SERPL-MCNC: 110 MG/DL (ref 70–110)
HBA1C MFR BLD: 4.7 % (ref 4–5.6)
HCT VFR BLD AUTO: 42.9 % (ref 40–54)
HDLC SERPL-MCNC: 35 MG/DL (ref 40–75)
HDLC SERPL: 29.4 % (ref 20–50)
HGB BLD-MCNC: 13.9 G/DL (ref 14–18)
IMM GRANULOCYTES # BLD AUTO: 0.02 K/UL (ref 0–0.04)
IMM GRANULOCYTES NFR BLD AUTO: 0.4 % (ref 0–0.5)
LDLC SERPL CALC-MCNC: 60 MG/DL (ref 63–159)
LYMPHOCYTES # BLD AUTO: 1 K/UL (ref 1–4.8)
LYMPHOCYTES NFR BLD: 19.8 % (ref 18–48)
MCH RBC QN AUTO: 31.6 PG (ref 27–31)
MCHC RBC AUTO-ENTMCNC: 32.4 G/DL (ref 32–36)
MCV RBC AUTO: 98 FL (ref 82–98)
MONOCYTES # BLD AUTO: 0.4 K/UL (ref 0.3–1)
MONOCYTES NFR BLD: 7.3 % (ref 4–15)
NEUTROPHILS # BLD AUTO: 3.3 K/UL (ref 1.8–7.7)
NEUTROPHILS NFR BLD: 66.8 % (ref 38–73)
NONHDLC SERPL-MCNC: 84 MG/DL
NRBC BLD-RTO: 0 /100 WBC
PLATELET # BLD AUTO: 219 K/UL (ref 150–450)
PMV BLD AUTO: 9.8 FL (ref 9.2–12.9)
POTASSIUM SERPL-SCNC: 4.7 MMOL/L (ref 3.5–5.1)
PROT SERPL-MCNC: 7 G/DL (ref 6–8.4)
RBC # BLD AUTO: 4.4 M/UL (ref 4.6–6.2)
SODIUM SERPL-SCNC: 139 MMOL/L (ref 136–145)
TRIGL SERPL-MCNC: 120 MG/DL (ref 30–150)
TSH SERPL DL<=0.005 MIU/L-ACNC: 1.32 UIU/ML (ref 0.4–4)
UUN UR-MCNC: 29 MG/DL (ref 2–20)
WBC # BLD AUTO: 4.94 K/UL (ref 3.9–12.7)

## 2024-05-24 PROCEDURE — 80061 LIPID PANEL: CPT | Performed by: FAMILY MEDICINE

## 2024-05-24 PROCEDURE — 85025 COMPLETE CBC W/AUTO DIFF WBC: CPT | Mod: PN | Performed by: FAMILY MEDICINE

## 2024-05-24 PROCEDURE — 84443 ASSAY THYROID STIM HORMONE: CPT | Mod: PN | Performed by: FAMILY MEDICINE

## 2024-05-24 PROCEDURE — 83036 HEMOGLOBIN GLYCOSYLATED A1C: CPT | Performed by: FAMILY MEDICINE

## 2024-05-24 PROCEDURE — 80053 COMPREHEN METABOLIC PANEL: CPT | Mod: PN | Performed by: FAMILY MEDICINE

## 2024-05-24 PROCEDURE — 36415 COLL VENOUS BLD VENIPUNCTURE: CPT | Mod: PN | Performed by: FAMILY MEDICINE

## 2024-05-27 ENCOUNTER — TELEPHONE (OUTPATIENT)
Dept: FAMILY MEDICINE | Facility: CLINIC | Age: 74
End: 2024-05-27
Payer: MEDICARE

## 2024-05-27 NOTE — TELEPHONE ENCOUNTER
----- Message from Ryley Porter MD sent at 5/24/2024  9:52 PM CDT -----  Letter to you has been generated

## 2024-05-30 ENCOUNTER — OFFICE VISIT (OUTPATIENT)
Dept: FAMILY MEDICINE | Facility: CLINIC | Age: 74
End: 2024-05-30
Payer: MEDICARE

## 2024-05-30 VITALS
DIASTOLIC BLOOD PRESSURE: 64 MMHG | SYSTOLIC BLOOD PRESSURE: 120 MMHG | WEIGHT: 179.38 LBS | TEMPERATURE: 98 F | HEIGHT: 70 IN | BODY MASS INDEX: 25.68 KG/M2 | OXYGEN SATURATION: 94 % | HEART RATE: 90 BPM

## 2024-05-30 DIAGNOSIS — Z12.12 ENCOUNTER FOR COLORECTAL CANCER SCREENING: ICD-10-CM

## 2024-05-30 DIAGNOSIS — R33.9 URINARY RETENTION: ICD-10-CM

## 2024-05-30 DIAGNOSIS — C61 PROSTATE CANCER: ICD-10-CM

## 2024-05-30 DIAGNOSIS — E78.5 TYPE 2 DIABETES MELLITUS WITH HYPERLIPIDEMIA: ICD-10-CM

## 2024-05-30 DIAGNOSIS — Z46.6 ENCOUNTER FOR FOLEY CATHETER REMOVAL: ICD-10-CM

## 2024-05-30 DIAGNOSIS — Z00.00 ROUTINE HEALTH MAINTENANCE: Primary | ICD-10-CM

## 2024-05-30 DIAGNOSIS — E11.59 HYPERTENSION ASSOCIATED WITH TYPE 2 DIABETES MELLITUS: ICD-10-CM

## 2024-05-30 DIAGNOSIS — Z12.11 ENCOUNTER FOR COLORECTAL CANCER SCREENING: ICD-10-CM

## 2024-05-30 DIAGNOSIS — N18.31 STAGE 3A CHRONIC KIDNEY DISEASE: ICD-10-CM

## 2024-05-30 DIAGNOSIS — E11.3313 TYPE 2 DIABETES MELLITUS WITH BOTH EYES AFFECTED BY MODERATE NONPROLIFERATIVE RETINOPATHY AND MACULAR EDEMA, WITHOUT LONG-TERM CURRENT USE OF INSULIN: ICD-10-CM

## 2024-05-30 DIAGNOSIS — E11.69 TYPE 2 DIABETES MELLITUS WITH HYPERLIPIDEMIA: ICD-10-CM

## 2024-05-30 DIAGNOSIS — I15.2 HYPERTENSION ASSOCIATED WITH TYPE 2 DIABETES MELLITUS: ICD-10-CM

## 2024-05-30 DIAGNOSIS — N40.1 BPH WITH URINARY OBSTRUCTION: ICD-10-CM

## 2024-05-30 DIAGNOSIS — N13.8 BPH WITH URINARY OBSTRUCTION: ICD-10-CM

## 2024-05-30 PROCEDURE — 3288F FALL RISK ASSESSMENT DOCD: CPT | Mod: CPTII,S$GLB,, | Performed by: FAMILY MEDICINE

## 2024-05-30 PROCEDURE — 3078F DIAST BP <80 MM HG: CPT | Mod: CPTII,S$GLB,, | Performed by: FAMILY MEDICINE

## 2024-05-30 PROCEDURE — 3074F SYST BP LT 130 MM HG: CPT | Mod: CPTII,S$GLB,, | Performed by: FAMILY MEDICINE

## 2024-05-30 PROCEDURE — 1101F PT FALLS ASSESS-DOCD LE1/YR: CPT | Mod: CPTII,S$GLB,, | Performed by: FAMILY MEDICINE

## 2024-05-30 PROCEDURE — 3044F HG A1C LEVEL LT 7.0%: CPT | Mod: CPTII,S$GLB,, | Performed by: FAMILY MEDICINE

## 2024-05-30 PROCEDURE — 1126F AMNT PAIN NOTED NONE PRSNT: CPT | Mod: CPTII,S$GLB,, | Performed by: FAMILY MEDICINE

## 2024-05-30 PROCEDURE — 99397 PER PM REEVAL EST PAT 65+ YR: CPT | Mod: S$GLB,,, | Performed by: FAMILY MEDICINE

## 2024-05-30 PROCEDURE — 4010F ACE/ARB THERAPY RXD/TAKEN: CPT | Mod: CPTII,S$GLB,, | Performed by: FAMILY MEDICINE

## 2024-05-30 RX ORDER — TAMSULOSIN HYDROCHLORIDE 0.4 MG/1
CAPSULE ORAL
Qty: 90 CAPSULE | Refills: 3 | Status: SHIPPED | OUTPATIENT
Start: 2024-05-30

## 2024-05-30 RX ORDER — IPRATROPIUM BROMIDE 21 UG/1
2 SPRAY, METERED NASAL 3 TIMES DAILY
Qty: 30 ML | Refills: 0 | Status: SHIPPED | OUTPATIENT
Start: 2024-05-30

## 2024-05-30 RX ORDER — MONTELUKAST SODIUM 10 MG/1
10 TABLET ORAL NIGHTLY
Qty: 90 TABLET | Refills: 3 | Status: SHIPPED | OUTPATIENT
Start: 2024-05-30

## 2024-06-07 LAB — NONINV COLON CA DNA+OCC BLD SCRN STL QL: NORMAL

## 2024-06-09 PROBLEM — E11.3313 TYPE 2 DIABETES MELLITUS WITH BOTH EYES AFFECTED BY MODERATE NONPROLIFERATIVE RETINOPATHY AND MACULAR EDEMA, WITHOUT LONG-TERM CURRENT USE OF INSULIN: Status: ACTIVE | Noted: 2022-07-01

## 2024-06-09 PROBLEM — N18.31 STAGE 3A CHRONIC KIDNEY DISEASE: Status: ACTIVE | Noted: 2024-06-09

## 2024-06-09 NOTE — PROGRESS NOTES
" Patient ID: Odilon Wilder is a 74 y.o. male.    Chief Complaint: Annual Exam    HPI     Odilon Wilder is a 74 y.o. male. here for annual exam.  Followed for BPH which is stable, hypertension which is stable allergies which is stable and diabetes mellitus.  No new problems.    Review of Symptoms    Constitutional: Negative.    HENT: Negative.    Eyes: Negative.    Respiratory: Negative.    Cardiovascular: Negative.    Gastrointestinal: Negative.    Endocrine: Negative.    Genitourinary: Negative.    Musculoskeletal: Negative.    Skin: Negative.    Allergic/Immunologic: Negative.    Neurological: Negative.    Hematological: Negative.    Psychiatric/Behavioral: Negative.      Except as above in HPI    Current Outpatient Medications on File Prior to Visit   Medication Sig Dispense Refill    atorvastatin (LIPITOR) 10 MG tablet TAKE 1 TABLET BY MOUTH EVERY OTHER DAY 45 tablet 3    fluticasone propionate (FLONASE) 50 mcg/actuation nasal spray 1 spray (50 mcg total) by Each Nare route once daily. 1 Bottle 2    glimepiride (AMARYL) 1 MG tablet TAKE 1 TABLET BY MOUTH BEFORE BREAKFAST 90 tablet 0    lisinopriL (PRINIVIL,ZESTRIL) 2.5 MG tablet TAKE 1 TABLET BY MOUTH ONCE DAILY FOR  PROTECTION  OF  KIDNEY 90 tablet 2     No current facility-administered medications on file prior to visit.         Physical  Exam    Vitals:    05/30/24 0805   BP: 120/64   BP Location: Left arm   Patient Position: Sitting   Pulse: 90   Temp: 97.8 °F (36.6 °C)   SpO2: (!) 94%   Weight: 81.4 kg (179 lb 5.5 oz)   Height: 5' 10" (1.778 m)          Constitutional:  Oriented to person, place, and time. Appears well-developed and well-nourished.     HENT:   Head: Normocephalic and atraumatic.     Right Ear: External ear normal     Left Ear: External ear normal      Nose: Nose normal. No rhinorrhea or nasal deformity.     Mouth/Throat: Moist mucus membranes      Eyes: Conjunctivae are normal. Right eye exhibits no discharge. Left eye exhibits " no  discharge. No scleral icterus.     Neck:  No JVD present. No tracheal deviation  []  Neck supple.   Carotid Arteries  []  No Bruit    Cardiovascular:  Regular rate and rhythm with normal S1 and S2     Pulmonary/Chest:   Clear to auscultation bilaterally without wheezes, rhonchi or rales    Abdominal: Soft. No distension and no mass.  No tenderness. No rebound and No guarding.     Musculoskeletal: Normal range of motion. No edema or tenderness.   No deformity     Lymphadenopathy:   []  No cervical adenopathy.  []  No inguinal adenopathy    Neurological:  Alert and oriented to person, place, and time. Coordination normal.     Skin: Skin is warm and dry. No rash noted. No bruising     Psychiatric: Normal mood and affect. Speech is normal and behavior is normal. Judgment and thought content normal.       Assessment / Plan:      ICD-10-CM ICD-9-CM   1. Routine health maintenance  Z00.00 V70.0   2. BPH with urinary obstruction  N40.1 600.01    N13.8 599.69   3. Urinary retention  R33.9 788.20   4. Encounter for Aviles catheter removal  Z46.6 V53.6   5. Encounter for colorectal cancer screening  Z12.11 V76.51    Z12.12 V76.41   6. Type 2 diabetes mellitus with hyperlipidemia  E11.69 250.80    E78.5 272.4   7. Hypertension associated with type 2 diabetes mellitus  E11.59 250.80    I15.2 401.9   8. Stage 3a chronic kidney disease  N18.31 585.3   9. Prostate cancer  C61 185   10. Type 2 diabetes mellitus with both eyes affected by moderate nonproliferative retinopathy and macular edema, without long-term current use of insulin  E11.3313 250.50     362.05     362.07     Routine health maintenance    BPH with urinary obstruction  -     tamsulosin (FLOMAX) 0.4 mg Cap; TAKE 1 CAPSULE BY MOUTH ONCE DAILY. DO NOT CRUSH OR CHEW. SWALLOW WHOLE  Dispense: 90 capsule; Refill: 3    Urinary retention  -     tamsulosin (FLOMAX) 0.4 mg Cap; TAKE 1 CAPSULE BY MOUTH ONCE DAILY. DO NOT CRUSH OR CHEW. SWALLOW WHOLE  Dispense: 90 capsule;  Refill: 3    Encounter for Aviles catheter removal  -     tamsulosin (FLOMAX) 0.4 mg Cap; TAKE 1 CAPSULE BY MOUTH ONCE DAILY. DO NOT CRUSH OR CHEW. SWALLOW WHOLE  Dispense: 90 capsule; Refill: 3    Encounter for colorectal cancer screening  -     Cologuard Screening (Multitarget Stool DNA); Future; Expected date: 05/30/2024    Type 2 diabetes mellitus with hyperlipidemia  -     Hemoglobin A1C; Future; Expected date: 05/30/2024  -     Lipid Panel; Future; Expected date: 05/30/2024  -     TSH; Future; Expected date: 05/30/2024  -     Comprehensive Metabolic Panel; Future; Expected date: 05/30/2024  -     Microalbumin/Creatinine Ratio, Urine; Future; Expected date: 05/30/2024  -     Hemoglobin A1C; Future; Expected date: 05/30/2024  -     CBC Auto Differential; Future; Expected date: 05/30/2024    Hypertension associated with type 2 diabetes mellitus  -     Hemoglobin A1C; Future; Expected date: 05/30/2024  -     Lipid Panel; Future; Expected date: 05/30/2024  -     TSH; Future; Expected date: 05/30/2024  -     Comprehensive Metabolic Panel; Future; Expected date: 05/30/2024  -     Microalbumin/Creatinine Ratio, Urine; Future; Expected date: 05/30/2024  -     Hemoglobin A1C; Future; Expected date: 05/30/2024  -     CBC Auto Differential; Future; Expected date: 05/30/2024    Stage 3a chronic kidney disease    Prostate cancer    Type 2 diabetes mellitus with both eyes affected by moderate nonproliferative retinopathy and macular edema, without long-term current use of insulin    Other orders  -     montelukast (SINGULAIR) 10 mg tablet; Take 1 tablet (10 mg total) by mouth every evening.  Dispense: 90 tablet; Refill: 3  -     ipratropium (ATROVENT) 21 mcg (0.03 %) nasal spray; 2 sprays by Each Nostril route 3 (three) times daily. As needed for allergies.  Dispense: 30 mL; Refill: 0      For allergic rhinitis Atrovent nasal spray and Singulair  Type 2 diabetes-diagnosed years back-currently hemoglobin A1c 4.7  Chronic  kidney disease on lisinopril 2.5 milligrams glucose control  Followed by urology  Discussed how to stay healthy             Ryley Stewart M.D.

## 2024-06-18 RX ORDER — LISINOPRIL 2.5 MG/1
TABLET ORAL
Qty: 90 TABLET | Refills: 3 | Status: SHIPPED | OUTPATIENT
Start: 2024-06-18

## 2024-06-18 RX ORDER — GLIMEPIRIDE 1 MG/1
1 TABLET ORAL
Qty: 90 TABLET | Refills: 1 | Status: SHIPPED | OUTPATIENT
Start: 2024-06-18

## 2024-06-18 RX ORDER — ATORVASTATIN CALCIUM 10 MG/1
TABLET, FILM COATED ORAL
Qty: 45 TABLET | Refills: 3 | Status: SHIPPED | OUTPATIENT
Start: 2024-06-18

## 2024-06-18 NOTE — TELEPHONE ENCOUNTER
No care due was identified.  [facility] Embedded Care Due Messages. Reference number: [id number].   6/18/2024 12:29:07 PM CDT

## 2024-06-19 NOTE — TELEPHONE ENCOUNTER
Refill Decision Note   Odilon Wilder  is requesting a refill authorization.  Brief Assessment and Rationale for Refill:  Approve     Medication Therapy Plan:        Comments:     Note composed:10:10 PM 06/18/2024

## 2024-08-06 LAB
LEFT EYE DM RETINOPATHY: POSITIVE
RIGHT EYE DM RETINOPATHY: POSITIVE

## 2024-08-14 ENCOUNTER — PATIENT OUTREACH (OUTPATIENT)
Dept: ADMINISTRATIVE | Facility: HOSPITAL | Age: 74
End: 2024-08-14
Payer: MEDICARE

## 2024-08-14 NOTE — PROGRESS NOTES
Population Health Chart Review & Patient Outreach Details      Additional [facility] Notes:               Updates Requested / Reviewed:      Updated Care Coordination Note, Care Everywhere, , and Immunizations Reconciliation Completed or Queried: [facility] Topics Overdue:      [facility] Score: 2     Colon Cancer Screening  Foot Exam    Pneumonia Vaccine  Shingles/Zoster Vaccine  RSV Vaccine                  Health Maintenance Topic(s) Outreach Outcomes & Actions Taken:    Eye Exam - Outreach Outcomes & Actions Taken  : Diabetic Eye External Records Uploaded, Care Team & History Updated if Applicable      
97

## 2024-09-19 ENCOUNTER — TELEPHONE (OUTPATIENT)
Dept: FAMILY MEDICINE | Facility: CLINIC | Age: 74
End: 2024-09-19
Payer: MEDICARE

## 2024-09-19 NOTE — TELEPHONE ENCOUNTER
----- Message from Tiana Jayjay sent at 9/19/2024  3:09 PM CDT -----  Regarding: appt  Contact: self/ walked in  The pt states he injured his shoulder.  In Pain.  Tried pain creams but no help/  Would like to see Dr Porter.  Please call him at [phone]

## 2024-09-20 ENCOUNTER — TELEPHONE (OUTPATIENT)
Dept: FAMILY MEDICINE | Facility: CLINIC | Age: 74
End: 2024-09-20

## 2024-09-20 ENCOUNTER — HOSPITAL ENCOUNTER (OUTPATIENT)
Dept: RADIOLOGY | Facility: HOSPITAL | Age: 74
Discharge: HOME | End: 2024-09-20
Attending: PHYSICIAN ASSISTANT
Payer: MEDICARE

## 2024-09-20 ENCOUNTER — OFFICE VISIT (OUTPATIENT)
Dept: FAMILY MEDICINE | Facility: CLINIC | Age: 74
End: 2024-09-20
Payer: MEDICARE

## 2024-09-20 VITALS
BODY MASS INDEX: 25.96 KG/M2 | SYSTOLIC BLOOD PRESSURE: 128 MMHG | OXYGEN SATURATION: 92 % | WEIGHT: 181.31 LBS | TEMPERATURE: 98 F | DIASTOLIC BLOOD PRESSURE: 68 MMHG | HEART RATE: 102 BPM | HEIGHT: 70 IN

## 2024-09-20 DIAGNOSIS — I10 ESSENTIAL HYPERTENSION: ICD-10-CM

## 2024-09-20 DIAGNOSIS — M25.511 ACUTE PAIN OF RIGHT SHOULDER: Primary | ICD-10-CM

## 2024-09-20 DIAGNOSIS — Z12.11 SCREENING FOR COLON CANCER: ICD-10-CM

## 2024-09-20 DIAGNOSIS — E11.69 TYPE 2 DIABETES MELLITUS WITH HYPERLIPIDEMIA: ICD-10-CM

## 2024-09-20 DIAGNOSIS — E78.5 TYPE 2 DIABETES MELLITUS WITH HYPERLIPIDEMIA: ICD-10-CM

## 2024-09-20 DIAGNOSIS — M25.511 ACUTE PAIN OF RIGHT SHOULDER: ICD-10-CM

## 2024-09-20 PROCEDURE — 3044F HG A1C LEVEL LT 7.0%: CPT | Mod: CPTII,S$GLB,, | Performed by: PHYSICIAN ASSISTANT

## 2024-09-20 PROCEDURE — 3288F FALL RISK ASSESSMENT DOCD: CPT | Mod: CPTII,S$GLB,, | Performed by: PHYSICIAN ASSISTANT

## 2024-09-20 PROCEDURE — 3078F DIAST BP <80 MM HG: CPT | Mod: CPTII,S$GLB,, | Performed by: PHYSICIAN ASSISTANT

## 2024-09-20 PROCEDURE — 1159F MED LIST DOCD IN RCRD: CPT | Mod: CPTII,S$GLB,, | Performed by: PHYSICIAN ASSISTANT

## 2024-09-20 PROCEDURE — 73030 X-RAY EXAM OF SHOULDER: CPT | Mod: 26,RT,, | Performed by: RADIOLOGY

## 2024-09-20 PROCEDURE — 3008F BODY MASS INDEX DOCD: CPT | Mod: CPTII,S$GLB,, | Performed by: PHYSICIAN ASSISTANT

## 2024-09-20 PROCEDURE — 99214 OFFICE O/P EST MOD 30 MIN: CPT | Mod: S$GLB,,, | Performed by: PHYSICIAN ASSISTANT

## 2024-09-20 PROCEDURE — 3074F SYST BP LT 130 MM HG: CPT | Mod: CPTII,S$GLB,, | Performed by: PHYSICIAN ASSISTANT

## 2024-09-20 PROCEDURE — 73030 X-RAY EXAM OF SHOULDER: CPT | Mod: TC,FY,PN,RT

## 2024-09-20 PROCEDURE — 1101F PT FALLS ASSESS-DOCD LE1/YR: CPT | Mod: CPTII,S$GLB,, | Performed by: PHYSICIAN ASSISTANT

## 2024-09-20 PROCEDURE — 4010F ACE/ARB THERAPY RXD/TAKEN: CPT | Mod: CPTII,S$GLB,, | Performed by: PHYSICIAN ASSISTANT

## 2024-09-20 PROCEDURE — 1160F RVW MEDS BY RX/DR IN RCRD: CPT | Mod: CPTII,S$GLB,, | Performed by: PHYSICIAN ASSISTANT

## 2024-09-20 NOTE — PROGRESS NOTES
"   Patient ID: Odilon Wilder is a 74 y.o. male.     Chief Complaint: Shoulder Pain    74 year old male with history of HTN and DM2 presents to clinic with complaints of right shoulder pain for 1 month. States he lifted on something heavy and heard a "snap" with pain to the right shoulder. Reports certain movements hurt the shoulder. States he cannot sleep at night due to the pain. Denies dropping things, numbness/tingling. Has tried OTC ibuprofen, with some relief.        Review of Systems  Review of Systems   Constitutional:  Negative for fever.   HENT:  Negative for ear pain and sinus pain.    Eyes:  Negative for discharge.   Respiratory:  Negative for cough and wheezing.    Cardiovascular:  Negative for chest pain and leg swelling.   Gastrointestinal:  Negative for diarrhea, nausea and vomiting.   Genitourinary:  Negative for urgency.   Musculoskeletal:  Positive for joint pain. Negative for myalgias.   Skin:  Negative for rash.   Neurological:  Negative for weakness and headaches.   Psychiatric/Behavioral:  Negative for depression.        Currently Medications  Current Outpatient Medications on File Prior to Visit   Medication Sig Dispense Refill    atorvastatin (LIPITOR) 10 MG tablet TAKE 1 TABLET BY MOUTH EVERY OTHER DAY 45 tablet 3    fluticasone propionate (FLONASE) 50 mcg/actuation nasal spray 1 spray (50 mcg total) by Each Nare route once daily. 1 Bottle 2    glimepiride (AMARYL) 1 MG tablet TAKE 1 TABLET BY MOUTH BEFORE BREAKFAST 90 tablet 1    ipratropium (ATROVENT) 21 mcg (0.03 %) nasal spray 2 sprays by Each Nostril route 3 (three) times daily. As needed for allergies. 30 mL 0    lisinopriL (PRINIVIL,ZESTRIL) 2.5 MG tablet TAKE 1 TABLET BY MOUTH ONCE DAILY FOR  PROTECTION  OF  KIDNEY 90 tablet 3    montelukast (SINGULAIR) 10 mg tablet Take 1 tablet (10 mg total) by mouth every evening. 90 tablet 3    tamsulosin (FLOMAX) 0.4 mg Cap TAKE 1 CAPSULE BY MOUTH ONCE DAILY. DO NOT CRUSH OR CHEW. SWALLOW " "WHOLE 90 capsule 3     No current facility-administered medications on file prior to visit.       Physical  Exam  Vitals:    09/20/24 1018   BP: 128/68   BP Location: Right arm   Patient Position: Sitting   Pulse: 102   Temp: 98 °F (36.7 °C)   SpO2: (!) 92%   Weight: 82.2 kg (181 lb 5.3 oz)   Height: 5' 10" (1.778 m)      Body mass index is 26.02 kg/m².  Wt Readings from Last 3 Encounters:   09/20/24 82.2 kg (181 lb 5.3 oz)   05/30/24 81.4 kg (179 lb 5.5 oz)   05/29/23 77.6 kg (170 lb 15.5 oz)       Physical Exam  Vitals and nursing note reviewed.   Constitutional:       General: He is not in acute distress.     Appearance: He is not ill-appearing.   HENT:      Head: Normocephalic and atraumatic.      Right Ear: External ear normal.      Left Ear: External ear normal.      Nose: Nose normal.      Mouth/Throat:      Mouth: Mucous membranes are moist.   Eyes:      Extraocular Movements: Extraocular movements intact.      Conjunctiva/sclera: Conjunctivae normal.   Cardiovascular:      Rate and Rhythm: Normal rate and regular rhythm.      Pulses: Normal pulses.      Heart sounds: No murmur heard.  Pulmonary:      Effort: Pulmonary effort is normal. No respiratory distress.      Breath sounds: No wheezing.   Abdominal:      General: There is no distension.      Palpations: Abdomen is soft. There is no mass.      Tenderness: There is no abdominal tenderness.   Musculoskeletal:         General: No swelling.      Right shoulder: Tenderness present. Decreased strength.      Cervical back: Normal range of motion.      Comments: Varney's test positive right shoulder more medially; pain with Yergason; drop arm, empty can negative   Skin:     Coloration: Skin is not jaundiced.      Findings: No rash.   Neurological:      General: No focal deficit present.      Mental Status: He is alert and oriented to person, place, and time.   Psychiatric:         Mood and Affect: Mood normal.         Thought Content: Thought content " normal.         Labs:    Complete Blood Count  Lab Results   Component Value Date    RBC 4.40 (L) 05/24/2024    HGB 13.9 (L) 05/24/2024    HCT 42.9 05/24/2024    MCV 98 05/24/2024    MCH 31.6 (H) 05/24/2024    MCHC 32.4 05/24/2024    RDW 11.3 (L) 05/24/2024     05/24/2024    MPV 9.8 05/24/2024    GRAN 3.3 05/24/2024    GRAN 66.8 05/24/2024    LYMPH 1.0 05/24/2024    LYMPH 19.8 05/24/2024    MONO 0.4 05/24/2024    MONO 7.3 05/24/2024    EOS 0.3 05/24/2024    BASO 0.02 05/24/2024    EOSINOPHIL 5.3 05/24/2024    BASOPHIL 0.4 05/24/2024    DIFFMETHOD Automated 05/24/2024       Comprehensive Metabolic Panel  Lab Results   Component Value Date     05/24/2024    BUN 29 (H) 05/24/2024    CREATININE 1.23 05/24/2024     05/24/2024    K 4.7 05/24/2024     05/24/2024    PROT 7.0 05/24/2024    ALBUMIN 4.4 05/24/2024    BILITOT 0.7 05/24/2024    AST 22 05/24/2024    ALKPHOS 61 05/24/2024    CO2 29 05/24/2024    ALT 21 05/24/2024    ANIONGAP 9 05/24/2024       TSH  Lab Results   Component Value Date    TSH 1.320 05/24/2024       Imaging:  X-Ray Shoulder Trauma 3 view Right  Narrative: EXAMINATION:  XR SHOULDER TRAUMA 3 VIEW RIGHT    CLINICAL HISTORY:  Pain in right shoulder    TECHNIQUE:  Three views right shoulder.    COMPARISON:  None    FINDINGS:  Negative for acute fracture or dislocation.    No significant arthritic changes.    Minimal arthritic change acromioclavicular joint.    No significant soft tissue findings.  Impression: No acute osseous findings.    Electronically signed by: Anirudh Lopez MD  Date:    09/20/2024  Time:    11:50      Assessment/Plan:    1. Acute pain of right shoulder  Comments:  - Continue ibuprofen  - X-ray and ultrasound  - Follow pending imaging  Orders:  -     X-Ray Shoulder Trauma 3 view Right; Future; Expected date: 09/20/2024  -     US Soft Tissue Upper Extremity, Right; Future; Expected date: 09/20/2024    2. Type 2 diabetes mellitus with hyperlipidemia  Comments:  -  Chronic, stable  - Continue amaryl  - Follow with PCP  Orders:  -     Ambulatory referral/consult to Podiatry; Future; Expected date: 09/20/2025    3. Essential hypertension  Comments:  - Chronic, stable  - Continue lisinopril  - Follow with PCP    4. Screening for colon cancer  -     Ambulatory referral/consult to Endo Procedure ; Future; Expected date: 10/20/2024         Discussed how to stay healthy including: diet, exercise, refraining from smoking and discussed screening exams / tests needed for age, sex and family Hx.    RTC pending imaging    Angie Chavez PA-C

## 2024-09-20 NOTE — TELEPHONE ENCOUNTER
Erica  Can you please reach out to the pt to schedule  US Soft Tissue Upper Extremity, Right   And main [facility]  for suspected short head biceps tendon tear, right   .    I advised the pt he will hear from someone today

## 2024-09-20 NOTE — TELEPHONE ENCOUNTER
Tyson states a radiologist has to do it at main campus. We will have to reschedule or just order another test

## 2024-09-25 DIAGNOSIS — M25.511 ACUTE PAIN OF RIGHT SHOULDER: Primary | ICD-10-CM

## 2024-09-25 DIAGNOSIS — S46.119A TRAUMATIC PARTIAL TEAR OF BICEPS TENDON, INITIAL ENCOUNTER: ICD-10-CM

## 2024-10-01 ENCOUNTER — HOSPITAL ENCOUNTER (OUTPATIENT)
Dept: RADIOLOGY | Facility: HOSPITAL | Age: 74
Discharge: HOME | End: 2024-10-01
Attending: PHYSICIAN ASSISTANT
Payer: MEDICARE

## 2024-10-01 DIAGNOSIS — S46.119A TRAUMATIC PARTIAL TEAR OF BICEPS TENDON, INITIAL ENCOUNTER: ICD-10-CM

## 2024-10-01 DIAGNOSIS — M25.511 ACUTE PAIN OF RIGHT SHOULDER: ICD-10-CM

## 2024-10-01 LAB
LEFT EYE DM RETINOPATHY: NEGATIVE
RIGHT EYE DM RETINOPATHY: NEGATIVE

## 2024-10-07 ENCOUNTER — TELEPHONE (OUTPATIENT)
Dept: FAMILY MEDICINE | Facility: CLINIC | Age: 74
End: 2024-10-07
Payer: MEDICARE

## 2024-10-07 NOTE — TELEPHONE ENCOUNTER
The patient is not able to do MRI shoulder due to laying down for 45 minutes    Do you want to do a stand up open MRI   Or send to ortho    Please advise

## 2024-10-07 NOTE — TELEPHONE ENCOUNTER
[facility] Staff  Caller: Unspecified (Today,  9:10 AM)  Type:  Patient Returning Call    Who Called:pt  Who Left Message for Patient:Myla Mckeon LPN  Does the patient know what this is regarding?:N/a  Would the patient rather a call back or a response via [employer]? Call  Best Call Back Number:[phone]  Additional Information:

## 2024-10-07 NOTE — TELEPHONE ENCOUNTER
----- Message from Tiana sent at 10/7/2024  8:55 AM CDT -----  Regarding: problem with MRI  Contact: self/ walked in  The pt states because of the shoulder pain he cannot lay 45 minutes to get that MRI done here in [place]. Is there any other way? Please call him at [phone]

## 2024-10-09 ENCOUNTER — PATIENT OUTREACH (OUTPATIENT)
Dept: ADMINISTRATIVE | Facility: HOSPITAL | Age: 74
End: 2024-10-09
Payer: MEDICARE

## 2024-10-09 NOTE — PROGRESS NOTES
Health Maintenance Topic(s) Outreach Outcomes & Actions Taken:    Eye Exam - Outreach Outcomes & Actions Taken  : Diabetic Eye External Records Uploaded, Care Team & History Updated if Applicable

## 2024-10-21 ENCOUNTER — TELEPHONE (OUTPATIENT)
Dept: ENDOSCOPY | Facility: HOSPITAL | Age: 74
End: 2024-10-21

## 2024-10-21 ENCOUNTER — CLINICAL SUPPORT (OUTPATIENT)
Dept: ENDOSCOPY | Facility: HOSPITAL | Age: 74
End: 2024-10-21
Payer: MEDICARE

## 2024-10-21 DIAGNOSIS — Z12.11 SCREENING FOR COLON CANCER: ICD-10-CM

## 2024-10-21 NOTE — PLAN OF CARE
Attempted to contact patient for PAT appointment to schedule colonoscopy. Left voicemail message to call Endoscopy Scheduling at # [phone].

## 2024-10-21 NOTE — TELEPHONE ENCOUNTER
Attempted to contact patient for PAT appointment to schedule colonoscopy. Left voicemail message to call Endoscopy Scheduling at # [phone]. New PAT appointment scheduled.

## 2024-10-31 ENCOUNTER — OFFICE VISIT (OUTPATIENT)
Dept: ORTHOPEDICS | Facility: CLINIC | Age: 74
End: 2024-10-31
Payer: MEDICARE

## 2024-10-31 VITALS — BODY MASS INDEX: 26.02 KG/M2 | HEIGHT: 70 IN

## 2024-10-31 DIAGNOSIS — M25.519 SHOULDER PAIN, UNSPECIFIED CHRONICITY, UNSPECIFIED LATERALITY: Primary | ICD-10-CM

## 2024-10-31 DIAGNOSIS — M25.511 ACUTE PAIN OF RIGHT SHOULDER: ICD-10-CM

## 2024-10-31 DIAGNOSIS — S46.111A RUPTURE OF RIGHT LONG HEAD BICEPS TENDON, INITIAL ENCOUNTER: ICD-10-CM

## 2024-10-31 PROCEDURE — 3288F FALL RISK ASSESSMENT DOCD: CPT | Mod: CPTII,S$GLB,, | Performed by: ORTHOPAEDIC SURGERY

## 2024-10-31 PROCEDURE — 3044F HG A1C LEVEL LT 7.0%: CPT | Mod: CPTII,S$GLB,, | Performed by: ORTHOPAEDIC SURGERY

## 2024-10-31 PROCEDURE — 99999 PR PBB SHADOW E&M-EST. PATIENT-LVL III: CPT | Mod: PBBFAC,,, | Performed by: ORTHOPAEDIC SURGERY

## 2024-10-31 PROCEDURE — 3008F BODY MASS INDEX DOCD: CPT | Mod: CPTII,S$GLB,, | Performed by: ORTHOPAEDIC SURGERY

## 2024-10-31 PROCEDURE — 1101F PT FALLS ASSESS-DOCD LE1/YR: CPT | Mod: CPTII,S$GLB,, | Performed by: ORTHOPAEDIC SURGERY

## 2024-10-31 PROCEDURE — 99203 OFFICE O/P NEW LOW 30 MIN: CPT | Mod: 25,S$GLB,, | Performed by: ORTHOPAEDIC SURGERY

## 2024-10-31 PROCEDURE — 1125F AMNT PAIN NOTED PAIN PRSNT: CPT | Mod: CPTII,S$GLB,, | Performed by: ORTHOPAEDIC SURGERY

## 2024-10-31 PROCEDURE — 20610 DRAIN/INJ JOINT/BURSA W/O US: CPT | Mod: RT,S$GLB,, | Performed by: ORTHOPAEDIC SURGERY

## 2024-10-31 PROCEDURE — 1159F MED LIST DOCD IN RCRD: CPT | Mod: CPTII,S$GLB,, | Performed by: ORTHOPAEDIC SURGERY

## 2024-10-31 PROCEDURE — 4010F ACE/ARB THERAPY RXD/TAKEN: CPT | Mod: CPTII,S$GLB,, | Performed by: ORTHOPAEDIC SURGERY

## 2024-10-31 RX ORDER — TRIAMCINOLONE ACETONIDE 40 MG/ML
40 INJECTION, SUSPENSION INTRA-ARTICULAR; INTRAMUSCULAR
Status: COMPLETED | OUTPATIENT
Start: 2024-10-31 | End: 2024-10-31

## 2024-10-31 RX ADMIN — TRIAMCINOLONE ACETONIDE 40 MG: 40 INJECTION, SUSPENSION INTRA-ARTICULAR; INTRAMUSCULAR at 01:10

## 2024-11-25 ENCOUNTER — OFFICE VISIT (OUTPATIENT)
Dept: PODIATRY | Facility: CLINIC | Age: 74
End: 2024-11-25
Payer: MEDICARE

## 2024-11-25 VITALS — WEIGHT: 181.19 LBS | BODY MASS INDEX: 25.94 KG/M2 | HEIGHT: 70 IN

## 2024-11-25 DIAGNOSIS — E11.69 TYPE 2 DIABETES MELLITUS WITH HYPERLIPIDEMIA: ICD-10-CM

## 2024-11-25 DIAGNOSIS — L60.9 DISEASE OF NAIL: ICD-10-CM

## 2024-11-25 DIAGNOSIS — E11.9 ENCOUNTER FOR DIABETIC FOOT EXAM: Primary | ICD-10-CM

## 2024-11-25 DIAGNOSIS — E78.5 TYPE 2 DIABETES MELLITUS WITH HYPERLIPIDEMIA: ICD-10-CM

## 2024-11-25 PROCEDURE — 99203 OFFICE O/P NEW LOW 30 MIN: CPT | Mod: S$GLB,,, | Performed by: STUDENT IN AN ORGANIZED HEALTH CARE EDUCATION/TRAINING PROGRAM

## 2024-11-25 PROCEDURE — 3008F BODY MASS INDEX DOCD: CPT | Mod: CPTII,S$GLB,, | Performed by: STUDENT IN AN ORGANIZED HEALTH CARE EDUCATION/TRAINING PROGRAM

## 2024-11-25 PROCEDURE — 3044F HG A1C LEVEL LT 7.0%: CPT | Mod: CPTII,S$GLB,, | Performed by: STUDENT IN AN ORGANIZED HEALTH CARE EDUCATION/TRAINING PROGRAM

## 2024-11-25 PROCEDURE — 99999 PR PBB SHADOW E&M-EST. PATIENT-LVL III: CPT | Mod: PBBFAC,,, | Performed by: STUDENT IN AN ORGANIZED HEALTH CARE EDUCATION/TRAINING PROGRAM

## 2024-11-25 PROCEDURE — 1159F MED LIST DOCD IN RCRD: CPT | Mod: CPTII,S$GLB,, | Performed by: STUDENT IN AN ORGANIZED HEALTH CARE EDUCATION/TRAINING PROGRAM

## 2024-11-25 PROCEDURE — 1126F AMNT PAIN NOTED NONE PRSNT: CPT | Mod: CPTII,S$GLB,, | Performed by: STUDENT IN AN ORGANIZED HEALTH CARE EDUCATION/TRAINING PROGRAM

## 2024-11-25 PROCEDURE — 4010F ACE/ARB THERAPY RXD/TAKEN: CPT | Mod: CPTII,S$GLB,, | Performed by: STUDENT IN AN ORGANIZED HEALTH CARE EDUCATION/TRAINING PROGRAM

## 2024-11-25 NOTE — PROGRESS NOTES
Subjective:     Patient ID: Odilon Wilder is a 74 y.o. male.    Chief Complaint: Diabetic Foot Exam (Diabetic foot exam)    Odilon is a 74 y.o. male who presents to the clinic upon referral from Dr. Chavez  for evaluation and treatment of diabetic feet. Odilon has a past medical history of Elevated PSA, High glucose, and Urinary tract infection. Patient relates no major problem with feet. Only complaints today consist of here for diabetic foot exam.    PCP: Ryley Porter MD    Date Last Seen by PCP:     Current shoe gear:     Hemoglobin A1C   Date Value Ref Range Status   05/24/2024 4.7 4.0 - 5.6 % Final     Comment:     ADA Screening Guidelines:  5.7-6.4%  Consistent with prediabetes  >or=6.5%  Consistent with diabetes    High levels of fetal hemoglobin interfere with the HbA1C  assay. Heterozygous hemoglobin variants (HbS, HgC, etc)do  not significantly interfere with this assay.   However, presence of multiple variants may affect accuracy.     11/28/2023 4.8 4.0 - 5.6 % Final     Comment:     ADA Screening Guidelines:  5.7-6.4%  Consistent with prediabetes  >or=6.5%  Consistent with diabetes    High levels of fetal hemoglobin interfere with the HbA1C  assay. Heterozygous hemoglobin variants (HbS, HgC, etc)do  not significantly interfere with this assay.   However, presence of multiple variants may affect accuracy.     05/26/2023 4.8 4.0 - 5.6 % Final     Comment:     ADA Screening Guidelines:  5.7-6.4%  Consistent with prediabetes  >or=6.5%  Consistent with diabetes    High levels of fetal hemoglobin interfere with the HbA1C  assay. Heterozygous hemoglobin variants (HbS, HgC, etc)do  not significantly interfere with this assay.   However, presence of multiple variants may affect accuracy.           Review of Systems   Constitutional: Negative for chills, decreased appetite, diaphoresis and fever.   HENT:  Negative for congestion and hearing loss.    Cardiovascular:  Negative for chest pain,  claudication, leg swelling and syncope.   Respiratory:  Negative for cough and shortness of breath.    Skin:  Positive for dry skin and nail changes. Negative for color change, flushing, itching, poor wound healing and rash.   Musculoskeletal:  Negative for arthritis, back pain, joint pain and joint swelling.   Gastrointestinal:  Negative for nausea and vomiting.   Neurological:  Positive for paresthesias. Negative for focal weakness and weakness.   Psychiatric/Behavioral:  Negative for altered mental status. The patient is not nervous/anxious.         Objective:     Physical Exam  Constitutional:       General: He is not in acute distress.     Appearance: Normal appearance. He is well-developed. He is not diaphoretic.   Cardiovascular:      Pulses:           Dorsalis pedis pulses are 2+ on the right side and 2+ on the left side.        Posterior tibial pulses are 1+ on the right side and 1+ on the left side.      Comments: Dorsalis pedis and posterior tibial pulses are within normal limits. Skin temperature is within normal limits. Toes are cool to touch and feet are warm proximally. Hair growth is within normal limits. Skin is normotrophic and without hyperpigmentation. No edema noted. No varicosities or spider veins noted, bilaterally.   Musculoskeletal:         General: No tenderness.      Comments: Adequate joint range of motion without pain, limitation, nor crepitation to foot and ankle joints. Muscle strength is 5/5 in all groups bilaterally.       Feet:      Right foot:      Protective Sensation: 10 sites tested.  10 sites sensed.      Skin integrity: Dry skin present. No ulcer, blister, erythema or warmth.      Left foot:      Protective Sensation: 10 sites tested.  10 sites sensed.      Skin integrity: Dry skin present. No ulcer, blister, erythema or warmth.   Skin:     General: Skin is warm and dry.      Capillary Refill: Capillary refill takes less than 2 seconds.      Comments: Skin is warm and dry, no  acute signs of infection noted bilaterally      Toenails are thickened by 2-4 mm's, dystrophic, and are darkened in coloration with subungual fungal debris.     Otherwise, no open wounds, macerations or hyperkeratotic lesions, bilaterally            Neurological:      Mental Status: He is alert and oriented to person, place, and time.      Sensory: Sensory deficit present.      Motor: No abnormal muscle tone.      Comments: Light touch within normal limits.   Psychiatric:         Mood and Affect: Mood normal.         Behavior: Behavior normal.         Thought Content: Thought content normal.           Assessment:      Encounter Diagnoses   Name Primary?    Type 2 diabetes mellitus with hyperlipidemia     Encounter for diabetic foot exam Yes    Disease of nail      Plan:     Odilon was seen today for diabetic foot exam.    Diagnoses and all orders for this visit:    Encounter for diabetic foot exam  -     Foot Exam Performed    Type 2 diabetes mellitus with hyperlipidemia  Comments:  - Chronic, stable  - Continue amaryl  - Follow with PCP  Orders:  -     Ambulatory referral/consult to Podiatry    Disease of nail      I counseled the patient on his conditions, their implications and medical management.  Toenails debrided 1-10, using sterile nail nippers, as a courtesy, without incident. Patient tolerated well  Recommend OTC treatment for thickened toenails  Annual diabetic foot exam performed today. Shoe inspection. Diabetic Foot Education. Patient reminded of the importance of good nutrition and blood sugar control to help prevent podiatric complications of diabetes. Patient instructed on proper foot hygeine. We discussed wearing proper shoe gear, daily foot inspections, never walking without protective shoe gear, never putting sharp instruments to feet.  Return to clinic in 1 year, sooner PRN

## 2024-12-02 ENCOUNTER — LAB VISIT (OUTPATIENT)
Dept: LAB | Facility: HOSPITAL | Age: 74
End: 2024-12-02
Attending: FAMILY MEDICINE
Payer: MEDICARE

## 2024-12-02 DIAGNOSIS — E78.5 TYPE 2 DIABETES MELLITUS WITH HYPERLIPIDEMIA: ICD-10-CM

## 2024-12-02 DIAGNOSIS — I15.2 HYPERTENSION ASSOCIATED WITH TYPE 2 DIABETES MELLITUS: ICD-10-CM

## 2024-12-02 DIAGNOSIS — E11.59 HYPERTENSION ASSOCIATED WITH TYPE 2 DIABETES MELLITUS: ICD-10-CM

## 2024-12-02 DIAGNOSIS — E11.69 TYPE 2 DIABETES MELLITUS WITH HYPERLIPIDEMIA: ICD-10-CM

## 2024-12-02 LAB
ESTIMATED AVG GLUCOSE: 103 MG/DL (ref 68–131)
HBA1C MFR BLD: 5.2 % (ref 4–5.6)

## 2024-12-02 PROCEDURE — 36415 COLL VENOUS BLD VENIPUNCTURE: CPT | Mod: PN | Performed by: FAMILY MEDICINE

## 2024-12-02 PROCEDURE — 83036 HEMOGLOBIN GLYCOSYLATED A1C: CPT | Performed by: FAMILY MEDICINE

## 2025-01-07 LAB
LEFT EYE DM RETINOPATHY: POSITIVE
RIGHT EYE DM RETINOPATHY: POSITIVE

## 2025-01-14 ENCOUNTER — PATIENT OUTREACH (OUTPATIENT)
Dept: ADMINISTRATIVE | Facility: HOSPITAL | Age: 75
End: 2025-01-14
Payer: MEDICARE

## 2025-01-14 NOTE — PROGRESS NOTES
External Record Received. Uploaded and hyper linked DM eye exam into [facility]

## 2025-01-15 ENCOUNTER — CLINICAL SUPPORT (OUTPATIENT)
Dept: ENDOSCOPY | Facility: HOSPITAL | Age: 75
End: 2025-01-15
Payer: MEDICARE

## 2025-01-15 ENCOUNTER — TELEPHONE (OUTPATIENT)
Dept: ENDOSCOPY | Facility: HOSPITAL | Age: 75
End: 2025-01-15

## 2025-01-15 DIAGNOSIS — Z12.11 SCREENING FOR COLON CANCER: ICD-10-CM

## 2025-01-15 RX ORDER — POLYETHYLENE GLYCOL 3350, SODIUM SULFATE ANHYDROUS, SODIUM BICARBONATE, SODIUM CHLORIDE, POTASSIUM CHLORIDE 236; 22.74; 6.74; 5.86; 2.97 G/4L; G/4L; G/4L; G/4L; G/4L
4 POWDER, FOR SOLUTION ORAL ONCE
Qty: 4000 ML | Refills: 0 | Status: SHIPPED | OUTPATIENT
Start: 2025-01-15 | End: 2025-01-15

## 2025-01-15 NOTE — TELEPHONE ENCOUNTER
Referral for procedure from PAT appointment      Spoke to patient to schedule procedure(s) Colonoscopy       Physician to perform procedure(s) Dr. HILLARY Alcazar  Date of Procedure (s) 2/3/25  Arrival Time 11:00 AM  Time of Procedure(s) 12:00 PM   Location of Procedure(s) [facility] [facility]  Type of Rx Prep sent to patient: PEG  Instructions provided to patient via Email    Patient was informed on the following information and verbalized understanding. Screening questionnaire reviewed with patient and complete. If procedure requires anesthesia, a responsible adult needs to be present to accompany the patient home, patient cannot drive after receiving anesthesia. Appointment details are tentative, especially check-in time. Patient will receive a prep-op call 7 days prior to confirm check-in time for procedure. If applicable the patient should contact their pharmacy to verify Rx for procedure prep is ready for pick-up. Patient was advised to call the scheduling department at [phone] if pharmacy states no Rx is available. Patient was advised to call the endoscopy scheduling department if any questions or concerns arise.       Endoscopy Scheduling Department           Colonoscopy Procedure Prep Instructions    Date of procedure: 2/3/25 Arrive at: 11:00 AM    Location of Department:   [facility] [address] [place] [address]   Stop in the hospital lobby on the 1st floor to get registered, then take the hospital elevators to the 2nd floor waiting room    As soon as possible:   your prep from pharmacy and over the counter DULCOLAX LAXATIVE TABLETS            On the day before your procedure   What You CAN do:   You may have clear liquids ONLY-see below for list.     Liquids That Are OK to Drink:   Water  Sports drinks (Gatorade, Power-Aid)  Coffee or tea (no cream or nondairy creamer)  Clear juices without pulp (apple, white grape)  Gelatin desserts (no fruit or toppings)  Clear soda (sprite,  coke, ginger ale)  Chicken broth (until 12 midnight the night before procedure)    What You CANNOT do:   Do not EAT solid food, drink milk or anything   colored red.  Do not drink alcohol.  Do not take oral medications within 1 hour of starting   each dose of prep.  No gum chewing or candy morning of procedure                       Note:   (Please disregard the insert instructions from pharmacy).  PEG Bowel Prep is indicated for cleansing of the colon as a preparation for colonoscopy in adults.   Be sure to tell your doctor about all the medicines you take, including prescription and non-prescription medicines, vitamins, and herbal supplements. PEG Bowel Prep may affect how other medicines work.  Medication taken by mouth may not be absorbed properly when taken within 1 hour before the start of each dose of PEG Bowel Prep.    It is not uncommon to experience some abdominal cramping, nausea and/or vomiting when taking the prep. If you have nausea and/or vomiting while taking the prep, stop drinking for 20 to 30 minutes then continue.      How to take prep:    PEG Bowel Prep is a (2-day) prep.    One (1) bottle of prep are required for a complete preparation for colonoscopy. Dilute the solution concentrate as directed prior to use. You must drink water with each dose of prep, and additional water after each dose.    DOSE 1--Day Before Colonoscopy 2/2/25     Drink at least 6 to 8 glasses of clear liquids from time you wake up until you begin your prep and then continue until bedtime to avoid dehydration.     12:00 pm (NOON) Mix your entire container of prep with lukewarm water and refrigerate. Take four (4) Dulcolax (Bisacodyl) tablets with at least 8 ounces or more of clear liquids.       6:00 pm:    You must complete Steps 1 and 2 below before going to bed:    Step 1-Drink half the liquid in the container within one (1) hour.   Step 2-Refrigerate the remaining half of the liquid for dose 2. See below when to begin  this step.                       IMPORTANT: If you experience preparation-related symptoms (for example, nausea, bloating, or cramping), stop, or slow the rate of drinking the additional water until your symptoms decrease.    DOSE 2--Day of the Colonoscopy 2/3/25 at 2-3 AM.    For this dose, repeat Step 1 shown above using the remaining half of the liquid prep.   You may continue drinking water/clear liquids until   2 hours before your colonoscopy or as directed by the scheduling nurse  10:00 AM.      For information about your procedure, two (2) things to view prior to colonoscopy:  Please watch this informational video. It is important to watch this animated consent video prior to your arrival. If you haven't watched the video prior to arriving, you are required to watch it during admission which can causes delays.    Options for viewing:   Using a keyboard:  press and hold the control tab (Ctrl) and left mouse click to follow links.           Colonoscopy Instructional Video                                                                                   OR    Type link address into your web browser's address bar:  [email or web address]      Educational Booklet with pictures:      Colonoscopy Prep - Liquid           IMPORTANT INFORMATION TO KNOW BEFORE YOUR PROCEDURE    [facility] 2nd Floor         If your procedure requires the administration of anesthesia, it is necessary for a responsible adult to drive you home. (Medical Transportation, Uber, Lyft, Taxi, etc. may ONLY be used if a responsible adult is present to accompany you home.  The responsible adult CAN'T be the  of the service).      person must be available to return to pick you up within 15 minutes of being notified of discharge.       Please bring a picture ID, insurance card, & copayment      Take Medications as directed below:        If you begin taking any blood thinning medications,  injectable weight loss/diabetes medications (other than insulin) , or Adipex (Phentermine) please contact the endoscopy scheduling department listed below as soon as possible.    If you are diabetic see the attached instruction sheet regarding your medication.     If you take HEART, BLOOD PRESSURE, SEIZURE, PAIN, LUNG (including inhalers/nebulizers), ANTI-REJECTION (transplant patients), or PSYCHIATRIC medications, please take at your regular times with a sip of water or as directed by the scheduling nurse.     Important contact information:    Endoscopy [phone] Hours of operation Monday-Friday 8:00-4:30pm.    Questions about insurance or financial obligations call [phone] or [phone].    If you have questions regarding the prep or need to reschedule, please call [phone]. After hours questions requiring immediate assistance, contact [facility] nurse line at [phone] or [phone].   NOTE:     On occasion, unforeseen circumstances may cause a delay in your procedure start time. We respect your time and appreciate your patience during these circumstances.      Comments:        If you are unsure about any of these instructions, call [facility] at [phone].                          Oral Medicine  Day of Prep  Day of Procedure           Glyburide    Do NOT take morning of procedure. If you         Glucotrol (Glipizide)  Do NOT take. take twice daily, take with dinner.         Amaryl (Glimepiride)                                Glucophage    Do NOT take morning of procedure. Take         Glumetza  Take as  when you start eating again.          Fortamet (Metformin)  prescribed.                              Januvia (Sitaglipitin)    Do NOT take morning of procedure. Take         Nesina (Aloglipitin)    when you start eating again.          Onglyza (Saxaglipitin)  Take as              Tradjenta (Linaglipitin)  prescribed.                              Invokana  (Canagliflozin)               Farxiga (Dapagliflozin)  Stop 2 days  Do NOT take morning of procedure. Take         Jardiance(Empagliflozin) before prep.  when you start eating again.                          Actos (Pioglitazone)  Take as  Do NOT take morning of procedure. Take           prescribed.  when you start eating again.                          Injectable & Combination Daily Dose  Weekly Dose           Medicine                Adlyxin (Lixisenatide)  If you take these For weekly dose, hold dose at least 8 days prior        Byetta (Exenatide)  medications daily to appointment. You may take the dose after your        Bydureon (Exenatide XL) on the day of your procedure.            Ozempic (Semaglutide)  appointment hold              Trulicity (Dulaglutide)  that dose until              Victoza (Liraglutide)  after your              Mounjaro (Tirzepatide)  procedure.              Rachelle Randall                It is important to monitor your blood sugar while doing the bowel preparation. On the day of your bowel   prep, when you are on a clear liquid diet, you may drink beverages with sugar as your source of glucose.   Be sure to mix the prep with water or sugar free liquid only. Below are instructions on how to adjust your   diabetic medications prior to your scheduled procedure. Call the healthcare provider who manages your   diabetes if you have questions.

## 2025-01-27 ENCOUNTER — TELEPHONE (OUTPATIENT)
Dept: ENDOSCOPY | Facility: HOSPITAL | Age: 75
End: 2025-01-27
Payer: MEDICARE

## 2025-01-27 DIAGNOSIS — Z12.11 COLON CANCER SCREENING: Primary | ICD-10-CM

## 2025-01-27 RX ORDER — POLYETHYLENE GLYCOL 3350, SODIUM SULFATE ANHYDROUS, SODIUM BICARBONATE, SODIUM CHLORIDE, POTASSIUM CHLORIDE 236; 22.74; 6.74; 5.86; 2.97 G/4L; G/4L; G/4L; G/4L; G/4L
4 POWDER, FOR SOLUTION ORAL ONCE
Qty: 4000 ML | Refills: 0 | Status: SHIPPED | OUTPATIENT
Start: 2025-01-27 | End: 2025-01-27

## 2025-01-27 NOTE — TELEPHONE ENCOUNTER
PEG (polyethylene glycol) Instructions for Colonoscopy   Common Brands: Golytely, Colyte, Nulytely, Gavilyte, Trilyte      Date of procedure: 2/3/25  Arrive at: 11:00 AM    Location of Department:   [facility] [address] [place] [address]   Stop in the hospital lobby on the 1st floor to get registered, then take the hospital elevators to the 2nd floor waiting room    As soon as possible:   your prep from pharmacy and over the counter DULCOLAX LAXATIVE TABLETS               What You CAN do:   You may have clear liquids ONLY -see below for list.     What You CANNOT do:   Do not EAT solid food, drink milk or anything colored red.  Do not drink alcohol.  Do not take oral medications within 1 hour of starting   each dose of prep.  No gum chewing or candy morning of procedure    Liquids That Are OK to Drink:   Water  Sports drinks (Gatorade, Power-Aid)  Coffee or tea (no cream or nondairy creamer)  Clear juices without pulp (apple, white grape)  Gelatin desserts (no fruit or toppings)  Clear soda (sprite, coke, ginger ale)  Chicken broth (until 12 midnight the night before procedure)                                Note:     (Please disregard the insert instructions from pharmacy).  PEG Bowel Prep is indicated for cleansing of the colon as a preparation for colonoscopy in adults.   Be sure to tell your doctor about all the medicines you take, including prescription and non-prescription medicines, vitamins, and herbal supplements. PEG Bowel Prep may affect how other medicines work.  Medication taken by mouth may not be absorbed properly when taken within 1 hour before the start of each dose of PEG Bowel Prep.      It is not uncommon to experience some abdominal cramping, nausea and/or vomiting when taking the prep. If you have nausea and/or vomiting while taking the prep, stop drinking for 20 to 30 minutes then continue.      How to take prep:    PEG Bowel Prep-Two (2) gallon containers.     Two (2) containers of prep are required for a complete preparation for colonoscopy. Dilute the solution concentrate as directed prior to use. You must drink water with each dose of prep, and additional water after each dose.    IMPORTANT: If you experience preparation-related symptoms (for example, nausea, bloating, or cramping), stop or slow the rate of drinking until your symptoms decrease.        DOSE 1--2 Days Before Colonoscopy 2/1/25     Drink at least 6 to 8 glasses of clear liquids from time you wake up until you begin your prep and then continue until bedtime to avoid dehydration.     You may have clear liquids ONLY.    Step 1-In the morning-Mix your entire container of prep with lukewarm water and refrigerate.      Step 2-12:00 pm (NOON) Take four (4) Dulcolax (Bisacodyl) tablets with at least 8 ounces or more of clear liquids.    Step 3-6:00 pm- Drink one 8 oz. glass of the prep every 10-15 minutes until the mixture is gone.   Set a timer as a reminder.        DOSE 2--Day Before Colonoscopy 2/2/25     Drink at least 6 to 8 glasses of clear liquids from time you wake up until you begin your prep and then continue until bedtime to avoid dehydration.     You may have clear liquids ONLY.    Step 1-In the morning- Mix your entire container of prep with lukewarm water and refrigerate.             Step 2-6:00 pm- Drink half the liquid in the container within one (1) hour. Refrigerate the remaining half of the prep for dose 3. See below when to begin this step.       DOSE 3--Day of the Colonoscopy 2/3/25  at 2-3 AM.      Step 1-Drink the remaining half of the prep within 1 hour.  Step 2- You may continue drinking water/clear liquids until        2  hours before your colonoscopy or as directed by the scheduling nurse 10:00 AM.      For more information about your procedure, please watch this informational video. It is important to watch this animated consent video prior to your arrival.   If you haven't  watched the video prior to arriving, you are required to watch it during admission which can cause delays.     Options for viewing:  Using a keyboard:  press and hold the control tab (Ctrl) and left mouse click to follow link                  For information about your procedure, two (2) things to view prior to colonoscopy:  Please watch this informational video. It is important to watch this animated consent video prior to your arrival. If you haven't watched the video prior to arriving, you are required to watch it during admission which can causes delays.    Options for viewing:   Using a keyboard:  press and hold the control tab (Ctrl) and left mouse click to follow links.           Colonoscopy Instructional Video                                                                                   OR    Type link address into your web browser's address bar:  [email or web address]      Educational Booklet with pictures:      Colonoscopy Prep - Liquid      Comments:                                             IMPORTANT INFORMATION TO KNOW BEFORE YOUR PROCEDURE    [facility] 2nd Floor         If your procedure requires the administration of anesthesia, it is necessary for a responsible adult to drive you home. (Medical Transportation, Uber, Lyft, Taxi, etc. may ONLY be used if a responsible adult is present to accompany you home.  The responsible adult CAN'T be the  of the service).      person must be available to return to pick you up within 15 minutes of being notified of discharge.       Please bring a picture ID, insurance card, & copayment      Take Medications as directed below:        If you begin taking any blood thinning medications, injectable weight loss/diabetes medications (other than insulin) , or Adipex (Phentermine) please contact the endoscopy scheduling department listed below as soon as possible.    If you are diabetic see the attached  instruction sheet regarding your medication.     If you take HEART, BLOOD PRESSURE, SEIZURE, PAIN, LUNG (including inhalers/nebulizers), ANTI-REJECTION (transplant patients), or PSYCHIATRIC medications, please take at your regular times with a sip of water or as directed by the scheduling nurse.     Important contact information:    Endoscopy [phone] Hours of operation Monday-Friday 8:00-4:30pm.    Questions about insurance or financial obligations call [phone] or [phone].    If you have questions regarding the prep or need to reschedule, please call [phone]. After hours questions requiring immediate assistance, contact [facility] nurse line at [phone] or [phone].   NOTE:     On occasion, unforeseen circumstances may cause a delay in your procedure start time. We respect your time and appreciate your patience during these circumstances.      Comments:     If you are unsure about any of these instructions, call [facility] at [phone].                          Oral Medicine  Day of Prep  Day of Procedure           Glyburide    Do NOT take morning of procedure. If you         Glucotrol (Glipizide)  Do NOT take. take twice daily, take with dinner.         Amaryl (Glimepiride)                                Glucophage    Do NOT take morning of procedure. Take         Glumetza  Take as  when you start eating again.          Fortamet (Metformin)  prescribed.                              Januvia (Sitaglipitin)    Do NOT take morning of procedure. Take         Nesina (Aloglipitin)    when you start eating again.          Onglyza (Saxaglipitin)  Take as              Tradjenta (Linaglipitin)  prescribed.                              Invokana (Canagliflozin)               Farxiga (Dapagliflozin)  Stop 2 days  Do NOT take morning of procedure. Take         Jardiance(Empagliflozin) before prep.  when you start eating again.                          Actos  (Pioglitazone)  Take as  Do NOT take morning of procedure. Take           prescribed.  when you start eating again.                          Injectable & Combination Daily Dose  Weekly Dose           Medicine                Adlyxin (Lixisenatide)  If you take these For weekly dose, hold dose at least 8 days prior        Byetta (Exenatide)  medications daily to appointment. You may take the dose after your        Bydureon (Exenatide XL) on the day of your procedure.            Ozempic (Semaglutide)  appointment hold              Trulicity (Dulaglutide)  that dose until              Victoza (Liraglutide)  after your              Mounjaro (Tirzepatide)  procedure.              Rachelle Randall                It is important to monitor your blood sugar while doing the bowel preparation. On the day of your bowel   prep, when you are on a clear liquid diet, you may drink beverages with sugar as your source of glucose.   Be sure to mix the prep with water or sugar free liquid only. Below are instructions on how to adjust your   diabetic medications prior to your scheduled procedure. Call the healthcare provider who manages your   diabetes if you have questions.   Insulin for Type 1   Diabetes Mellitus Day of Prep                                         Day of procedure          Basaglar If you use in the morning, take as prescribed.  If you take in the morning,         Lantus If you use in the evening, inject 70% of dose. inject 80% of dose. If you take        Levemir      in the evenings, inject usual         Toujeo      dose.           Dany Bunch Count carbs and adjust dose   Do NOT take morning of procedure.         Apidra accordingly. If not carb counting,  Take when you start eating again.         Humalog 100 take 25% of usual meal dose.             Humalog 200 May use correction dose every              Novolog 4 hours. Do NOT use once  sugar-free             liquid prep is started.                             Insulin Pump Count carbs and adjust your   May use temp basal rate at 70 % starting          dose as needed. May use temp basal at midnight until after procedure.          rate at 70 % after sugar-free clear liquid             prep is started until midnight.                              Insulin for Type 2   Diabetes Mellitus Day of Prep                                         Day of procedure          Basaglar If you use in the morning, take as prescribed.  If you take in the morning,         Lantus If you use in the evening, inject 70% of dose. inject 80% of dose. If you take        Levemir      in the evenings, inject usual         Toujeo      dose.           Tresiba                                                Afrezza Inject 50 % of dose with clear liquid diet.   Do NOT take morning of         Apidra Do NOT use once sugar-free clear liquid prep procedure. Take when you         Fiasp is started. If you are using a correction scale,  start eating meals again.         Humalog 100 take dose every 4 hours as needed.             Humalog 200                Novolog                                NPH  Inject 50% of breakfast and dinner   Do NOT take morning of         doses with clear liquid diet.    procedure. Take usual dose              when you eat dinner.                         Regular  Inject 50% of breakfast dose and do NOT take Do NOT take morning of          dinner dose once sugar-free liquid prep has   procedure. Take usual dose          started. If using correction scale, make take dose when you eat dinner.          every 6 hours as needed.                              Novolog Mix 70/30 Inject 50% of breakfast dose. Inject 25%  Do NOT take breakfast dose.         Humolog Mix 75/25 of dinner dose.     Take usual dose when you eat         Humolog Mix 50/50      dinner.                           U500 Take 50% of AM or breakfast unit alicia  dose.  Do NOT take mornining of           Take 25% of lunch or dinner or PM unit alicia dose.  procedure. Take when you               start eating again.                          V-Go  Continue to wear your V-Go device. Do NOT click  Coninue to wear your V-Go         once sugar-free clear liquid prep is started.   device. Resume clicks with               meals.

## 2025-01-31 ENCOUNTER — TELEPHONE (OUTPATIENT)
Dept: ENDOSCOPY | Facility: HOSPITAL | Age: 75
End: 2025-01-31
Payer: MEDICARE

## 2025-01-31 NOTE — TELEPHONE ENCOUNTER
Left message instructing patient to call dept @ [phone] between 8am-4pm.    Arrival time to be given @ 1100.  (Message sent via [facility] portal)

## 2025-02-04 RX ORDER — GLIMEPIRIDE 1 MG/1
1 TABLET ORAL
Qty: 90 TABLET | Refills: 0 | Status: SHIPPED | OUTPATIENT
Start: 2025-02-04

## 2025-02-04 NOTE — TELEPHONE ENCOUNTER
No care due was identified.  [facility] Embedded Care Due Messages. Reference number: [id number].   2/04/2025 5:34:40 PM CST

## 2025-02-05 NOTE — TELEPHONE ENCOUNTER
Refill Decision Note   Odilon Wilder  is requesting a refill authorization.  Brief Assessment and Rationale for Refill:  Approve     Medication Therapy Plan:         Comments:     Note composed:10:06 PM 02/04/2025

## 2025-03-20 ENCOUNTER — TELEPHONE (OUTPATIENT)
Dept: ENDOSCOPY | Facility: HOSPITAL | Age: 75
End: 2025-03-20
Payer: MEDICARE

## 2025-03-24 DIAGNOSIS — Z00.00 ENCOUNTER FOR MEDICARE ANNUAL WELLNESS EXAM: ICD-10-CM

## 2025-04-10 ENCOUNTER — TELEPHONE (OUTPATIENT)
Dept: GASTROENTEROLOGY | Facility: CLINIC | Age: 75
End: 2025-04-10
Payer: MEDICARE

## 2025-04-14 ENCOUNTER — OFFICE VISIT (OUTPATIENT)
Dept: FAMILY MEDICINE | Facility: CLINIC | Age: 75
End: 2025-04-14
Payer: MEDICARE

## 2025-04-14 VITALS
BODY MASS INDEX: 26.48 KG/M2 | WEIGHT: 184.94 LBS | SYSTOLIC BLOOD PRESSURE: 134 MMHG | TEMPERATURE: 98 F | HEART RATE: 94 BPM | HEIGHT: 70 IN | OXYGEN SATURATION: 95 % | DIASTOLIC BLOOD PRESSURE: 62 MMHG

## 2025-04-14 DIAGNOSIS — H61.20 IMPACTED CERUMEN, UNSPECIFIED LATERALITY: ICD-10-CM

## 2025-04-14 DIAGNOSIS — N18.31 STAGE 3A CHRONIC KIDNEY DISEASE: ICD-10-CM

## 2025-04-14 DIAGNOSIS — C61 PROSTATE CANCER: ICD-10-CM

## 2025-04-14 DIAGNOSIS — Z12.11 SCREENING FOR COLON CANCER: ICD-10-CM

## 2025-04-14 DIAGNOSIS — H65.90 FLUID COLLECTION OF MIDDLE EAR: Primary | ICD-10-CM

## 2025-04-14 PROCEDURE — G2211 COMPLEX E/M VISIT ADD ON: HCPCS | Mod: S$GLB,,, | Performed by: FAMILY MEDICINE

## 2025-04-14 PROCEDURE — 99214 OFFICE O/P EST MOD 30 MIN: CPT | Mod: S$GLB,,, | Performed by: FAMILY MEDICINE

## 2025-04-19 NOTE — PROGRESS NOTES
Patient ID: Odilon Wilder is a 75 y.o. male.    Chief Complaint: Tinnitus    HPI       Odilon Wilder is a 75 y.o. male    History of Present Illness    HPI:  Patient presents with constant tinnitus in both ears and ongoing shoulder pain. Patient reports constant tinnitus in both ears for approximately 2 months. The ringing is most severe upon waking, becoming less intense after about 15 minutes. Patient describes the sound as a light high-pitched tone. He notes decreased awareness of the tinnitus during activities such as yard work or garage tasks, but increased awareness during periods of rest. Patient cannot pinpoint the exact onset but confirms it was not present last Thanksgiving.    Patient also reports ongoing shoulder issues. A few months ago, he was unable to lie flat on his back for an MRI due to shoulder pain. He still sleeps on his left side with the affected arm propped up. Over the last couple of months, the shoulder has improved somewhat, allowing for increased range of motion. Patient uses Advil muscle rub, which provides relief, but occasionally has significant pain flares. He takes ibuprofen gel caps before bed when the pain is severe, which he finds effective.    Patient denies exposure to loud noises, explosions, or bangs that could have triggered the tinnitus. He denies nasal congestion or fullness.    MEDICATIONS:  Patient is on Ibuprofen gel caps, taking 2 capsules before bed for shoulder pain. He is also using Advil muscle rub, applied topically for the same issue. He has discontinued Zocor (simvastatin) for about 2 weeks due to muscle pain.    MEDICAL HISTORY:  Patient has a history of a recent sinus infection. He has had tinnitus with an onset about 2 months ago. He also has an ongoing shoulder issue that has persisted for at least a few months.    TEST RESULTS:  Patient's Hemoglobin A1c and blood sugar were checked in December, both showing good results.           Review of  "Symptoms        Physical Exam    Vitals:    04/14/25 1142   BP: 134/62   BP Location: Left arm   Patient Position: Sitting   Pulse: 94   Temp: 98.2 °F (36.8 °C)   TempSrc: Oral   SpO2: 95%   Weight: 83.9 kg (184 lb 15.5 oz)   Height: 5' 10" (1.778 m)        Constitutional:   Oriented to person, place, and time.appears well-developed and well-nourished.   No distress.     HENT:   Head: Normocephalic and atraumatic.     Right Ear: Tympanic membrane, external ear and ear canal normal     Left Ear: Tympanic membrane, external ear and ear canal normal    Nose: External Normal. Normal turbinates, No rhinorrhea     Mouth/Throat: Uvula is midline, oropharynx is clear and moist and mucous membranes are normal.     Neck: supple no anterior cervical adenopathy    Carotid artery:  Bruit    NEG []   POS[]    Eyes:   Conjunctivae are normal. Right eye exhibits no discharge. Left eye exhibits no discharge. No scleral icterus. No periorbital edema     Cardiovascular:  Regular rate and rhythm with normal S1 and S2     Pulmonary/Chest:   Clear to auscultation bilaterally without wheezes, rhonchi or rales    Musculoskeletal:  No edema. No obvious deformity No wasting     Abduction near 90 rt shoulder - must move slowly to obtiain    Neurological:   Alert and oriented to person, place, and time. Coordination normal.     Skin:   Skin is warm and dry.   No diaphoresis.   No rash noted.    Psychiatric: Normal mood and affect. Behavior is normal. Judgment and thought content normal.     Physical Exam    Ears: Cerumen impaction.         Assessment / Plan:      ICD-10-CM ICD-9-CM   1. Fluid collection of middle ear  H65.90 381.4   2. Screening for colon cancer  Z12.11 V76.51   3. Prostate cancer  C61 185   4. Stage 3a chronic kidney disease  N18.31 585.3   5. Impacted cerumen, unspecified laterality  H61.20 380.4     Fluid collection of middle ear    Screening for colon cancer  -     Ambulatory referral/consult to Endo Procedure ; " Future; Expected date: 04/15/2025    Prostate cancer    Stage 3a chronic kidney disease    Impacted cerumen, unspecified laterality        Assessment & Plan    - Suspect sudden onset of tinnitus due to eustachian tube dysfunction and fluid in the ears, given inability to pop ears.  - Recommend conservative management with OTC medications to thin mucus and reduce nasal inflammation before considering further interventions.  - Shoulder pain has improved range of motion.  - Home exercises would be as beneficial as formal physical therapy for current shoulder condition.  - Decided against ordering MRI Right Shoulder at this time, as findings unlikely to  plan.    STAGE 3A CHRONIC KIDNEY DISEASE:  - Patient's hemoglobin A1c and glucose levels were within acceptable range in December.  - Additional labs recommended to monitor kidney function.  - Blood work scheduled for the 20th of the month, with results to be reviewed at the next appointment.    SHOULDER DISORDER:  - Patient to perform daily shoulder exercises, including motions tested during the exam, using 5-pound weights and resistance bands for strengthening.         This note was generated with the assistance of ambient listening technology. Verbal consent was obtained by the patient and accompanying visitor(s) for the recording of patient appointment to facilitate this note. I attest to having reviewed and edited the generated note for accuracy, though some syntax or spelling errors may persist. Please contact the author of this note for any clarification.

## 2025-04-23 ENCOUNTER — TELEPHONE (OUTPATIENT)
Dept: GASTROENTEROLOGY | Facility: CLINIC | Age: 75
End: 2025-04-23
Payer: MEDICARE

## 2025-05-03 NOTE — TELEPHONE ENCOUNTER
Care Due:                  Date            Visit Type   Department     Provider  --------------------------------------------------------------------------------                                EP -                              PRIMARY      [facility]  Last Visit: 04-      CARE ([facility])   MEDICINE       Ryley Porter                               -                              PRIMARY      [facility]  Next Visit: 05-      CARE ([facility])   MEDICINE       Ryley Porter                                                            Last  Test          Frequency    Reason                     Performed    Due Date  --------------------------------------------------------------------------------    CMP.........  12 months..  atorvastatin,              05- 05-                             glimepiride, lisinopriL..    HBA1C.......  6 months...  glimepiride..............  12- 06-    Lipid Panel.  12 months..  atorvastatin.............  05- 05-    [facility] Embedded Care Due Messages. Reference number: [id number].   5/03/2025 9:54:51 AM CDT

## 2025-05-04 RX ORDER — GLIMEPIRIDE 1 MG/1
1 TABLET ORAL
Qty: 90 TABLET | Refills: 0 | Status: SHIPPED | OUTPATIENT
Start: 2025-05-04

## 2025-05-04 NOTE — TELEPHONE ENCOUNTER
Refill Decision Note   Odilon Wilder  is requesting a refill authorization.  Brief Assessment and Rationale for Refill:  Approve     Medication Therapy Plan:  FLOS      Comments:     Note composed:12:29 PM 05/04/2025

## 2025-05-20 ENCOUNTER — LAB VISIT (OUTPATIENT)
Dept: LAB | Facility: HOSPITAL | Age: 75
End: 2025-05-20
Attending: FAMILY MEDICINE
Payer: MEDICARE

## 2025-05-20 DIAGNOSIS — E78.5 TYPE 2 DIABETES MELLITUS WITH HYPERLIPIDEMIA: ICD-10-CM

## 2025-05-20 DIAGNOSIS — I15.2 HYPERTENSION ASSOCIATED WITH TYPE 2 DIABETES MELLITUS: ICD-10-CM

## 2025-05-20 DIAGNOSIS — E11.59 HYPERTENSION ASSOCIATED WITH TYPE 2 DIABETES MELLITUS: ICD-10-CM

## 2025-05-20 DIAGNOSIS — E11.69 TYPE 2 DIABETES MELLITUS WITH HYPERLIPIDEMIA: ICD-10-CM

## 2025-05-20 LAB
ABSOLUTE EOSINOPHIL (OHS): 0.23 K/UL
ABSOLUTE MONOCYTE (OHS): 0.46 K/UL (ref 0.3–1)
ABSOLUTE NEUTROPHIL COUNT (OHS): 3.24 K/UL (ref 1.8–7.7)
ALBUMIN SERPL BCP-MCNC: 4.3 G/DL (ref 3.5–5.2)
ALBUMIN/CREAT UR: 34.4 UG/MG
ALP SERPL-CCNC: 52 UNIT/L (ref 38–126)
ALT SERPL W/O P-5'-P-CCNC: 14 UNIT/L (ref 10–44)
ANION GAP (OHS): 6 MMOL/L (ref 8–16)
AST SERPL-CCNC: 19 UNIT/L (ref 15–46)
BASOPHILS # BLD AUTO: 0.02 K/UL
BASOPHILS NFR BLD AUTO: 0.4 %
BILIRUB SERPL-MCNC: 0.7 MG/DL (ref 0.1–1)
BUN SERPL-MCNC: 30 MG/DL (ref 2–20)
CALCIUM SERPL-MCNC: 10.3 MG/DL (ref 8.7–10.5)
CHLORIDE SERPL-SCNC: 100 MMOL/L (ref 95–110)
CHOLEST SERPL-MCNC: 106 MG/DL (ref 120–199)
CHOLEST/HDLC SERPL: 3.5 {RATIO} (ref 2–5)
CO2 SERPL-SCNC: 32 MMOL/L (ref 23–29)
CREAT SERPL-MCNC: 1.3 MG/DL (ref 0.5–1.4)
CREAT UR-MCNC: 247 MG/DL (ref 23–375)
EAG (OHS): 103 MG/DL (ref 68–131)
ERYTHROCYTE [DISTWIDTH] IN BLOOD BY AUTOMATED COUNT: 11.4 % (ref 11.5–14.5)
GFR SERPLBLD CREATININE-BSD FMLA CKD-EPI: 57 ML/MIN/1.73/M2
GLUCOSE SERPL-MCNC: 106 MG/DL (ref 70–110)
HBA1C MFR BLD: 5.2 % (ref 4–5.6)
HCT VFR BLD AUTO: 42.1 % (ref 40–54)
HDLC SERPL-MCNC: 30 MG/DL (ref 40–75)
HDLC SERPL: 28.3 % (ref 20–50)
HGB BLD-MCNC: 14.1 GM/DL (ref 14–18)
IMM GRANULOCYTES # BLD AUTO: 0.04 K/UL (ref 0–0.04)
IMM GRANULOCYTES NFR BLD AUTO: 0.8 % (ref 0–0.5)
LDLC SERPL CALC-MCNC: 55.2 MG/DL (ref 63–159)
LYMPHOCYTES # BLD AUTO: 1.12 K/UL (ref 1–4.8)
MCH RBC QN AUTO: 32.4 PG (ref 27–31)
MCHC RBC AUTO-ENTMCNC: 33.5 G/DL (ref 32–36)
MCV RBC AUTO: 97 FL (ref 82–98)
MICROALBUMIN UR-MCNC: 85 UG/ML (ref ?–5000)
NONHDLC SERPL-MCNC: 76 MG/DL
NUCLEATED RBC (/100WBC) (OHS): 0 /100 WBC
PLATELET # BLD AUTO: 196 K/UL (ref 150–450)
PMV BLD AUTO: 10.2 FL (ref 9.2–12.9)
POTASSIUM SERPL-SCNC: 4.6 MMOL/L (ref 3.5–5.1)
PROT SERPL-MCNC: 7.1 GM/DL (ref 6–8.4)
RBC # BLD AUTO: 4.35 M/UL (ref 4.6–6.2)
RELATIVE EOSINOPHIL (OHS): 4.5 %
RELATIVE LYMPHOCYTE (OHS): 21.9 % (ref 18–48)
RELATIVE MONOCYTE (OHS): 9 % (ref 4–15)
RELATIVE NEUTROPHIL (OHS): 63.4 % (ref 38–73)
SODIUM SERPL-SCNC: 138 MMOL/L (ref 136–145)
TRIGL SERPL-MCNC: 104 MG/DL (ref 30–150)
TSH SERPL-ACNC: 0.76 UIU/ML (ref 0.4–4)
WBC # BLD AUTO: 5.11 K/UL (ref 3.9–12.7)

## 2025-05-20 PROCEDURE — 84443 ASSAY THYROID STIM HORMONE: CPT | Mod: PN

## 2025-05-20 PROCEDURE — 83036 HEMOGLOBIN GLYCOSYLATED A1C: CPT | Mod: PN

## 2025-05-20 PROCEDURE — 85025 COMPLETE CBC W/AUTO DIFF WBC: CPT | Mod: PN

## 2025-05-20 PROCEDURE — 36415 COLL VENOUS BLD VENIPUNCTURE: CPT | Mod: PN

## 2025-05-20 PROCEDURE — 82043 UR ALBUMIN QUANTITATIVE: CPT | Mod: PN

## 2025-05-20 PROCEDURE — 80053 COMPREHEN METABOLIC PANEL: CPT | Mod: PN

## 2025-05-20 PROCEDURE — 82465 ASSAY BLD/SERUM CHOLESTEROL: CPT | Mod: PN

## 2025-05-22 ENCOUNTER — RESULTS FOLLOW-UP (OUTPATIENT)
Dept: FAMILY MEDICINE | Facility: CLINIC | Age: 75
End: 2025-05-22

## 2025-05-23 DIAGNOSIS — N13.8 BPH WITH URINARY OBSTRUCTION: ICD-10-CM

## 2025-05-23 DIAGNOSIS — R33.9 URINARY RETENTION: ICD-10-CM

## 2025-05-23 DIAGNOSIS — N40.1 BPH WITH URINARY OBSTRUCTION: ICD-10-CM

## 2025-05-23 DIAGNOSIS — Z46.6 ENCOUNTER FOR FOLEY CATHETER REMOVAL: ICD-10-CM

## 2025-05-23 RX ORDER — TAMSULOSIN HYDROCHLORIDE 0.4 MG/1
CAPSULE ORAL
Qty: 90 CAPSULE | Refills: 3 | Status: SHIPPED | OUTPATIENT
Start: 2025-05-23

## 2025-05-23 NOTE — TELEPHONE ENCOUNTER
No care due was identified.  [facility] Embedded Care Due Messages. Reference number: [id number].   5/23/2025 7:03:01 AM CDT

## 2025-05-23 NOTE — TELEPHONE ENCOUNTER
Refill Routing Note   Medication(s) are not appropriate for processing by [facility] for the following reason(s):        Outside of protocol  Drug-disease interaction    [facility] action(s):  Route        Medication Therapy Plan: prostate cancer on problem list      Appointments  past 12m or future 3m with PCP    Date Provider   Last Visit   4/14/2025 Ryley Porter MD   Next Visit   5/27/2025 Ryley Porter MD   ED visits in past 90 days: 0        Note composed:9:05 AM 05/23/2025

## 2025-05-27 ENCOUNTER — OFFICE VISIT (OUTPATIENT)
Dept: FAMILY MEDICINE | Facility: CLINIC | Age: 75
End: 2025-05-27
Payer: MEDICARE

## 2025-05-27 VITALS
OXYGEN SATURATION: 95 % | BODY MASS INDEX: 26.05 KG/M2 | HEART RATE: 86 BPM | SYSTOLIC BLOOD PRESSURE: 112 MMHG | TEMPERATURE: 98 F | HEIGHT: 70 IN | WEIGHT: 182 LBS | DIASTOLIC BLOOD PRESSURE: 62 MMHG

## 2025-05-27 DIAGNOSIS — Z12.11 SCREENING FOR COLON CANCER: ICD-10-CM

## 2025-05-27 DIAGNOSIS — Z00.00 ROUTINE HEALTH MAINTENANCE: Primary | ICD-10-CM

## 2025-05-27 DIAGNOSIS — E78.5 TYPE 2 DIABETES MELLITUS WITH HYPERLIPIDEMIA: ICD-10-CM

## 2025-05-27 DIAGNOSIS — E11.69 TYPE 2 DIABETES MELLITUS WITH HYPERLIPIDEMIA: ICD-10-CM

## 2025-05-27 DIAGNOSIS — E11.3313 TYPE 2 DIABETES MELLITUS WITH BOTH EYES AFFECTED BY MODERATE NONPROLIFERATIVE RETINOPATHY AND MACULAR EDEMA, WITHOUT LONG-TERM CURRENT USE OF INSULIN: ICD-10-CM

## 2025-05-27 DIAGNOSIS — H93.19 TINNITUS, UNSPECIFIED LATERALITY: ICD-10-CM

## 2025-05-27 PROCEDURE — 99397 PER PM REEVAL EST PAT 65+ YR: CPT | Mod: S$GLB,,, | Performed by: FAMILY MEDICINE

## 2025-05-27 PROCEDURE — 3078F DIAST BP <80 MM HG: CPT | Mod: CPTII,S$GLB,, | Performed by: FAMILY MEDICINE

## 2025-05-27 PROCEDURE — 4010F ACE/ARB THERAPY RXD/TAKEN: CPT | Mod: CPTII,S$GLB,, | Performed by: FAMILY MEDICINE

## 2025-05-27 PROCEDURE — 3074F SYST BP LT 130 MM HG: CPT | Mod: CPTII,S$GLB,, | Performed by: FAMILY MEDICINE

## 2025-05-27 PROCEDURE — 1159F MED LIST DOCD IN RCRD: CPT | Mod: CPTII,S$GLB,, | Performed by: FAMILY MEDICINE

## 2025-05-27 PROCEDURE — 1101F PT FALLS ASSESS-DOCD LE1/YR: CPT | Mod: CPTII,S$GLB,, | Performed by: FAMILY MEDICINE

## 2025-05-27 PROCEDURE — 3288F FALL RISK ASSESSMENT DOCD: CPT | Mod: CPTII,S$GLB,, | Performed by: FAMILY MEDICINE

## 2025-05-27 PROCEDURE — 3060F POS MICROALBUMINURIA REV: CPT | Mod: CPTII,S$GLB,, | Performed by: FAMILY MEDICINE

## 2025-05-27 PROCEDURE — 3044F HG A1C LEVEL LT 7.0%: CPT | Mod: CPTII,S$GLB,, | Performed by: FAMILY MEDICINE

## 2025-05-27 PROCEDURE — 3066F NEPHROPATHY DOC TX: CPT | Mod: CPTII,S$GLB,, | Performed by: FAMILY MEDICINE

## 2025-05-27 PROCEDURE — 1126F AMNT PAIN NOTED NONE PRSNT: CPT | Mod: CPTII,S$GLB,, | Performed by: FAMILY MEDICINE

## 2025-05-27 PROCEDURE — 1160F RVW MEDS BY RX/DR IN RCRD: CPT | Mod: CPTII,S$GLB,, | Performed by: FAMILY MEDICINE

## 2025-05-27 RX ORDER — GLIMEPIRIDE 1 MG/1
1 TABLET ORAL
Qty: 90 TABLET | Refills: 0 | Status: SHIPPED | OUTPATIENT
Start: 2025-05-27

## 2025-05-30 ENCOUNTER — CLINICAL SUPPORT (OUTPATIENT)
Dept: ENDOSCOPY | Facility: HOSPITAL | Age: 75
End: 2025-05-30
Attending: FAMILY MEDICINE
Payer: MEDICARE

## 2025-05-30 ENCOUNTER — TELEPHONE (OUTPATIENT)
Dept: ENDOSCOPY | Facility: HOSPITAL | Age: 75
End: 2025-05-30

## 2025-05-30 DIAGNOSIS — Z12.11 SCREENING FOR COLON CANCER: ICD-10-CM

## 2025-05-30 RX ORDER — ATORVASTATIN CALCIUM 10 MG/1
10 TABLET, FILM COATED ORAL EVERY OTHER DAY
Qty: 45 TABLET | Refills: 3 | Status: SHIPPED | OUTPATIENT
Start: 2025-05-30

## 2025-05-30 NOTE — TELEPHONE ENCOUNTER
Patient is scheduled for a Colonoscopy on 07/03/25 with Dr. HILLARY Alcazar  Referral for procedure from PAT appointment

## 2025-05-30 NOTE — TELEPHONE ENCOUNTER
No care due was identified.  [facility] Embedded Care Due Messages. Reference number: [id number].   5/30/2025 6:45:12 AM CDT

## 2025-05-30 NOTE — TELEPHONE ENCOUNTER
Refill Decision Note   Odilon Wilder  is requesting a refill authorization.  Brief Assessment and Rationale for Refill:  Approve     Medication Therapy Plan:         Comments:     Note composed:11:57 AM 05/30/2025

## 2025-07-01 ENCOUNTER — TELEPHONE (OUTPATIENT)
Dept: ENDOSCOPY | Facility: HOSPITAL | Age: 75
End: 2025-07-01
Payer: MEDICARE

## 2025-07-01 NOTE — TELEPHONE ENCOUNTER
Left message instructing patient to call dept @ [phone] between 8am-4pm.    Arrival time to be given @ 0700/Colonoscopy  (Inst sent via [facility] portal)

## 2025-07-02 ENCOUNTER — TELEPHONE (OUTPATIENT)
Dept: ENDOSCOPY | Facility: HOSPITAL | Age: 75
End: 2025-07-02
Payer: MEDICARE

## 2025-07-02 NOTE — TELEPHONE ENCOUNTER
Left message instructing patient to call dept @ [phone] between 8am-4pm.    Arrival time to be given @ *110  (Message sent via [facility] portal)